# Patient Record
Sex: FEMALE | Race: WHITE | NOT HISPANIC OR LATINO | Employment: OTHER | URBAN - METROPOLITAN AREA
[De-identification: names, ages, dates, MRNs, and addresses within clinical notes are randomized per-mention and may not be internally consistent; named-entity substitution may affect disease eponyms.]

---

## 2020-09-24 ENCOUNTER — TELEPHONE (OUTPATIENT)
Dept: TRANSPLANT | Facility: CLINIC | Age: 60
End: 2020-09-24

## 2020-09-24 NOTE — LETTER
September 24, 2020    Denis Rai  [address] [facility] [place] [address]  Phone: [phone]  Fax: [phone]             Dear Denis Rai:    Patient: Antonella Chase   MR Number: [id number]   YOB: 1960     Thank you for the referral of Antonella Chase to our lung transplant program. Your patients demographic and   clinical information have been submitted for transplant provider review and financial clearance. Once the provider and insurance company has approved the consult for your patient, she will be contacted by our office re: the date for an appointment. We will update you once this initial appointment has been scheduled. You will receive an after-visit summary following the completion of your patients appointment in our clinic.    Thank you again for your trust in our program. If there is anything we can do for you or your staff, please feel free to contact us at [phone].    Sincerely,         Amari Hill MD   Director, Lung Transplantation   Pulmonary & Critical Care Medicine    [facility]  [address] [place] [id number]  [phone]

## 2020-09-24 NOTE — TELEPHONE ENCOUNTER
Received referral from Dr. Rai, routed to provider for review, authorization to proceed with financial clearance/ lung transplant consult scheduling.

## 2020-09-24 NOTE — TELEPHONE ENCOUNTER
----- Message from Zora Garber sent at 9/24/2020  4:54 PM CDT -----  Regarding: Lung transpont appt  Dr FRANCO Sow it refing this pt for lung transplant.     Can you plz ctc the to schedule and appt. ?    The refrl and records are in media.     I'll check chart for updates and epic for appt.     Thank you,  Zora Garber   [facility]    [id number]

## 2020-09-25 ENCOUNTER — TELEPHONE (OUTPATIENT)
Dept: TRANSPLANT | Facility: CLINIC | Age: 60
End: 2020-09-25

## 2020-09-25 DIAGNOSIS — J84.112 IPF (IDIOPATHIC PULMONARY FIBROSIS): ICD-10-CM

## 2020-09-25 DIAGNOSIS — Z01.812 PRE-PROCEDURE LAB EXAM: Primary | ICD-10-CM

## 2020-09-25 DIAGNOSIS — J44.9 CHRONIC OBSTRUCTIVE PULMONARY DISEASE, UNSPECIFIED COPD TYPE: ICD-10-CM

## 2020-09-25 NOTE — LETTER
October 2, 2020    Denis Rai  [address] [facility] [place] [address]  Phone: [phone]  Fax: [phone]             Dear Denis Rai:    Patient: Antonella Chase   MR Number: [id number]   YOB: 1960     Thank you for the referral of Antonella Chase to our lung transplant program. An initial appointment with the transplant team has been scheduled for October 20, 2020. You will receive an after-visit summary following the completion of your patients appointment in our clinic.    Thank you again for your trust in our program. If there is anything we can do for you or your staff, please feel free to contact us at [phone].    Sincerely,         Amari Hill MD   Director, Lung Transplantation   Pulmonary & Critical Care Medicine    [facility]  [address] [place] [address]  [phone]

## 2020-09-25 NOTE — TELEPHONE ENCOUNTER
----- Message from Syeda Arango DO sent at 9/25/2020  2:50 PM CDT -----  Regarding: RE: New Referral  Yes please  ----- Message -----  From: Jelena Hernandez  Sent: 9/25/2020   1:38 PM CDT  To: Syeda Arango DO  Subject: New Referral                                     Referral from Dr. Denis Rai    Dx: IPF/COPD  Patient is 60 year old female.  BMI 23.17    PFT's 5/4/20  FVC 2.50  FEV1 1.32  TLC 5.41  DLCO 9.87    Consult?

## 2020-10-02 ENCOUNTER — TELEPHONE (OUTPATIENT)
Dept: TRANSPLANT | Facility: CLINIC | Age: 60
End: 2020-10-02

## 2020-10-02 NOTE — TELEPHONE ENCOUNTER
Left voicemail for patient about upcoming appointments on 10-20-20@8am,COVID test [place], on 10/17/20@10am.

## 2020-10-02 NOTE — TELEPHONE ENCOUNTER
Attempted to reach patient and her daughter regarding lung transplant referral. No answer, left voicemail requesting return call.

## 2020-10-02 NOTE — TELEPHONE ENCOUNTER
Patient returned call about lung transplant referral, consult scheduling. Patient is interested and requests next available date, in the morning. Offered October 20. Patient requested this date. Patient is aware we will confirm appointment date and time, she will need to obtain PFTs. Patient is aware that she will need to bring CD of CT scan for her consult visit. She reports that she is having testing today: 6MWT, echo, CT locally. She is aware she will need to have COVID testing prior to PFTs at [facility]. She consents to testing at [facility] three days prior to appointment. Patient verbalized understanding, she is aware will need to wear a mask at all times, bring oxygen for travel, and states her daughter will accompany her. All questions answered at this time. Request to .

## 2020-10-06 ENCOUNTER — TELEPHONE (OUTPATIENT)
Dept: TRANSPLANT | Facility: CLINIC | Age: 60
End: 2020-10-06

## 2020-10-06 NOTE — TELEPHONE ENCOUNTER
Patient called to follow up regarding upcoming consult for appointment times and patient my chart access. Resent email. Patient is aware instructions will be mailed as well. All questions answered at this time.

## 2020-10-06 NOTE — TELEPHONE ENCOUNTER
----- Message from Shelby Moraes sent at 10/6/2020 12:45 PM CDT -----  Contact: pt  Patient calling regarding 10/17/2020 appt  Speak with nurse       Call Back : [phone]

## 2020-10-16 ENCOUNTER — TELEPHONE (OUTPATIENT)
Dept: TRANSPLANT | Facility: CLINIC | Age: 60
End: 2020-10-16

## 2020-10-16 NOTE — TELEPHONE ENCOUNTER
Left voicemail for patient about upcoming appointments on 10/20/20.  And COVID test [place] on 10/17/20.

## 2020-10-17 ENCOUNTER — LAB VISIT (OUTPATIENT)
Dept: FAMILY MEDICINE | Facility: CLINIC | Age: 60
End: 2020-10-17
Payer: COMMERCIAL

## 2020-10-17 DIAGNOSIS — Z01.812 PRE-PROCEDURE LAB EXAM: ICD-10-CM

## 2020-10-17 PROCEDURE — U0003 INFECTIOUS AGENT DETECTION BY NUCLEIC ACID (DNA OR RNA); SEVERE ACUTE RESPIRATORY SYNDROME CORONAVIRUS 2 (SARS-COV-2) (CORONAVIRUS DISEASE [COVID-19]), AMPLIFIED PROBE TECHNIQUE, MAKING USE OF HIGH THROUGHPUT TECHNOLOGIES AS DESCRIBED BY CMS-2020-01-R: HCPCS | Mod: TXP

## 2020-10-18 LAB — SARS-COV-2 RNA RESP QL NAA+PROBE: NOT DETECTED

## 2020-10-20 ENCOUNTER — HOSPITAL ENCOUNTER (OUTPATIENT)
Dept: PULMONOLOGY | Facility: CLINIC | Age: 60
Discharge: HOME | End: 2020-10-20
Payer: COMMERCIAL

## 2020-10-20 ENCOUNTER — INITIAL CONSULT (OUTPATIENT)
Dept: TRANSPLANT | Facility: CLINIC | Age: 60
End: 2020-10-20
Payer: COMMERCIAL

## 2020-10-20 VITALS
DIASTOLIC BLOOD PRESSURE: 79 MMHG | TEMPERATURE: 97 F | SYSTOLIC BLOOD PRESSURE: 187 MMHG | BODY MASS INDEX: 26.22 KG/M2 | HEIGHT: 63 IN | RESPIRATION RATE: 20 BRPM | HEART RATE: 70 BPM | OXYGEN SATURATION: 95 % | WEIGHT: 148 LBS

## 2020-10-20 DIAGNOSIS — Z76.82 LUNG TRANSPLANT CANDIDATE: ICD-10-CM

## 2020-10-20 DIAGNOSIS — C44.90 SKIN CANCER: ICD-10-CM

## 2020-10-20 DIAGNOSIS — J84.112 IPF (IDIOPATHIC PULMONARY FIBROSIS): ICD-10-CM

## 2020-10-20 DIAGNOSIS — J44.9 STAGE 3 SEVERE COPD BY GOLD CLASSIFICATION: ICD-10-CM

## 2020-10-20 DIAGNOSIS — J44.9 CHRONIC OBSTRUCTIVE PULMONARY DISEASE, UNSPECIFIED COPD TYPE: ICD-10-CM

## 2020-10-20 DIAGNOSIS — I50.20 HFREF (HEART FAILURE WITH REDUCED EJECTION FRACTION): Primary | ICD-10-CM

## 2020-10-20 LAB
AMPHET+METHAMPHET UR QL: NEGATIVE
BARBITURATES UR QL SCN>200 NG/ML: NEGATIVE
BENZODIAZ UR QL SCN>200 NG/ML: NEGATIVE
BZE UR QL SCN: NEGATIVE
CANNABINOIDS UR QL SCN: NEGATIVE
CREAT UR-MCNC: 29 MG/DL (ref 15–325)
ETHANOL UR-MCNC: <10 MG/DL
METHADONE UR QL SCN>300 NG/ML: NEGATIVE
OPIATES UR QL SCN: NEGATIVE
PCP UR QL SCN>25 NG/ML: NEGATIVE
TOXICOLOGY INFORMATION: NORMAL

## 2020-10-20 PROCEDURE — 99204 PR OFFICE/OUTPT VISIT, NEW, LEVL IV, 45-59 MIN: ICD-10-PCS | Mod: 25,S$GLB,TXP, | Performed by: INTERNAL MEDICINE

## 2020-10-20 PROCEDURE — 94729 DIFFUSING CAPACITY: CPT | Mod: S$GLB,TXP,, | Performed by: INTERNAL MEDICINE

## 2020-10-20 PROCEDURE — 94729 PR C02/MEMBANE DIFFUSE CAPACITY: ICD-10-PCS | Mod: S$GLB,TXP,, | Performed by: INTERNAL MEDICINE

## 2020-10-20 PROCEDURE — 3008F PR BODY MASS INDEX (BMI) DOCUMENTED: ICD-10-PCS | Mod: CPTII,S$GLB,TXP, | Performed by: INTERNAL MEDICINE

## 2020-10-20 PROCEDURE — 99999 PR PBB SHADOW E&M-EST. PATIENT-LVL III: CPT | Mod: PBBFAC,TXP,, | Performed by: INTERNAL MEDICINE

## 2020-10-20 PROCEDURE — 94010 BREATHING CAPACITY TEST: ICD-10-PCS | Mod: S$GLB,TXP,, | Performed by: INTERNAL MEDICINE

## 2020-10-20 PROCEDURE — 3008F BODY MASS INDEX DOCD: CPT | Mod: CPTII,S$GLB,TXP, | Performed by: INTERNAL MEDICINE

## 2020-10-20 PROCEDURE — 94727 PR PULM FUNCTION TEST BY GAS: ICD-10-PCS | Mod: S$GLB,TXP,, | Performed by: INTERNAL MEDICINE

## 2020-10-20 PROCEDURE — 80307 DRUG TEST PRSMV CHEM ANLYZR: CPT | Mod: NTX

## 2020-10-20 PROCEDURE — 80323 ALKALOIDS NOS: CPT | Mod: NTX

## 2020-10-20 PROCEDURE — 99999 PR PBB SHADOW E&M-EST. PATIENT-LVL III: ICD-10-PCS | Mod: PBBFAC,TXP,, | Performed by: INTERNAL MEDICINE

## 2020-10-20 PROCEDURE — 94010 BREATHING CAPACITY TEST: CPT | Mod: S$GLB,TXP,, | Performed by: INTERNAL MEDICINE

## 2020-10-20 PROCEDURE — 94727 GAS DIL/WSHOT DETER LNG VOL: CPT | Mod: S$GLB,TXP,, | Performed by: INTERNAL MEDICINE

## 2020-10-20 PROCEDURE — 99204 OFFICE O/P NEW MOD 45 MIN: CPT | Mod: 25,S$GLB,TXP, | Performed by: INTERNAL MEDICINE

## 2020-10-20 RX ORDER — ALBUTEROL SULFATE 1.25 MG/3ML
3 SOLUTION RESPIRATORY (INHALATION) EVERY 6 HOURS PRN
COMMUNITY
Start: 2020-07-22

## 2020-10-20 RX ORDER — LEVOCETIRIZINE DIHYDROCHLORIDE 5 MG/1
5 TABLET, FILM COATED ORAL NIGHTLY PRN
COMMUNITY
Start: 2020-07-14 | End: 2023-01-03

## 2020-10-20 RX ORDER — PSEUDOEPHEDRINE HCL 30 MG/1
1-2 TABLET, FILM COATED ORAL DAILY PRN
COMMUNITY
Start: 2020-09-01 | End: 2023-01-03

## 2020-10-20 RX ORDER — LINACLOTIDE 72 UG/1
72 CAPSULE, GELATIN COATED ORAL DAILY PRN
COMMUNITY
Start: 2020-09-09

## 2020-10-20 RX ORDER — ALBUTEROL SULFATE 90 UG/1
2 AEROSOL, METERED RESPIRATORY (INHALATION) EVERY 4 HOURS PRN
COMMUNITY
Start: 2020-10-16

## 2020-10-20 RX ORDER — FLUTICASONE FUROATE, UMECLIDINIUM BROMIDE AND VILANTEROL TRIFENATATE 100; 62.5; 25 UG/1; UG/1; UG/1
1 POWDER RESPIRATORY (INHALATION) DAILY
COMMUNITY
Start: 2020-09-30 | End: 2021-11-18

## 2020-10-20 RX ORDER — ESOMEPRAZOLE MAGNESIUM 40 MG/1
1 CAPSULE, DELAYED RELEASE ORAL DAILY
COMMUNITY
Start: 2020-10-06 | End: 2023-01-03

## 2020-10-20 RX ORDER — IBUPROFEN 600 MG/1
600 TABLET ORAL 4 TIMES DAILY PRN
COMMUNITY
Start: 2020-09-25 | End: 2023-01-03

## 2020-10-20 RX ORDER — HYDROXYZINE HYDROCHLORIDE 25 MG/1
25 TABLET, FILM COATED ORAL NIGHTLY PRN
COMMUNITY
Start: 2020-07-28

## 2020-10-20 NOTE — LETTER
October 20, 2020        Denis Rai  [address] [facility] [place] [address]  Phone: [phone]  Fax: [phone]             Sunil Kam - Transplant [facility]  [address]  [facility] [address]  Phone: [phone]   Patient: Antonella Chase   MR Number: [id number]   YOB: 1960   Date of Visit: 10/20/2020       Dear Dr. Denis Rai    Thank you for referring Antonella Chase to me for evaluation. Attached you will find relevant portions of my assessment and plan of care.    If you have questions, please do not hesitate to call me. I look forward to following Antonella Chase along with you.    Sincerely,    Digna Nguyen MD    Enclosure    If you would like to receive this communication electronically, please contact [email or web address] or [phone] to request [employer] Link access.    [employer] is a tool which provides read-only access to select patient information with whom you have a relationship. Its easy to use and provides real time access to review your patients record including encounter summaries, notes, results, and demographic information.    If you feel you have received this communication in error or would no longer like to receive these types of communications, please e-mail [email or web address]

## 2020-10-20 NOTE — PROGRESS NOTES
LUNG TRANSPLANT INITIAL EVALUATION                                                                                                                                           Reason for Visit:  Evaluation for lung transplant    Referring Physician: Digna Nguyen MD    History of Present Illness: Antonella Chase is a 60 y.o. female who is on 0L of oxygen.  She is on no assisted ventilation.  Her New York Heart Association Class is III and a Karnofsky score of 70% - Cares for self: Unable to carry on normal activity or active work. She is not diabetic.     Patient with hx/o combined pulmonary fibrosis and emphysema, previous pneumothoraces due to bullous disease, tobacco use, cutaneous malignancy, GERD/PUD, presenting for initial visit to pre-LT clinic for evaluation of possible lung transplantation accompanied by daughter, Dennis Glasgow.     She was diagnosed with COPD due to emphysema about 10 years ago and, currently, on Trelegy and prn albuterol with good control of her symptoms.  She was diagnosed with ILD in 07/2020 based on radiographs.  She is not on any therapy yet due to concerns with her underlying GI disease.  She reports that her dyspnea got progressively worse in the last 7 months.  A year ago, she reports being fairly more active.  She denies any hospitalization related to respiratory illness in more than 5 years.  She, however, has had multiple at home treatment for acute exacerbations with antibiotics and steroids.  Last acute exacerbation about a week ago.  The only intubations, were grover-op for her lung surgeries.       Other pertinent hx include: hx/o bilateral pneumothoraces, GI disease, cutaneous malignancies, and substance abuse.        Pneumothoraces: She reports a hx/o left pneumothorax at age 31 x 3 then followed by right pneumothorax s/p likely bilateral bullectomy     GI disease: PUD diagnosed per EGD, on Nexium.  She has history of diverticulosis with multiple bouts of diverticulitis s/p  resection.        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  She has been sober for > 6years.  Previously, addicted to BZD and cocaine.   She has had 2 surgeries since she was sober (tummy tuck and hysterectomy) and she did not have a relapse    She reports 6 pregnancies, previous hx/o blood transfusions.   She denies any other cardiac/thoracic surgeries, chest trauma, hx/o clotting or bleeding disorders, hx/o arthritis, connective tissue disease, raynaud's phenomenon.        Age/gender appropriate screening/immunizations:  -Up-to-date with colonoscopy, mammogram  -Gyn- not indicated, complete hysterectomy 06/2020 for uterine fibroid(benign).    -Up-to-date with age/disease appropriate vaccinations, including pneumonia, influenza.      No prior/ongoing evaluation for LT at other centers    Patient is a resident of [place] but is motivated to relocate to [place] for the required time-frame during the lung transplantation and post-lung transplant period.    Past Medical History:   Diagnosis Date    Anal fissure     Ureña's esophagus     COPD (chronic obstructive pulmonary disease)     Diverticulitis     Hiatal hernia     IPF (idiopathic pulmonary fibrosis)        Past Surgical History:   Procedure Laterality Date    COLECTOMY  2012    COLONOSCOPY  10/2019    most recent, has every three years    DILATION AND CURETTAGE OF UTERUS      x3 (miscarriages)    EXCISION OF MELANOMA  2013    on back    HYSTERECTOMY  06/01/2020    SQUAMOUS CELL CARCINOMA EXCISION  2020    lip    THORACOTOMY Left 1991    THORACOTOMY Right 1993    spontaneous pneumo       Allergies: Codeine and Morphine    Current Outpatient Medications   Medication Sig    albuterol (ACCUNEB) 1.25 mg/3 mL Nebu Take 3 mLs by  nebulization every 6 (six) hours as needed.    albuterol (PROVENTIL/VENTOLIN HFA) 90 mcg/actuation inhaler Inhale 2 puffs into the lungs every 4 (four) hours as needed.    esomeprazole (NEXIUM) 40 MG capsule Take 1 capsule by mouth once daily.    hydrOXYzine HCL (ATARAX) 25 MG tablet Take 25 mg by mouth nightly as needed. For hives    ibuprofen (ADVIL,MOTRIN) 600 MG tablet Take 600 mg by mouth 4 (four) times daily as needed.    levocetirizine (XYZAL) 5 MG tablet Take 5 mg by mouth nightly as needed for Allergies. For hives    LINZESS 72 mcg Cap capsule Take 72 mcg by mouth daily as needed.    SUDOGEST 30 mg tablet Take 1-2 tablets by mouth daily as needed.    TRELEGY ELLIPTA 100-62.5-25 mcg DsDv Inhale 1 puff into the lungs once daily.     No current facility-administered medications for this visit.          There is no immunization history on file for this patient.  Family History:    Family History   Problem Relation Age of Onset    COPD Mother          at 72    Heart disease Mother     Other Mother         addiction history    Alcohol abuse Father         65    Kidney disease Father         ESRD    Breast cancer Sister         2016    No Known Problems Brother     Alzheimer's disease Brother         diagnosed in the last year    No Known Problems Brother     Bipolar disorder Sister     Coronary artery disease Sister         stented 2 months ago    Wagg-Ubstu-Wcgvlay disease Sister 6        hip replacement x 2, 'every ten years hip replacement'    No Known Problems Daughter     Other Daughter         spontaneous pneumo x 3 at 13    Hashimoto's thyroiditis Daughter     Other Daughter         alcohol abuse, 9 months sober 2020    Hypertension Daughter     Pneumonia Daughter         2017 influenza; intubated 14 days     Social History     Substance and Sexual Activity   Alcohol Use Not Currently    Frequency: Monthly or less    Binge frequency: Less than monthly    Comment:  "history of, last 2016, would drink about a bottle of wine a day      Social History     Substance and Sexual Activity   Drug Use Not Currently    Types: Benzodiazepines, Cocaine, "Crack" cocaine    Comment: valium 'her mother used to give her it for cramps and travel'; detox 2016; rehab and currently a sponsor, active in AA      Social History     Socioeconomic History    Marital status: Single     Spouse name: Not on file    Number of children: Not on file    Years of education: Not on file    Highest education level: Not on file   Occupational History    Not on file   Social Needs    Financial resource strain: Not very hard    Food insecurity     Worry: Never true     Inability: Never true    Transportation needs     Medical: No     Non-medical: No   Tobacco Use    Smoking status: Former Smoker     Types: Cigarettes     Start date: 1976     Quit date: 10/5/2020     Years since quittin.0    Smokeless tobacco: Never Used    Tobacco comment: still wears a patch, last used  21g patch   Substance and Sexual Activity    Alcohol use: Not Currently     Frequency: Monthly or less     Binge frequency: Less than monthly     Comment: history of, last , would drink about a bottle of wine a day    Drug use: Not Currently     Types: Benzodiazepines, Cocaine, "Crack" cocaine     Comment: valium 'her mother used to give her it for cramps and travel'; detox 2016; rehab and currently a sponsor, active in AA    Sexual activity: Not Currently     Partners: Male   Lifestyle    Physical activity     Days per week: 0 days     Minutes per session: 0 min    Stress: Only a little   Relationships    Social connections     Talks on phone: More than three times a week     Gets together: More than three times a week     Attends [employer]ism service: Not on file     Active member of club or organization: Yes     Attends meetings of clubs or organizations: 1 to 4 times per year     Relationship status:  " "  Other Topics Concern    Not on file   Social History Narrative    Not on file     Review of Systems   Constitutional: Negative for chills, fever, malaise/fatigue and weight loss.   HENT: Negative for congestion, ear pain, hearing loss and sore throat.    Eyes: Negative for blurred vision and double vision.   Respiratory: Positive for shortness of breath. Negative for cough, hemoptysis, sputum production and wheezing.    Cardiovascular: Negative for chest pain, palpitations and leg swelling.   Gastrointestinal: Negative for abdominal pain, constipation, diarrhea, nausea and vomiting.   Genitourinary: Negative for dysuria, frequency, hematuria and urgency.   Musculoskeletal: Negative for back pain, joint pain and myalgias.   Skin: Negative for rash.   Neurological: Negative for dizziness, weakness and headaches.   Psychiatric/Behavioral: Negative for depression and memory loss. The patient is not nervous/anxious and does not have insomnia.      Vitals  BP (!) 187/79   Pulse 70   Temp 97.1 °F (36.2 °C) (Oral)   Resp 20   Ht 5' 3" (1.6 m)   Wt 67.1 kg (148 lb)   SpO2 95% Comment: room air  BMI 26.22 kg/m²   Physical Exam  Constitutional:       Appearance: She is well-developed.   HENT:      Head: Normocephalic and atraumatic.   Eyes:      Conjunctiva/sclera: Conjunctivae normal.      Pupils: Pupils are equal, round, and reactive to light.   Neck:      Musculoskeletal: Normal range of motion and neck supple.   Cardiovascular:      Rate and Rhythm: Normal rate and regular rhythm.      Heart sounds: Normal heart sounds.   Pulmonary:      Effort: Pulmonary effort is normal.      Breath sounds: Normal breath sounds.   Abdominal:      General: Bowel sounds are normal.      Palpations: Abdomen is soft.   Musculoskeletal: Normal range of motion.   Skin:     General: Skin is warm and dry.   Neurological:      Mental Status: She is alert and oriented to person, place, and time.       Labs:  Lab Visit on 10/17/2020 "   Component Date Value    SARS-CoV2 (COVID-19) Ruben* 10/17/2020 Not Detected        Pulmonary Function Tests 10/20/2020 5/4/2020   FVC 2.31 2.5   FEV1 1.17 1.32   TLC (liters) 4.36 5.41   DLCO (ml/mmHg sec) 11.7 9.8   FVC% 74 76   FEV1% 47 51   TLC% 91 106   RV 2.03 2.85   RV% 111 147   DLCO% 52 40     No flowsheet data found.     6MWT OSH  10/02/2020  Starting SPO2 98%, HR 73, on RA  Lowest SPO2 93%, HR 85 at completion, on RA  Distance: 305 meters    Imaging:  CT chest OSH, personally reviewed:  Peripheral reticular opacities, bullous emphysema, honeycombing are noted and may represent a UIP pattern and idiopathic pulmonary fibrosis.      Cardiodiagnostics:  TTE OSH  10/02/20  -LVEF 40-45%, grade I diastolic dysfunction    Assessment:  1. HFrEF (heart failure with reduced ejection fraction)    2. Lung transplant candidate    3. Stage 3 severe COPD by GOLD classification    4. IPF (idiopathic pulmonary fibrosis)    5. Skin cancer      Plan:   - Given combined disease, the spirometric values are not completely reliable in prognosticating the patient.  Clinically the patient appears to be early for lung transplant consideration.    - Will defer starting anti-fibrotic agents to referring physician       - Patient has a depressed ejection fraction on transthoracic echo.  This may pose as barrier towards her candidacy for lung transplant and deems further investigation.  Requesting referring physician to seek cardiology input  for possibility of left heart catherization and if possible intervention can improve her systolic function.    - Dermatology clearance for history of cutaneous malignancies.      Return to clinic in 3 months     Thank you for allowing us to participate in this patient's care    Digna Nguyen MD  Pulmonary/Critical Care Medicine/Transplant Pulmonology  [facility]  10/20/2020

## 2020-10-23 LAB
ANABASINE UR-MCNC: <2 NG/ML
COTININE UR-MCNC: 95 NG/ML
NICOTINE UR-MCNC: 11 NG/ML
NORNICOTINE UR-MCNC: 2 NG/ML

## 2020-12-09 ENCOUNTER — TELEPHONE (OUTPATIENT)
Dept: TRANSPLANT | Facility: CLINIC | Age: 60
End: 2020-12-09

## 2020-12-09 DIAGNOSIS — J84.112 IPF (IDIOPATHIC PULMONARY FIBROSIS): ICD-10-CM

## 2020-12-09 DIAGNOSIS — J44.9 STAGE 3 SEVERE COPD BY GOLD CLASSIFICATION: ICD-10-CM

## 2020-12-09 DIAGNOSIS — Z01.812 PRE-PROCEDURE LAB EXAM: ICD-10-CM

## 2020-12-09 DIAGNOSIS — Z76.82 LUNG TRANSPLANT CANDIDATE: Primary | ICD-10-CM

## 2020-12-09 NOTE — TELEPHONE ENCOUNTER
----- Message from Rolando Markham sent at 12/9/2020 11:33 AM CST -----  Regarding: Pt Info/Scheduling        Pt calling to speak with coord to inform that all heart stuff is taken care of, and she needs to schedule an appt sometime in January                       [phone] (M)    
Returned patient call, she reports she had a heart cath yesterday, had 95% blockage of Right Coronary artery, 1 stent placed, and is on plavix, protonix, crestor now (no longer taking nexium). Patient reports she will attend cardiac rehab starting on December 23 for four weeks. She states she will schedule to see local derm in January for 2 year follow up after SCC lip. She requests an appointment with Dr. Nguyen on 1/26 with covid testing on 1/23. All questions answered at this time. Request to .  
Abnormal Lactate: > 2

## 2020-12-11 ENCOUNTER — TELEPHONE (OUTPATIENT)
Dept: TRANSPLANT | Facility: CLINIC | Age: 60
End: 2020-12-11

## 2021-01-07 ENCOUNTER — TELEPHONE (OUTPATIENT)
Dept: TRANSPLANT | Facility: CLINIC | Age: 61
End: 2021-01-07

## 2021-01-08 ENCOUNTER — PATIENT MESSAGE (OUTPATIENT)
Dept: TRANSPLANT | Facility: CLINIC | Age: 61
End: 2021-01-08

## 2021-01-20 ENCOUNTER — TELEPHONE (OUTPATIENT)
Dept: TRANSPLANT | Facility: CLINIC | Age: 61
End: 2021-01-20

## 2021-01-21 ENCOUNTER — PATIENT MESSAGE (OUTPATIENT)
Dept: TRANSPLANT | Facility: CLINIC | Age: 61
End: 2021-01-21

## 2021-01-25 ENCOUNTER — TELEPHONE (OUTPATIENT)
Dept: TRANSPLANT | Facility: CLINIC | Age: 61
End: 2021-01-25

## 2021-02-01 ENCOUNTER — LAB VISIT (OUTPATIENT)
Dept: FAMILY MEDICINE | Facility: CLINIC | Age: 61
End: 2021-02-01
Payer: MEDICAID

## 2021-02-01 DIAGNOSIS — Z01.812 PRE-PROCEDURE LAB EXAM: ICD-10-CM

## 2021-02-01 PROCEDURE — U0003 INFECTIOUS AGENT DETECTION BY NUCLEIC ACID (DNA OR RNA); SEVERE ACUTE RESPIRATORY SYNDROME CORONAVIRUS 2 (SARS-COV-2) (CORONAVIRUS DISEASE [COVID-19]), AMPLIFIED PROBE TECHNIQUE, MAKING USE OF HIGH THROUGHPUT TECHNOLOGIES AS DESCRIBED BY CMS-2020-01-R: HCPCS | Mod: TXP

## 2021-02-02 LAB — SARS-COV-2 RNA RESP QL NAA+PROBE: NOT DETECTED

## 2021-02-04 ENCOUNTER — HOSPITAL ENCOUNTER (OUTPATIENT)
Dept: PULMONOLOGY | Facility: CLINIC | Age: 61
Discharge: HOME | End: 2021-02-04
Payer: MEDICAID

## 2021-02-04 ENCOUNTER — OFFICE VISIT (OUTPATIENT)
Dept: TRANSPLANT | Facility: CLINIC | Age: 61
End: 2021-02-04
Payer: MEDICAID

## 2021-02-04 VITALS
WEIGHT: 152 LBS | DIASTOLIC BLOOD PRESSURE: 64 MMHG | RESPIRATION RATE: 20 BRPM | SYSTOLIC BLOOD PRESSURE: 129 MMHG | BODY MASS INDEX: 25.95 KG/M2 | HEART RATE: 81 BPM | TEMPERATURE: 97 F | OXYGEN SATURATION: 94 % | HEIGHT: 64 IN

## 2021-02-04 VITALS — WEIGHT: 151.88 LBS | BODY MASS INDEX: 26.91 KG/M2 | HEIGHT: 63 IN

## 2021-02-04 DIAGNOSIS — J84.112 IPF (IDIOPATHIC PULMONARY FIBROSIS): ICD-10-CM

## 2021-02-04 DIAGNOSIS — J44.9 STAGE 3 SEVERE COPD BY GOLD CLASSIFICATION: ICD-10-CM

## 2021-02-04 DIAGNOSIS — Z76.82 LUNG TRANSPLANT CANDIDATE: Primary | ICD-10-CM

## 2021-02-04 DIAGNOSIS — Z76.82 LUNG TRANSPLANT CANDIDATE: ICD-10-CM

## 2021-02-04 DIAGNOSIS — J44.9 CHRONIC OBSTRUCTIVE PULMONARY DISEASE, UNSPECIFIED COPD TYPE: ICD-10-CM

## 2021-02-04 DIAGNOSIS — I25.10 CORONARY ARTERY DISEASE INVOLVING NATIVE HEART, ANGINA PRESENCE UNSPECIFIED, UNSPECIFIED VESSEL OR LESION TYPE: ICD-10-CM

## 2021-02-04 DIAGNOSIS — C44.90 SKIN CANCER: ICD-10-CM

## 2021-02-04 DIAGNOSIS — F17.201 TOBACCO ABUSE, IN REMISSION: ICD-10-CM

## 2021-02-04 DIAGNOSIS — I50.20 HFREF (HEART FAILURE WITH REDUCED EJECTION FRACTION): ICD-10-CM

## 2021-02-04 PROCEDURE — 94618 PULMONARY STRESS TESTING: CPT | Mod: 26,S$PBB,TXP, | Performed by: INTERNAL MEDICINE

## 2021-02-04 PROCEDURE — 99214 OFFICE O/P EST MOD 30 MIN: CPT | Mod: 25,S$PBB,NTX, | Performed by: INTERNAL MEDICINE

## 2021-02-04 PROCEDURE — 99214 OFFICE O/P EST MOD 30 MIN: CPT | Mod: PBBFAC,TXP,25 | Performed by: INTERNAL MEDICINE

## 2021-02-04 PROCEDURE — 99999 PR PBB SHADOW E&M-EST. PATIENT-LVL IV: ICD-10-PCS | Mod: PBBFAC,TXP,, | Performed by: INTERNAL MEDICINE

## 2021-02-04 PROCEDURE — 94727 PR PULM FUNCTION TEST BY GAS: ICD-10-PCS | Mod: 26,S$PBB,NTX, | Performed by: INTERNAL MEDICINE

## 2021-02-04 PROCEDURE — 94729 DIFFUSING CAPACITY: CPT | Mod: 26,S$PBB,NTX, | Performed by: INTERNAL MEDICINE

## 2021-02-04 PROCEDURE — 94010 BREATHING CAPACITY TEST: CPT | Mod: 26,59,S$PBB,NTX | Performed by: INTERNAL MEDICINE

## 2021-02-04 PROCEDURE — 94618 PULMONARY STRESS TESTING: ICD-10-PCS | Mod: 26,S$PBB,TXP, | Performed by: INTERNAL MEDICINE

## 2021-02-04 PROCEDURE — 94729 PR C02/MEMBANE DIFFUSE CAPACITY: ICD-10-PCS | Mod: 26,S$PBB,NTX, | Performed by: INTERNAL MEDICINE

## 2021-02-04 PROCEDURE — 99999 PR PBB SHADOW E&M-EST. PATIENT-LVL IV: CPT | Mod: PBBFAC,TXP,, | Performed by: INTERNAL MEDICINE

## 2021-02-04 PROCEDURE — 94727 GAS DIL/WSHOT DETER LNG VOL: CPT | Mod: PBBFAC,NTX | Performed by: INTERNAL MEDICINE

## 2021-02-04 PROCEDURE — 94618 PULMONARY STRESS TESTING: CPT | Mod: PBBFAC,NTX | Performed by: INTERNAL MEDICINE

## 2021-02-04 PROCEDURE — 94010 BREATHING CAPACITY TEST: CPT | Mod: PBBFAC,NTX | Performed by: INTERNAL MEDICINE

## 2021-02-04 PROCEDURE — 94729 DIFFUSING CAPACITY: CPT | Mod: PBBFAC,NTX | Performed by: INTERNAL MEDICINE

## 2021-02-04 PROCEDURE — 94727 GAS DIL/WSHOT DETER LNG VOL: CPT | Mod: 26,S$PBB,NTX, | Performed by: INTERNAL MEDICINE

## 2021-02-04 PROCEDURE — 94010 BREATHING CAPACITY TEST: ICD-10-PCS | Mod: 26,59,S$PBB,NTX | Performed by: INTERNAL MEDICINE

## 2021-02-04 PROCEDURE — 99214 PR OFFICE/OUTPT VISIT, EST, LEVL IV, 30-39 MIN: ICD-10-PCS | Mod: 25,S$PBB,NTX, | Performed by: INTERNAL MEDICINE

## 2021-02-04 RX ORDER — CLOPIDOGREL BISULFATE 75 MG/1
75 TABLET ORAL DAILY
COMMUNITY
Start: 2020-12-09 | End: 2021-11-18

## 2021-02-04 RX ORDER — PANTOPRAZOLE SODIUM 40 MG/1
40 TABLET, DELAYED RELEASE ORAL 2 TIMES DAILY
COMMUNITY
Start: 2021-01-11

## 2021-02-04 RX ORDER — SUCRALFATE 1 G/1
1 TABLET ORAL 4 TIMES DAILY
COMMUNITY
Start: 2020-11-11

## 2021-06-08 ENCOUNTER — TELEPHONE (OUTPATIENT)
Dept: TRANSPLANT | Facility: CLINIC | Age: 61
End: 2021-06-08

## 2021-06-08 DIAGNOSIS — J84.112 IPF (IDIOPATHIC PULMONARY FIBROSIS): Primary | ICD-10-CM

## 2021-06-08 DIAGNOSIS — J44.9 CHRONIC OBSTRUCTIVE PULMONARY DISEASE, UNSPECIFIED COPD TYPE: ICD-10-CM

## 2021-08-03 DIAGNOSIS — Z11.52 ENCOUNTER FOR PREPROCEDURE SCREENING LABORATORY TESTING FOR COVID-19: Primary | ICD-10-CM

## 2021-08-03 DIAGNOSIS — Z01.812 ENCOUNTER FOR PREPROCEDURE SCREENING LABORATORY TESTING FOR COVID-19: Primary | ICD-10-CM

## 2021-08-05 ENCOUNTER — PATIENT MESSAGE (OUTPATIENT)
Dept: TRANSPLANT | Facility: CLINIC | Age: 61
End: 2021-08-05

## 2021-08-16 ENCOUNTER — TELEPHONE (OUTPATIENT)
Dept: TRANSPLANT | Facility: CLINIC | Age: 61
End: 2021-08-16

## 2021-09-20 ENCOUNTER — TELEPHONE (OUTPATIENT)
Dept: TRANSPLANT | Facility: CLINIC | Age: 61
End: 2021-09-20

## 2021-10-06 ENCOUNTER — TELEPHONE (OUTPATIENT)
Dept: TRANSPLANT | Facility: CLINIC | Age: 61
End: 2021-10-06

## 2021-11-16 DIAGNOSIS — J44.9 CHRONIC OBSTRUCTIVE PULMONARY DISEASE, UNSPECIFIED COPD TYPE: ICD-10-CM

## 2021-11-16 DIAGNOSIS — J84.112 IPF (IDIOPATHIC PULMONARY FIBROSIS): Primary | ICD-10-CM

## 2021-11-17 ENCOUNTER — DOCUMENTATION ONLY (OUTPATIENT)
Dept: TRANSPLANT | Facility: CLINIC | Age: 61
End: 2021-11-17
Payer: MEDICAID

## 2021-11-18 ENCOUNTER — OFFICE VISIT (OUTPATIENT)
Dept: TRANSPLANT | Facility: CLINIC | Age: 61
End: 2021-11-18
Payer: MEDICAID

## 2021-11-18 ENCOUNTER — HOSPITAL ENCOUNTER (OUTPATIENT)
Dept: PULMONOLOGY | Facility: CLINIC | Age: 61
Discharge: HOME | End: 2021-11-18
Payer: MEDICAID

## 2021-11-18 VITALS
OXYGEN SATURATION: 96 % | SYSTOLIC BLOOD PRESSURE: 136 MMHG | HEIGHT: 63 IN | RESPIRATION RATE: 20 BRPM | HEIGHT: 63 IN | TEMPERATURE: 97 F | BODY MASS INDEX: 24.22 KG/M2 | HEART RATE: 71 BPM | WEIGHT: 138 LBS | WEIGHT: 136.69 LBS | BODY MASS INDEX: 24.45 KG/M2 | DIASTOLIC BLOOD PRESSURE: 72 MMHG

## 2021-11-18 DIAGNOSIS — J84.112 IPF (IDIOPATHIC PULMONARY FIBROSIS): ICD-10-CM

## 2021-11-18 DIAGNOSIS — J44.9 CHRONIC OBSTRUCTIVE PULMONARY DISEASE, UNSPECIFIED COPD TYPE: ICD-10-CM

## 2021-11-18 DIAGNOSIS — Z76.82 LUNG TRANSPLANT CANDIDATE: ICD-10-CM

## 2021-11-18 DIAGNOSIS — R06.02 SHORTNESS OF BREATH: ICD-10-CM

## 2021-11-18 DIAGNOSIS — J44.9 CHRONIC OBSTRUCTIVE PULMONARY DISEASE, UNSPECIFIED COPD TYPE: Primary | ICD-10-CM

## 2021-11-18 PROCEDURE — 99215 PR OFFICE/OUTPT VISIT, EST, LEVL V, 40-54 MIN: ICD-10-PCS | Mod: 25,S$PBB,NTX, | Performed by: INTERNAL MEDICINE

## 2021-11-18 PROCEDURE — 99214 OFFICE O/P EST MOD 30 MIN: CPT | Mod: PBBFAC,NTX,25 | Performed by: INTERNAL MEDICINE

## 2021-11-18 PROCEDURE — 99999 PR PBB SHADOW E&M-EST. PATIENT-LVL IV: CPT | Mod: PBBFAC,TXP,, | Performed by: INTERNAL MEDICINE

## 2021-11-18 PROCEDURE — 94727 GAS DIL/WSHOT DETER LNG VOL: CPT | Mod: 26,S$PBB,NTX, | Performed by: INTERNAL MEDICINE

## 2021-11-18 PROCEDURE — 99215 OFFICE O/P EST HI 40 MIN: CPT | Mod: 25,S$PBB,NTX, | Performed by: INTERNAL MEDICINE

## 2021-11-18 PROCEDURE — 94729 PR C02/MEMBANE DIFFUSE CAPACITY: ICD-10-PCS | Mod: 26,S$PBB,NTX, | Performed by: INTERNAL MEDICINE

## 2021-11-18 PROCEDURE — 94010 BREATHING CAPACITY TEST: CPT | Mod: 26,S$PBB,NTX, | Performed by: INTERNAL MEDICINE

## 2021-11-18 PROCEDURE — 94618 PULMONARY STRESS TESTING: ICD-10-PCS | Mod: 26,S$PBB,NTX, | Performed by: INTERNAL MEDICINE

## 2021-11-18 PROCEDURE — 94729 DIFFUSING CAPACITY: CPT | Mod: PBBFAC,NTX | Performed by: INTERNAL MEDICINE

## 2021-11-18 PROCEDURE — 94618 PULMONARY STRESS TESTING: CPT | Mod: PBBFAC,NTX | Performed by: INTERNAL MEDICINE

## 2021-11-18 PROCEDURE — 99999 PR PBB SHADOW E&M-EST. PATIENT-LVL IV: ICD-10-PCS | Mod: PBBFAC,TXP,, | Performed by: INTERNAL MEDICINE

## 2021-11-18 PROCEDURE — 94729 DIFFUSING CAPACITY: CPT | Mod: 26,S$PBB,NTX, | Performed by: INTERNAL MEDICINE

## 2021-11-18 PROCEDURE — 94010 BREATHING CAPACITY TEST: CPT | Mod: PBBFAC,NTX | Performed by: INTERNAL MEDICINE

## 2021-11-18 PROCEDURE — 94727 PR PULM FUNCTION TEST BY GAS: ICD-10-PCS | Mod: 26,S$PBB,NTX, | Performed by: INTERNAL MEDICINE

## 2021-11-18 PROCEDURE — 94618 PULMONARY STRESS TESTING: CPT | Mod: 26,S$PBB,NTX, | Performed by: INTERNAL MEDICINE

## 2021-11-18 PROCEDURE — 94727 GAS DIL/WSHOT DETER LNG VOL: CPT | Mod: PBBFAC,NTX | Performed by: INTERNAL MEDICINE

## 2021-11-18 PROCEDURE — 94010 BREATHING CAPACITY TEST: ICD-10-PCS | Mod: 26,S$PBB,NTX, | Performed by: INTERNAL MEDICINE

## 2021-11-18 RX ORDER — BEMPEDOIC ACID AND EZETIMIBE 180; 10 MG/1; MG/1
1 TABLET, FILM COATED ORAL DAILY
COMMUNITY
Start: 2021-10-19

## 2021-11-18 RX ORDER — BUDESONIDE, GLYCOPYRROLATE, AND FORMOTEROL FUMARATE 160; 9; 4.8 UG/1; UG/1; UG/1
2 AEROSOL, METERED RESPIRATORY (INHALATION) 2 TIMES DAILY
COMMUNITY
Start: 2021-10-27

## 2021-11-18 RX ORDER — ASPIRIN 81 MG/1
81 TABLET ORAL DAILY
COMMUNITY

## 2021-12-03 ENCOUNTER — TELEPHONE (OUTPATIENT)
Dept: TRANSPLANT | Facility: CLINIC | Age: 61
End: 2021-12-03
Payer: MEDICAID

## 2021-12-06 ENCOUNTER — TELEPHONE (OUTPATIENT)
Dept: TRANSPLANT | Facility: CLINIC | Age: 61
End: 2021-12-06
Payer: MEDICAID

## 2021-12-09 ENCOUNTER — TELEPHONE (OUTPATIENT)
Dept: TRANSPLANT | Facility: CLINIC | Age: 61
End: 2021-12-09
Payer: MEDICAID

## 2022-05-31 ENCOUNTER — TELEPHONE (OUTPATIENT)
Dept: TRANSPLANT | Facility: CLINIC | Age: 62
End: 2022-05-31
Payer: MEDICAID

## 2022-05-31 NOTE — TELEPHONE ENCOUNTER
Returned call to patient, who asked about the status of the lung transplant program. Informed her that our program was reactivated as of April 6, 2022. The patient stated that her local pulmonologist, Dr. Rai, mentioned referring her to the [facility] lung transplant program. She shared her preference to continue her care with the [facility] team. Explained that she is due for a follow up visit in November 2022 per Dr. Nguyen's last clinic encounter note. However, if Dr. Rai recommends an earlier visit based on the results of PFTs and a 6 MWT he ordered and scheduled for the end of June 2022, then our team can certainly schedule an appointment here before November. The patient said she will call our office after completing PFTs and a 6 MWT in June should she want and/or should Dr. Rai recommend any earlier clinic appointment here. The patient verbalized her understanding of all information discussed and all questions were answered to her satisfaction.       ----- Message from Dennis Pabon sent at 5/31/2022 11:27 AM CDT -----  Regarding: follow up  Patient is calling to follow up with nurse. Looking to see when she would be able to get her follow up visit scheduled with provider. Requesting a call back.    Contact: [phone]

## 2022-07-02 ENCOUNTER — TELEPHONE (OUTPATIENT)
Dept: PULMONOLOGY | Facility: HOSPITAL | Age: 62
End: 2022-07-02
Payer: MEDICAID

## 2022-07-03 NOTE — TELEPHONE ENCOUNTER
61F with CPFE (on home O2) on Ofev, Kornofsky 70% previously evaluated by Pulm transplant, but not transplant candidate.    Called by patient that she tested + on at home COVID-19 test. Symptoms began today. Noted fatigue, but not dyspneic. Wears her home O2 and has not had increasing cough or dyspnea and has not required more FiO2. She feels well overall. She asked for recommendations.    At this time, she is treating symptoms supportively and resting. I encouraged her to call her PCP and/or her primary Pulmonologist. I encouraged her to proceed to ED if symptoms progressed. Continue to monitor O2 sats with pulse ox. Patient verbalized understanding.    Juan Adams DO  PGY-V, PCCM Fellow    Discussed with Dr. Nguyen.

## 2022-09-26 ENCOUNTER — TELEPHONE (OUTPATIENT)
Dept: TRANSPLANT | Facility: CLINIC | Age: 62
End: 2022-09-26
Payer: MEDICARE

## 2022-09-26 NOTE — TELEPHONE ENCOUNTER
Patient called to report that she had a reminder on her calendar to schedule annual follow up. She reports she is seeing Dr. Rai, she is in pulmonary rehab, and is using oxygen 24/7.  Patient reports her cardiologist is following her for for possible stent placement. She has an appt today. She is aware that she will should plan to follow her cardiology recs ('stent'?) per patient. And then follow up with our team, of which she is due in November. She is aware she will be contacted to schedule follow up. All questions answered at this time.

## 2022-09-29 ENCOUNTER — TELEPHONE (OUTPATIENT)
Dept: TRANSPLANT | Facility: CLINIC | Age: 62
End: 2022-09-29
Payer: MEDICARE

## 2022-09-29 DIAGNOSIS — Z76.82 LUNG TRANSPLANT CANDIDATE: ICD-10-CM

## 2022-09-29 DIAGNOSIS — J43.9 PULMONARY EMPHYSEMA WITH FIBROSIS OF LUNG: ICD-10-CM

## 2022-09-29 DIAGNOSIS — Z11.52 ENCOUNTER FOR PREOPERATIVE SCREENING LABORATORY TESTING FOR SEVERE ACUTE RESPIRATORY SYNDROME CORONAVIRUS 2 (SARS-COV-2): ICD-10-CM

## 2022-09-29 DIAGNOSIS — J84.112 IPF (IDIOPATHIC PULMONARY FIBROSIS): Primary | ICD-10-CM

## 2022-09-29 DIAGNOSIS — Z01.812 ENCOUNTER FOR PREOPERATIVE SCREENING LABORATORY TESTING FOR SEVERE ACUTE RESPIRATORY SYNDROME CORONAVIRUS 2 (SARS-COV-2): ICD-10-CM

## 2022-09-29 DIAGNOSIS — J84.10 PULMONARY EMPHYSEMA WITH FIBROSIS OF LUNG: ICD-10-CM

## 2022-09-29 NOTE — TELEPHONE ENCOUNTER
----- Message from Danny Cantrell sent at 9/29/2022 11:20 AM CDT -----  Regarding: call back  Pt call to schedule annual appt+    Call

## 2022-11-09 ENCOUNTER — TELEPHONE (OUTPATIENT)
Dept: TRANSPLANT | Facility: CLINIC | Age: 62
End: 2022-11-09
Payer: MEDICARE

## 2022-11-09 NOTE — TELEPHONE ENCOUNTER
----- Message from Dennis Pabon sent at 11/9/2022  8:11 AM CST -----  Regarding: Reschedule Appointments  Patient called in requesting to speak with staff to state she will need to reschedule her appts for 11/15 to some time in December.          Contact: [phone]

## 2022-12-29 ENCOUNTER — HOSPITAL ENCOUNTER (OUTPATIENT)
Dept: PULMONOLOGY | Facility: CLINIC | Age: 62
Discharge: HOME | End: 2022-12-29
Payer: MEDICARE

## 2022-12-29 ENCOUNTER — OFFICE VISIT (OUTPATIENT)
Dept: TRANSPLANT | Facility: CLINIC | Age: 62
End: 2022-12-29
Payer: MEDICARE

## 2022-12-29 VITALS
SYSTOLIC BLOOD PRESSURE: 134 MMHG | DIASTOLIC BLOOD PRESSURE: 71 MMHG | RESPIRATION RATE: 18 BRPM | BODY MASS INDEX: 25.78 KG/M2 | HEIGHT: 63 IN | OXYGEN SATURATION: 92 % | TEMPERATURE: 98 F | HEART RATE: 80 BPM | WEIGHT: 145.5 LBS

## 2022-12-29 VITALS — WEIGHT: 146.38 LBS | BODY MASS INDEX: 25.94 KG/M2 | HEIGHT: 63 IN

## 2022-12-29 DIAGNOSIS — J84.10 PULMONARY EMPHYSEMA WITH FIBROSIS OF LUNG: ICD-10-CM

## 2022-12-29 DIAGNOSIS — J96.11 CHRONIC RESPIRATORY FAILURE WITH HYPOXIA: ICD-10-CM

## 2022-12-29 DIAGNOSIS — J43.9 PULMONARY EMPHYSEMA WITH FIBROSIS OF LUNG: ICD-10-CM

## 2022-12-29 DIAGNOSIS — Z76.82 LUNG TRANSPLANT CANDIDATE: Primary | ICD-10-CM

## 2022-12-29 DIAGNOSIS — Z76.82 LUNG TRANSPLANT CANDIDATE: ICD-10-CM

## 2022-12-29 DIAGNOSIS — J84.9 INTERSTITIAL PULMONARY DISEASE, UNSPECIFIED: ICD-10-CM

## 2022-12-29 LAB
DLCO SINGLE BREATH LLN: 16.39
DLCO SINGLE BREATH PRE REF: 31 %
DLCO SINGLE BREATH REF: 22.13
DLCOC SBVA LLN: 3.1
DLCOC SBVA REF: 4.64
DLCOC SINGLE BREATH LLN: 16.39
DLCOC SINGLE BREATH REF: 22.13
DLCOCSBVAULN: 6.18
DLCOCSINGLEBREATHULN: 27.86
DLCOSINGLEBREATHULN: 27.86
DLCOVA LLN: 3.1
DLCOVA PRE REF: 43.9 %
DLCOVA PRE: 2.04 ML/(MIN*MMHG*L) (ref 3.1–6.18)
DLCOVA REF: 4.64
DLCOVAULN: 6.18
ERV LLN: -16449.21
ERV PRE REF: 79.8 %
ERV REF: 0.79
ERVULN: ABNORMAL
FEF 25 75 LLN: 1.12
FEF 25 75 PRE REF: 18.9 %
FEF 25 75 REF: 2.2
FEV05 LLN: 0.94
FEV05 REF: 1.79
FEV1 FVC LLN: 67
FEV1 FVC PRE REF: 66.6 %
FEV1 FVC REF: 79
FEV1 LLN: 1.81
FEV1 PRE REF: 45.4 %
FEV1 REF: 2.4
FRCPLETH LLN: 1.82
FRCPLETH PREREF: 138 %
FRCPLETH REF: 2.64
FRCPLETHULN: 3.47
FVC LLN: 2.3
FVC PRE REF: 67.8 %
FVC REF: 3.04
IVC PRE: 2.19 L (ref 2.3–3.82)
IVC SINGLE BREATH LLN: 2.3
IVC SINGLE BREATH PRE REF: 71.8 %
IVC SINGLE BREATH REF: 3.04
IVCSINGLEBREATHULN: 3.82
LLN IC: -16448.05
PEF LLN: 4.48
PEF PRE REF: 66.7 %
PEF REF: 6.14
PHYSICIAN COMMENT: ABNORMAL
PRE DLCO: 6.86 ML/(MIN*MMHG) (ref 16.39–27.86)
PRE ERV: 0.63 L (ref -16449.21–16450.79)
PRE FEF 25 75: 0.42 L/S (ref 1.12–3.66)
PRE FET 100: 7.11 SEC
PRE FEV05 REF: 43.3 %
PRE FEV1 FVC: 52.92 % (ref 67.34–89.73)
PRE FEV1: 1.09 L (ref 1.81–2.97)
PRE FEV5: 0.78 L (ref 0.94–2.65)
PRE FRC PL: 3.65 L (ref 1.82–3.47)
PRE FVC: 2.06 L (ref 2.3–3.82)
PRE IC: 1.48 L (ref -16448.05–16451.95)
PRE PEF: 4.1 L/S (ref 4.48–7.8)
PRE REF IC: 76 %
PRE RV: 3.02 L (ref 1.28–2.43)
PRE TLC: 5.13 L (ref 3.78–5.76)
PRE VTG: 3.91 L
RAW PRE REF: 184 %
RAW PRE: 5.63 CMH2O*S/L (ref 3.06–3.06)
RAW REF: 3.06
REF IC: 1.95
RV LLN: 1.28
RV PRE REF: 162.8 %
RV REF: 1.86
RVTLC LLN: 30
RVTLC PRE REF: 149.6 %
RVTLC PRE: 58.89 % (ref 29.77–48.95)
RVTLC REF: 39
RVTLCULN: 49
RVULN: 2.43
SGAW PRE REF: 41.6 %
SGAW PRE: 0.04 1/(CMH2O*S) (ref 0.1–0.1)
SGAW REF: 0.1
TLC LLN: 3.78
TLC PRE REF: 107.5 %
TLC REF: 4.77
TLC ULN: 5.76
ULN IC: ABNORMAL
VA PRE: 3.37 L (ref 4.62–4.62)
VA SINGLE BREATH LLN: 4.62
VA SINGLE BREATH PRE REF: 72.8 %
VA SINGLE BREATH REF: 4.62
VASINGLEBREATHULN: 4.62
VC LLN: 2.3
VC PRE REF: 69.3 %
VC PRE: 2.11 L (ref 2.3–3.82)
VC REF: 3.04
VC ULN: 3.82

## 2022-12-29 PROCEDURE — 99999 PR PBB SHADOW E&M-EST. PATIENT-LVL IV: CPT | Mod: PBBFAC,TXP,, | Performed by: INTERNAL MEDICINE

## 2022-12-29 PROCEDURE — 99214 OFFICE O/P EST MOD 30 MIN: CPT | Mod: PBBFAC,TXP,25 | Performed by: INTERNAL MEDICINE

## 2022-12-29 PROCEDURE — 99214 PR OFFICE/OUTPT VISIT, EST, LEVL IV, 30-39 MIN: ICD-10-PCS | Mod: 25,S$PBB,NTX, | Performed by: INTERNAL MEDICINE

## 2022-12-29 PROCEDURE — 94618 PULMONARY STRESS TESTING: CPT | Mod: 26,S$PBB,NTX, | Performed by: INTERNAL MEDICINE

## 2022-12-29 PROCEDURE — 94618 PULMONARY STRESS TESTING: ICD-10-PCS | Mod: 26,S$PBB,NTX, | Performed by: INTERNAL MEDICINE

## 2022-12-29 PROCEDURE — 94060 EVALUATION OF WHEEZING: CPT | Mod: 26,S$PBB,NTX, | Performed by: INTERNAL MEDICINE

## 2022-12-29 PROCEDURE — 94726 PLETHYSMOGRAPHY LUNG VOLUMES: CPT | Mod: PBBFAC,NTX | Performed by: INTERNAL MEDICINE

## 2022-12-29 PROCEDURE — 94060 EVALUATION OF WHEEZING: CPT | Mod: PBBFAC,NTX | Performed by: INTERNAL MEDICINE

## 2022-12-29 PROCEDURE — 94729 DIFFUSING CAPACITY: CPT | Mod: PBBFAC,NTX | Performed by: INTERNAL MEDICINE

## 2022-12-29 PROCEDURE — 94060 PR EVAL OF BRONCHOSPASM: ICD-10-PCS | Mod: 26,S$PBB,NTX, | Performed by: INTERNAL MEDICINE

## 2022-12-29 PROCEDURE — 99214 OFFICE O/P EST MOD 30 MIN: CPT | Mod: 25,S$PBB,NTX, | Performed by: INTERNAL MEDICINE

## 2022-12-29 PROCEDURE — 94726 PULM FUNCT TST PLETHYSMOGRAP: ICD-10-PCS | Mod: 26,S$PBB,NTX, | Performed by: INTERNAL MEDICINE

## 2022-12-29 PROCEDURE — 94726 PLETHYSMOGRAPHY LUNG VOLUMES: CPT | Mod: 26,S$PBB,NTX, | Performed by: INTERNAL MEDICINE

## 2022-12-29 PROCEDURE — 94729 DIFFUSING CAPACITY: CPT | Mod: 26,S$PBB,NTX, | Performed by: INTERNAL MEDICINE

## 2022-12-29 PROCEDURE — 99999 PR PBB SHADOW E&M-EST. PATIENT-LVL IV: ICD-10-PCS | Mod: PBBFAC,TXP,, | Performed by: INTERNAL MEDICINE

## 2022-12-29 PROCEDURE — 94618 PULMONARY STRESS TESTING: CPT | Mod: PBBFAC,NTX | Performed by: INTERNAL MEDICINE

## 2022-12-29 PROCEDURE — 94729 PR C02/MEMBANE DIFFUSE CAPACITY: ICD-10-PCS | Mod: 26,S$PBB,NTX, | Performed by: INTERNAL MEDICINE

## 2022-12-29 NOTE — LETTER
January 3, 2023        Denis Rai  [address] [facility] [place] [address]  Phone: [phone]  Fax: [phone]             Sunil Kam - Transplant [facility]  [address]  [facility] [address]  Phone: [phone]   Patient: Antonella Chase   MR Number: [id number]   YOB: 1960   Date of Visit: 12/29/2022       Dear Dr. Denis Rai    Thank you for referring Antonella Chase to me for evaluation. Attached you will find relevant portions of my assessment and plan of care.    If you have questions, please do not hesitate to call me. I look forward to following Antonella Chase along with you.    Sincerely,    Syeda Arango, DO    Enclosure    If you would like to receive this communication electronically, please contact [email or web address] or [phone] to request [employer] Link access.    [employer] is a tool which provides read-only access to select patient information with whom you have a relationship. Its easy to use and provides real time access to review your patients record including encounter summaries, notes, results, and demographic information.    If you feel you have received this communication in error or would no longer like to receive these types of communications, please e-mail [email or web address]

## 2022-12-30 ENCOUNTER — TELEPHONE (OUTPATIENT)
Dept: TRANSPLANT | Facility: CLINIC | Age: 62
End: 2022-12-30
Payer: MEDICARE

## 2022-12-30 NOTE — PROCEDURES
Antonella Chase is a 62 y.o.  female patient, who presents for a 6 minute walk test ordered by DO Conchita.  The diagnosis is Qualify for Oxygen; Pulmonary Fibrosis.  The patient's BMI is 25.9 kg/m2. Predicted distance (lower limit of normal) is 366.58 meters.    Test Results:    The test was completed without stopping.  The total time walked was 360 seconds.  During walking, the patient reported:  Chest pain, Dyspnea, Leg pain, Lightheadedness.  The patient used supplemental oxygen during testing.     12/29/2022---------Distance: 396.24 meters (1300 feet)     Time O2 Sat % Supplemental Oxygen Heart Rate Blood Pressure Stephanie Scale Comment   Pre-exercise  (Resting) 0 88 % Room Air 95 bpm 153/71 mmHg 3    Pre-exercise  (Resting) 0 97 % 2 L/M 80 bpm 160/67 mmHg 0    During Exercise 1 min 92 % 2 L/M 95 bpm      During Exercise 2 min 86 % 2 L/M 97 bpm   Oxygen Increased   During Exercise 3 min 91 % 3 L/M 95 bpm      During Exercise 4 min 92 % 3 L/M 101 bpm      During Exercise 5 min 90 % 3 L/M 98 bpm      During Exercise 6 min 91 % 3 L/M 81 bpm 135/61 mmHg 5-6    Post-exercise    98 % 3 L/M  84 bpm 140/77 mmHg       Recovery Time: 128 seconds    Oxygen Qualification:     O2 Sat % Supplemental Oxygen Heart Rate Blood Pressure Stephanie Scale   Pre-exercise  (Resting) 88 % Room Air  95 bpm  153/71 mmHg 3      Performing nurse/tech: Mariajose BERNAL      PREVIOUS STUDY:   11/18/2021---------Distance: 457.2 meters (1500 feet)       O2 Sat % Supplemental Oxygen Heart Rate Blood Pressure Stephanie Scale   Pre-exercise  (Resting) 96 % Room Air 70 bpm 111/56 mmHg 2   During Exercise 88 % Room Air 91 bpm 140/65 mmHg 5-6   Post-exercise    97 % Room Air  73 bpm 129/67 mmHg         CLINICAL INTERPRETATION:  Six minute walk distance is 396.24 meters (1300 feet) with heavy dyspnea.  At rest, there was significant desaturation while breathing room air.  During exercise, there was significant desaturation while breathing supplemental  oxygen.  Both blood pressure and heart rate remained stable with walking.  Hypertension was present prior to exercise.  The patient reported non-pulmonary symptoms during exercise.  The patient may benefit from using supplemental oxygen.  Since the previous study in November 2021, exercise capacity may be somewhat worse.  Based upon age and body mass index, exercise capacity is normal.   Oxygen saturation did improve while breathing supplemental oxygen.

## 2022-12-30 NOTE — TELEPHONE ENCOUNTER
Request received via message from patient message. Routed to primary coordinator for follow up. Patient advised at office visit on 12/29 that ordering provider requested she have imaging at [facility] facility. Will need to follow up .  ----- Message from Sharla Breaux MA sent at 12/30/2022  8:56 AM CST -----  Regarding: Fax Orders  Contact: [phone]  Patient states that orders for the CT Scan need to be sent to [facility]      Phone number [phone]  Fax [phone]

## 2023-01-02 ENCOUNTER — PATIENT MESSAGE (OUTPATIENT)
Dept: TRANSPLANT | Facility: CLINIC | Age: 63
End: 2023-01-02
Payer: MEDICARE

## 2023-01-03 ENCOUNTER — TELEPHONE (OUTPATIENT)
Dept: TRANSPLANT | Facility: CLINIC | Age: 63
End: 2023-01-03
Payer: MEDICARE

## 2023-01-03 DIAGNOSIS — J84.10 PULMONARY EMPHYSEMA WITH FIBROSIS OF LUNG: ICD-10-CM

## 2023-01-03 DIAGNOSIS — J84.9 INTERSTITIAL PULMONARY DISEASE, UNSPECIFIED: ICD-10-CM

## 2023-01-03 DIAGNOSIS — J84.112 IPF (IDIOPATHIC PULMONARY FIBROSIS): Primary | ICD-10-CM

## 2023-01-03 DIAGNOSIS — J43.9 PULMONARY EMPHYSEMA WITH FIBROSIS OF LUNG: ICD-10-CM

## 2023-01-03 NOTE — TELEPHONE ENCOUNTER
----- Message from Sharla Breaux MA sent at 1/3/2023  9:34 AM CST -----  Regarding: CT orders  Patient is requesting CT orders be sent to [facility]. Please call and advise.       Phone number [phone]    Contacted patient.  Informed her that I saw her message via the patient portal.  Informed her that I was out of the office last week and we were out of the office yesterday in observance of New Year's holiday.  Informed her that I reviewed Dr. Arango's clinic note and she ordered a repeat CT of the chest and echo.  Informed her that I will speak with Dr. Arango to see if both tests can be performed locally, or if the tests can be repeated with her follow-up appointment in 3 months.  Also inquired about the oxygen.  Patient stated that she had asked for an order for portable oxygen.  She stated that she was prescribed oxygen in the past, but she is unable to use the oxygen tanks.  She is requesting that we send an order for a portable concentrator to [facility].  Informed her that I would discuss with Dr. Arango and will send her a message via [facility] with the plan.  She verbalized her understanding.    Discussed above with Dr. Arango.  Instructions received for a CT of the chest and 2D echo now locally.  Orders also received for a portable oxygen concentrator for 2 liters of oxygen at rest and 3 liters of oxygen with exertion.

## 2023-01-03 NOTE — PROGRESS NOTES
"LUNG TRANSPLANT PRE FOLLOW-UP    Referring Physician: Denis Rai    Reason for Visit:  Pre-lung transplant follow-up.         Date of Initial Evaluation:                                                                                              History of Present Illness: Antonella Chase is a 62 y.o. female who is on 2L of oxygen. She is on no assisted ventilation.  Her New York Heart Association Class is III and a Karnofsky score of 70% - Cares for self: Unable to carry on normal activity or active work. She is not diabetic.     Patient returns for follow up visit.  Since her last visit, she has been relatively stable.  She does not recognize any significant dyspnea above baseline.  She does endorse difficulty with climbing stairs and walking inclines.  Takes inhaler therapy as prescribed and takes OFEV for IPF.  Overall, remains stable.      Review of Systems   Constitutional:  Negative for chills, diaphoresis, fever, malaise/fatigue and weight loss.   HENT:  Negative for congestion, nosebleeds and sinus pain.    Eyes:  Negative for blurred vision.   Respiratory:  Positive for cough (chronic, dry) and shortness of breath (with exertion, at baseline). Negative for hemoptysis, sputum production and wheezing.    Cardiovascular:  Negative for chest pain, palpitations, orthopnea, claudication and leg swelling.   Gastrointestinal:  Negative for abdominal pain, constipation, diarrhea, heartburn, nausea and vomiting.   Genitourinary:  Negative for dysuria, frequency and urgency.   Musculoskeletal:  Negative for myalgias.   Skin:  Negative for itching and rash.   Neurological:  Negative for dizziness, tremors and headaches.   Psychiatric/Behavioral:  Negative for substance abuse. The patient is not nervous/anxious.    Objective:   /71 (BP Location: Left arm, Patient Position: Sitting, BP Method: Medium (Automatic))   Pulse 80   Temp 98.1 °F (36.7 °C) (Oral)   Resp 18   Ht 5' 3" (1.6 m)   Wt 66 kg (145 lb " 8.1 oz)   SpO2 (!) 92% Comment: room air  BMI 25.77 kg/m²   Physical Exam  Constitutional:       Appearance: She is well-developed.   HENT:      Head: Normocephalic and atraumatic.   Eyes:      Conjunctiva/sclera: Conjunctivae normal.   Cardiovascular:      Rate and Rhythm: Normal rate and regular rhythm.      Heart sounds: Normal heart sounds.   Pulmonary:      Effort: Pulmonary effort is normal.      Breath sounds: Rales (trace) present.      Comments: Mildly diminished at the bases  Abdominal:      General: Bowel sounds are normal.      Palpations: Abdomen is soft.   Musculoskeletal:         General: Normal range of motion.      Cervical back: Normal range of motion and neck supple.   Skin:     General: Skin is warm and dry.   Neurological:      Mental Status: She is alert and oriented to person, place, and time.     Labs:  Hospital Outpatient Visit on 12/29/2022   Component Date Value    Physician Comment 12/29/2022                      Value:Spirometry shows severe obstruction. Lung volume determination shows TLC is normal with evidence air trapping is present. Airway mechanics are abnormal showing increased airway resistance and decreased conductance. DLCO is severely decreased.   Â   Notes: No recent hemoglobin value available. DLCO interpretation assumes a normal hemoglobin value.       Pre FVC 12/29/2022 2.06 (L)     PRE FEV5 12/29/2022 0.78 (L)     Pre FEV1 12/29/2022 1.09 (L)     Pre FEV1 FVC 12/29/2022 52.92 (L)     Pre FEF 25 75 12/29/2022 0.42 (L)     Pre PEF 12/29/2022 4.10 (L)     Pre  12/29/2022 7.11     Pre DLCO 12/29/2022 6.86 (L)     DLCOVA PRE 12/29/2022 2.04 (L)     VA PRE 12/29/2022 3.37 (L)     IVC PRE 12/29/2022 2.19 (L)     Pre TLC 12/29/2022 5.13     VC PRE 12/29/2022 2.11 (L)     PRE IC 12/29/2022 1.48     Pre FRC PL 12/29/2022 3.65 (H)     Pre ERV 12/29/2022 0.63     Pre RV 12/29/2022 3.02 (H)     RVTLC PRE 12/29/2022 58.89 (H)     Raw PRE 12/29/2022 5.63 (H)     sGaw PRE  12/29/2022 0.04 (L)     Pre VTG 12/29/2022 3.91     FVC Ref 12/29/2022 3.04     FVC LLN 12/29/2022 2.30     FVC Pre Ref 12/29/2022 67.8     FEV05 REF 12/29/2022 1.79     FEV05 LLN 12/29/2022 0.94     PRE FEV05 REF 12/29/2022 43.3     FEV1 Ref 12/29/2022 2.40     FEV1 LLN 12/29/2022 1.81     FEV1 Pre Ref 12/29/2022 45.4     FEV1 FVC Ref 12/29/2022 79     FEV1 FVC LLN 12/29/2022 67     FEV1 FVC Pre Ref 12/29/2022 66.6     FEF 25 75 Ref 12/29/2022 2.20     FEF 25 75 LLN 12/29/2022 1.12     FEF 25 75 Pre Ref 12/29/2022 18.9     PEF Ref 12/29/2022 6.14     PEF LLN 12/29/2022 4.48     PEF Pre Ref 12/29/2022 66.7     TLC Ref 12/29/2022 4.77     TLC LLN 12/29/2022 3.78     TLC ULN 12/29/2022 5.76     TLC Pre Ref 12/29/2022 107.5     VC Ref 12/29/2022 3.04     VC LLN 12/29/2022 2.30     VC ULN 12/29/2022 3.82     VC Pre Ref 12/29/2022 69.3     REF IC 12/29/2022 1.95     LLN IC 12/29/2022 -32359.05     ULN IC 12/29/2022 15999.95     PRE REF IC 12/29/2022 76.0     FRCpleth Ref 12/29/2022 2.64     FRCpleth LLN 12/29/2022 1.82     FRC PLETH ULN 12/29/2022 3.47     FRCpleth PreRef 12/29/2022 138.0     ERV Ref 12/29/2022 0.79     ERV LLN 12/29/2022 -47975.21     ERV ULN 12/29/2022 89477.79     ERV Pre Ref 12/29/2022 79.8     RV Ref 12/29/2022 1.86     RV LLN 12/29/2022 1.28     RV ULN 12/29/2022 2.43     RV Pre Ref 12/29/2022 162.8     RVTLC Ref 12/29/2022 39     RVTLC LLN 12/29/2022 30     RV TLC ULN 12/29/2022 49     RVTLC Pre Ref 12/29/2022 149.6     Raw Ref 12/29/2022 3.06     Raw Pre Ref 12/29/2022 184.0     sGaw Ref 12/29/2022 0.10     sGaw Pre Ref 12/29/2022 41.6     DLCO Single Breath Ref 12/29/2022 22.13     DLCO Single Breath LLN 12/29/2022 16.39     DLCO SINGLEBREATH ULN 12/29/2022 27.86     DLCO Single Breath Pre R* 12/29/2022 31.0     DLCOc Single Breath Ref 12/29/2022 22.13     DLCOc Single Breath LLN 12/29/2022 16.39     DLCOC SINGLEBREATH ULN 12/29/2022 27.86     DLCOVA Ref 12/29/2022 4.64     DLCOVA LLN 12/29/2022  3.10     DLCOVA ULN 12/29/2022 6.18     DLCOVA Pre Ref 12/29/2022 43.9     DLCOc SBVA Ref 12/29/2022 4.64     DLCOc SBVA LLN 12/29/2022 3.10     DLCOCSBVA ULN 12/29/2022 6.18     VA Single Breath Ref 12/29/2022 4.62     VA Single Breath LLN 12/29/2022 4.62     VA SINGLEBREATH ULN 12/29/2022 4.62     VA Single Breath Pre Ref 12/29/2022 72.8     IVC Single Breath Ref 12/29/2022 3.04     IVC Single Breath LLN 12/29/2022 2.30     IVC SINGLEBREATH ULN 12/29/2022 3.82     IVC Single Breath Pre Ref 12/29/2022 71.8        Pulmonary Function Tests 11/18/2021 10/20/2020 5/4/2020   FVC 2.56 2.31 2.5   FEV1 1.39 1.17 1.32   TLC (liters) 4.52 4.36 5.41   DLCO (ml/mmHg sec) 8.9 11.7 9.8   FVC% 83 74 76   FEV1% 57 47 51   FEF 25-75 0.61 - -   FEF 25-75% 27 - -   TLC% 95 91 106   RV 1.74 2.03 2.85   RV% 95 111 147   DLCO% 40 52 40     6MW 12/29/2022 11/18/2021 2/4/2021   6MWT Status completed without stopping completed without stopping completed without stopping   Patient Reported Chest pain;Dyspnea;Leg pain;Lightheadedness Chest pain;Dyspnea Dyspnea;Leg pain;Lightheadedness   Was O2 used? Yes No No   Delivery Method Cannula;Pull Tank;Continuous Flow - -   6MW Distance walked (feet) 1300 1500 1300   Distance walked (meters) 396.24 457.2 396.24   Did patient stop? No No No   Oxygen Saturation 97 96 94   Supplemental Oxygen 2 L/M Room Air Room Air   Heart Rate 80 70 90   Blood Pressure 160/67 111/56 133/69   Stephanie Dyspnea Rating  nothing at all light very light   Oxygen Saturation 91 88 89   Supplemental Oxygen 2 L/M Room Air Room Air   Heart Rate 81 91 105   Blood Pressure 135/61 140/65 143/70   Stephanie Dyspnea Rating  heavy heavy moderate   Recovery Time (seconds) 128 162 91   Oxygen Saturation 98 97 94   Supplemental Oxygen 2 L/M Room Air Room Air   Heart Rate 84 73 -       6MWT OSH  10/02/2020  Starting SPO2 98%, HR 73, on RA  Lowest SPO2 93%, HR 85 at completion, on RA  Distance: 305 meters    Cardiodiagnostics:  TTE  OS  10/02/20  -LVEF 40-45%, grade I diastolic dysfunction    Assessment:-  1. Lung transplant candidate    2. Interstitial pulmonary disease, unspecified    3. Chronic respiratory failure with hypoxia      Plan:   Pt followed for combined COPD and IPF.  FVC and DLCO have declined from previous.  Given the offsetting obstructive and restrictive processes, will check CT chest to further assess whether her IPF is progressing and if so how much radiographically.  Continue with OFEV.  GERD and nasal symptoms controlled.  Continue with inhaler regimen as prescribed.  No recent exacerbations or hospitalizations.  Continue with supplemental oxygen at 2L as prescribed.  Had a RHC last year without evidence of PH.  Repeat TTE now and on an annual basis.    With regards to her lung transplant candidacy for a diagnosis of combined COPD/IPF, she is likely still early for transplant considerations.  Will repeat CT chest and TTE to further assess whether or not her IPF is progressing and if so how much.  Follow-up in 3 months.      Syeda Arango D.O.  Pulmonary/Critical Care and Lung Transplantation  [facility]

## 2023-01-04 ENCOUNTER — TELEPHONE (OUTPATIENT)
Dept: TRANSPLANT | Facility: HOSPITAL | Age: 63
End: 2023-01-04
Payer: MEDICARE

## 2023-01-04 NOTE — TELEPHONE ENCOUNTER
Orders for portable concentrator faxed to [facility] per patient request fax [phone], phone [phone].  However, per Robles at [facility], within [facility] they have their own policies about distributing portable oxygen concentrators and patient does not meet the policy at this time.   Robles will reach out to the patient to explain this situation to her.  RUCHI faxed the orders and last pulmonology progress note to Robles at [facility] this afternoon.  RUCHI did notify the team of [facility]'s response as well.   Transplant SW remains available.

## 2023-01-04 NOTE — TELEPHONE ENCOUNTER
----- Message from Padmini Fofana sent at 1/4/2023 11:05 AM CST -----  Regarding: Speak to coordinator  Pt calling to speak to coordinator regarding, CT scan and Echo.    [phone] (home)     Contacted patient.  She stated that she spoke with someone at [facility] who told her that they received correspondence from Dr. Arango, but not the orders for the CT of the chest and the echo.  Informed patient that I would contact [employer] to schedule the testing.  She verbalized her understanding.    Contacted [facility] Scheduling Department ([phone]).  Spoke with Tiffanie.  Notified her of Dr. Arango's orders for a CT of the Chest without contrast and 2D echo with color flow doppler and patient's request to have it done locally at [facility].  CT of the chest scheduled for 1/12/23 at 11 am and the echo scheduled at 1 pm the same day at the hospital in [place].  Tiffanie requested to have the orders faxed to [phone].    Contacted patient and notified her of the date, times, and locations of the above testing.  Appointments written down and read back per patient.    Orders faxed.

## 2023-01-10 ENCOUNTER — TELEPHONE (OUTPATIENT)
Dept: TRANSPLANT | Facility: CLINIC | Age: 63
End: 2023-01-10
Payer: MEDICARE

## 2023-01-10 NOTE — TELEPHONE ENCOUNTER
----- Message from Dennis Pabon sent at 1/10/2023  2:54 PM CST -----  Regarding: Test Orders  [facility] called to request orders be sent to their facility for patient to complete Echo test as instructed. Patient will be showing up tomorrow to complete.        Fax: [phone]

## 2023-01-10 NOTE — TELEPHONE ENCOUNTER
----- Message from Dennis Pabon sent at 1/10/2023  2:54 PM CST -----  Regarding: Test Orders  [facility] called to request orders be sent to their facility for patient to complete Echo test as instructed. Patient will be showing up tomorrow to complete.        Fax: [phone]    Orders for echo faxed to the above fax number.      Contacted [facility]'s Scheduling Department and spoke with Liliana.  She confirmed that orders were received on 1/4/23 for both the CT of the chest and echo.  Informed her that the echo department may have been calling for the orders.  Informed her that the orders were faxed to the fax number that was left in the original message.

## 2023-01-11 ENCOUNTER — TELEPHONE (OUTPATIENT)
Dept: TRANSPLANT | Facility: CLINIC | Age: 63
End: 2023-01-11
Payer: MEDICARE

## 2023-01-11 NOTE — TELEPHONE ENCOUNTER
----- Message from Jaclyn Hannah sent at 1/11/2023 11:42 AM CST -----  Regarding: auth needed for CT scan scheduled tomorrow  Patient is scheduled for CT scan of the chest tomorrow  Her insurance is requiring authorization  Also scheduled for an echo   933.06 per [employer] also requires an authorization as well      Please contact Idris Polanco at your earliest opportunity @# [phone]     Contacted Terrence, , regarding obtaining authorization for a non-[facility] facility.  She stated that she would contact [facility].   Received a call back from Terrence.  Informed that [facility] will not obtain authorizations for external providers.  Informed that patient has to have the testing here for her to obtain the authorization since she doesn't have the Tax ID, NPI, etc for [facility].     Contacted patient and informed her of above.  She stated that she will contact Dr. Rai for him to order the tests.  She stated that she will call back to let me know the outcome.  She asked that I wait before I contact [facility] to cancel the previously scheduled appointments.    Received a call from Allie with [facility] requesting a new order for the echo.  She stated that the order that was received is not for a 2D echo with CFD.  The order is for an echo saline bubble.  Informed Allie that the order that was sent is the order for the echo here.  Informed her that patient is calling her local provider to see if he can order the testing.  Informed her that I would call her back to let her know the status of the testing tomorrow.  She verbalized her understanding and requested that I call her at [phone].    Received a call from Zunilda with [facility].  She stated that she received a call from the patient requesting that Dr. Rai order a CT of the chest and a 2D echo with CFD.  Informed Zunilda of Dr. Arango's orders for an echo and CT of chest.  Informed her  that patient requested to have the testing locally instead of an [facility] facility.  Informed her that we received a call today requesting an authorization for the testing.  Informed her that our  contacted the scheduling department at [facility] regarding the authorization.  Informed her that [facility] is unable to authorize the testing since an external provider ordered the testing.  Also informed her that I was told that patient had to come here for the testing in order for our  to authorize the testing since she does not have the information for [facility] (tax ID,, NPI).  Informed her that I explained the above to patient and she informed me that she would call Dr. Rai's office to order.  Zunilda stated that patient hasn't been seen since September and she doesn't have recent clinical information.  She stated that she has the information for [facility] and provided me with the information.  Informed Zunilda that we would take care of the appointments.    Contacted [facility] Transplant Line.  Left a message regarding authorization for CT of chest and 2D echo that is scheduled for tomorrow.    Contacted patient.  Informed her of my conversation with Zunilda at Dr. Rai's office.  Informed her that I left a message for [facility] regarding the authorization, but I haven't received a return call.  Informed her that the testing may not be authorized prior to the appointments tomorrow.  Patient requested to cancel the appointments.  She stated that she will contact [facility] tomorrow regarding the authorization and will call me back with the outcome.  Informed patient that I will cancel the appointments for tomorrow.    Contacted [facility]'s Scheduling Department.  Spoke with Tiffanie.  Informed her that the authorization for the CT of the chest and echo still has not been received.  Instructed Tiffanie to cancel the testing.  Testing canceled per Tiffanie.    Attempted  to contact Allie regarding the orders for the echo.  No answer.  Left a message informing her that the testing has been canceled for tomorrow.  Informed her that I would call her back regarding the orders if the authorization is received and echo is reordered there.

## 2023-01-12 ENCOUNTER — TELEPHONE (OUTPATIENT)
Dept: TRANSPLANT | Facility: CLINIC | Age: 63
End: 2023-01-12
Payer: MEDICARE

## 2023-01-12 NOTE — TELEPHONE ENCOUNTER
----- Message from Jaclyn Hannah sent at 1/12/2023  8:07 AM CST -----  Regarding: Auth needed CT/echo  [employer] called requesting to Speak to Nurse Rebecca  Regarding the authorization for the CT scan and echocardiogram on patient    States the doctor has to call into [employer] to start the auth process for the above testing    Call @# [phone] (Meredith)      Contacted [facility] regarding authorization for CT of chest and TTE.  Spoke with Rebecca.  Informed that 2 other vendors will need to be contacted to obtain the authorization for the CT of the chest and the echo.  Obtained the phone numbers of the 2 different vendors.  CT of chest [phone] and echo [phone].  Information written down and read for correctness.      Contacted [facility] Scheduling Department.  Spoke with Meredith. CT of chest and echo rescheduled for 1/20/23 (echo at 1 pm and CT of chest at 2pm).    Contacted [facility] Radiology at [phone] to obtain authorization for CT of chest.  Spoke with Rebecca.  Authorization obtained for 1/20/23 - 2/19/23.  Authorization number [id number].    [facility] to contact [facility] regarding the echo order.  No answer.  Left a message requesting a return call regarding the echo order.

## 2023-01-18 ENCOUNTER — TELEPHONE (OUTPATIENT)
Dept: TRANSPLANT | Facility: CLINIC | Age: 63
End: 2023-01-18
Payer: MEDICARE

## 2023-01-18 DIAGNOSIS — J84.112 IPF (IDIOPATHIC PULMONARY FIBROSIS): Primary | ICD-10-CM

## 2023-01-18 DIAGNOSIS — J84.10 PULMONARY EMPHYSEMA WITH FIBROSIS OF LUNG: ICD-10-CM

## 2023-01-18 DIAGNOSIS — J43.9 PULMONARY EMPHYSEMA WITH FIBROSIS OF LUNG: ICD-10-CM

## 2023-01-18 DIAGNOSIS — Z76.82 LUNG TRANSPLANT CANDIDATE: ICD-10-CM

## 2023-01-19 NOTE — TELEPHONE ENCOUNTER
Discussed patient during the ALD / pre-lung transplant patient review on 1/10/23.  Instructions received from Dr. Arango for a follow-up appointment in 3 months with complete PFTs and a 6 minute walk test.

## 2023-01-23 ENCOUNTER — PATIENT MESSAGE (OUTPATIENT)
Dept: TRANSPLANT | Facility: CLINIC | Age: 63
End: 2023-01-23
Payer: MEDICARE

## 2023-01-26 ENCOUNTER — TELEPHONE (OUTPATIENT)
Dept: TRANSPLANT | Facility: CLINIC | Age: 63
End: 2023-01-26
Payer: MEDICARE

## 2023-01-26 NOTE — TELEPHONE ENCOUNTER
----- Message from Jaclyn Hannah sent at 1/26/2023  8:33 AM CST -----  Regarding: PA needed today ##  Idris Ploanco  (May) called requesting Prior auth needed for an echo cardiogram scheduled for 1 pm today  [facility]    Please contact at your earliest opportunity @# [phone]  May    Contacted May with [facility].  She stated that authorization for the echo needed to be obtained.    Contacted [employer] to obtain authorization for echo.  Authorization # [id number].  Approved 1/26/23 - 4/26/23.    Contacted May and notified her of the authorization.  Provided her with the authorization number and dates.

## 2023-01-26 NOTE — TELEPHONE ENCOUNTER
----- Message from Danny Cantrell sent at 1/26/2023  9:11 AM CST -----  Regarding: call back  Pt call to speak with Rebecca in regards to an upcoming appt she had schedule at [facility]    Call     Contacted patient.  She stated that the testing was rescheduled since the echo was not authorized.  She stated that the testing was canceled last week because there was an issue with the machine.  Informed patient that the echo has been authorized.  She stated that the CT of the chest and echo have been rescheduled to 2/3/23.

## 2023-02-10 ENCOUNTER — PATIENT MESSAGE (OUTPATIENT)
Dept: TRANSPLANT | Facility: CLINIC | Age: 63
End: 2023-02-10
Payer: MEDICARE

## 2023-02-10 ENCOUNTER — TELEPHONE (OUTPATIENT)
Dept: TRANSPLANT | Facility: CLINIC | Age: 63
End: 2023-02-10
Payer: MEDICARE

## 2023-02-10 NOTE — TELEPHONE ENCOUNTER
----- Message from Syeda Arango DO sent at 2/10/2023  9:03 AM CST     Regarding: RE: echo and CT of chest report    Echo looks good.  No signs of PH    Message sent to patient regarding the echo results.    ----- Message -----  From: Rebecca Hernandez RN  Sent: 2/9/2023   5:34 PM CST  To: Syeda Arango DO  Subject: echo and CT of chest report                      Patient had the CT of chest and echo yesterday.  Report is in Care Everywhere.  I will contact [employer] to see if the CT film can be imported.

## 2023-03-03 ENCOUNTER — DOCUMENTATION ONLY (OUTPATIENT)
Dept: TRANSPLANT | Facility: CLINIC | Age: 63
End: 2023-03-03
Payer: MEDICARE

## 2023-03-29 ENCOUNTER — TELEPHONE (OUTPATIENT)
Dept: TRANSPLANT | Facility: CLINIC | Age: 63
End: 2023-03-29
Payer: MEDICARE

## 2023-03-30 ENCOUNTER — HOSPITAL ENCOUNTER (OUTPATIENT)
Dept: PULMONOLOGY | Facility: CLINIC | Age: 63
Discharge: HOME | End: 2023-03-30
Payer: MEDICARE

## 2023-03-30 ENCOUNTER — OFFICE VISIT (OUTPATIENT)
Dept: TRANSPLANT | Facility: CLINIC | Age: 63
End: 2023-03-30
Payer: MEDICARE

## 2023-03-30 VITALS
OXYGEN SATURATION: 94 % | HEART RATE: 76 BPM | DIASTOLIC BLOOD PRESSURE: 64 MMHG | BODY MASS INDEX: 24.57 KG/M2 | SYSTOLIC BLOOD PRESSURE: 130 MMHG | BODY MASS INDEX: 24.57 KG/M2 | HEIGHT: 63 IN | WEIGHT: 138.69 LBS | HEIGHT: 63 IN | RESPIRATION RATE: 18 BRPM | WEIGHT: 138.69 LBS

## 2023-03-30 DIAGNOSIS — J84.10 PULMONARY EMPHYSEMA WITH FIBROSIS OF LUNG: ICD-10-CM

## 2023-03-30 DIAGNOSIS — J84.112 IPF (IDIOPATHIC PULMONARY FIBROSIS): ICD-10-CM

## 2023-03-30 DIAGNOSIS — J84.112 IPF (IDIOPATHIC PULMONARY FIBROSIS): Primary | ICD-10-CM

## 2023-03-30 DIAGNOSIS — Z76.82 LUNG TRANSPLANT CANDIDATE: ICD-10-CM

## 2023-03-30 DIAGNOSIS — J43.9 PULMONARY EMPHYSEMA WITH FIBROSIS OF LUNG: ICD-10-CM

## 2023-03-30 DIAGNOSIS — J96.11 CHRONIC RESPIRATORY FAILURE WITH HYPOXIA: ICD-10-CM

## 2023-03-30 LAB
DLCO SINGLE BREATH LLN: 16.39
DLCO SINGLE BREATH PRE REF: 28.7 %
DLCO SINGLE BREATH REF: 22.13
DLCOC SBVA LLN: 3.1
DLCOC SBVA REF: 4.64
DLCOC SINGLE BREATH LLN: 16.39
DLCOC SINGLE BREATH REF: 22.13
DLCOCSBVAULN: 6.18
DLCOCSINGLEBREATHULN: 27.86
DLCOSINGLEBREATHULN: 27.86
DLCOVA LLN: 3.1
DLCOVA PRE REF: 39.6 %
DLCOVA PRE: 1.84 ML/(MIN*MMHG*L) (ref 3.1–6.18)
DLCOVA REF: 4.64
DLCOVAULN: 6.18
ERV LLN: -16449.21
ERV PRE REF: 170 %
ERV REF: 0.79
ERVULN: ABNORMAL
FEF 25 75 LLN: 1.12
FEF 25 75 PRE REF: 27.1 %
FEF 25 75 REF: 2.19
FEV05 LLN: 0.94
FEV05 REF: 1.79
FEV1 FVC LLN: 67
FEV1 FVC PRE REF: 70.3 %
FEV1 FVC REF: 79
FEV1 LLN: 1.81
FEV1 PRE REF: 52.6 %
FEV1 REF: 2.4
FRCPLETH LLN: 1.82
FRCPLETH PREREF: 166 %
FRCPLETH REF: 2.64
FRCPLETHULN: 3.47
FVC LLN: 2.3
FVC PRE REF: 74.4 %
FVC REF: 3.04
IVC PRE: 2.04 L (ref 2.3–3.81)
IVC SINGLE BREATH LLN: 2.3
IVC SINGLE BREATH PRE REF: 67.2 %
IVC SINGLE BREATH REF: 3.04
IVCSINGLEBREATHULN: 3.81
LLN IC: -16448.05
PEF LLN: 4.48
PEF PRE REF: 66.9 %
PEF REF: 6.14
PHYSICIAN COMMENT: ABNORMAL
PRE DLCO: 6.36 ML/(MIN*MMHG) (ref 16.39–27.86)
PRE ERV: 1.34 L (ref -16449.21–16450.79)
PRE FEF 25 75: 0.59 L/S (ref 1.12–3.64)
PRE FET 100: 6.56 SEC
PRE FEV05 REF: 49.6 %
PRE FEV1 FVC: 55.79 % (ref 67.28–89.73)
PRE FEV1: 1.26 L (ref 1.81–2.96)
PRE FEV5: 0.89 L (ref 0.94–2.65)
PRE FRC PL: 4.39 L (ref 1.82–3.47)
PRE FVC: 2.26 L (ref 2.3–3.81)
PRE IC: 0.92 L (ref -16448.05–16451.95)
PRE PEF: 4.11 L/S (ref 4.48–7.8)
PRE REF IC: 47.2 %
PRE RV: 3.05 L (ref 1.28–2.43)
PRE TLC: 5.31 L (ref 3.78–5.76)
PRE VTG: 4.55 L
RAW PRE REF: 134.1 %
RAW PRE: 4.1 CMH2O*S/L (ref 3.06–3.06)
RAW REF: 3.06
REF IC: 1.95
RV LLN: 1.28
RV PRE REF: 164.4 %
RV REF: 1.86
RVTLC LLN: 30
RVTLC PRE REF: 146 %
RVTLC PRE: 57.45 % (ref 29.77–48.95)
RVTLC REF: 39
RVTLCULN: 49
RVULN: 2.43
SGAW PRE REF: 47.7 %
SGAW PRE: 0.05 1/(CMH2O*S) (ref 0.1–0.1)
SGAW REF: 0.1
TLC LLN: 3.78
TLC PRE REF: 111.3 %
TLC REF: 4.77
TLC ULN: 5.76
ULN IC: ABNORMAL
VA PRE: 3.46 L (ref 4.62–4.62)
VA SINGLE BREATH LLN: 4.62
VA SINGLE BREATH PRE REF: 74.9 %
VA SINGLE BREATH REF: 4.62
VASINGLEBREATHULN: 4.62
VC LLN: 2.3
VC PRE REF: 74.4 %
VC PRE: 2.26 L (ref 2.3–3.81)
VC REF: 3.04
VC ULN: 3.81

## 2023-03-30 PROCEDURE — 94010 BREATHING CAPACITY TEST: ICD-10-PCS | Mod: NTX,S$GLB,, | Performed by: INTERNAL MEDICINE

## 2023-03-30 PROCEDURE — 3075F SYST BP GE 130 - 139MM HG: CPT | Mod: CPTII,NTX,S$GLB, | Performed by: INTERNAL MEDICINE

## 2023-03-30 PROCEDURE — 94726 PULM FUNCT TST PLETHYSMOGRAP: ICD-10-PCS | Mod: NTX,S$GLB,, | Performed by: INTERNAL MEDICINE

## 2023-03-30 PROCEDURE — 94618 PULMONARY STRESS TESTING: CPT | Mod: NTX,S$GLB,, | Performed by: INTERNAL MEDICINE

## 2023-03-30 PROCEDURE — 94618 PULMONARY STRESS TESTING: ICD-10-PCS | Mod: NTX,S$GLB,, | Performed by: INTERNAL MEDICINE

## 2023-03-30 PROCEDURE — 94726 PLETHYSMOGRAPHY LUNG VOLUMES: CPT | Mod: NTX,S$GLB,, | Performed by: INTERNAL MEDICINE

## 2023-03-30 PROCEDURE — 94729 DIFFUSING CAPACITY: CPT | Mod: NTX,S$GLB,, | Performed by: INTERNAL MEDICINE

## 2023-03-30 PROCEDURE — 94729 PR C02/MEMBANE DIFFUSE CAPACITY: ICD-10-PCS | Mod: NTX,S$GLB,, | Performed by: INTERNAL MEDICINE

## 2023-03-30 PROCEDURE — 3075F PR MOST RECENT SYSTOLIC BLOOD PRESS GE 130-139MM HG: ICD-10-PCS | Mod: CPTII,NTX,S$GLB, | Performed by: INTERNAL MEDICINE

## 2023-03-30 PROCEDURE — 99999 PR PBB SHADOW E&M-EST. PATIENT-LVL III: CPT | Mod: PBBFAC,TXP,, | Performed by: INTERNAL MEDICINE

## 2023-03-30 PROCEDURE — 1159F MED LIST DOCD IN RCRD: CPT | Mod: CPTII,NTX,S$GLB, | Performed by: INTERNAL MEDICINE

## 2023-03-30 PROCEDURE — 3008F PR BODY MASS INDEX (BMI) DOCUMENTED: ICD-10-PCS | Mod: CPTII,NTX,S$GLB, | Performed by: INTERNAL MEDICINE

## 2023-03-30 PROCEDURE — 99214 PR OFFICE/OUTPT VISIT, EST, LEVL IV, 30-39 MIN: ICD-10-PCS | Mod: NTX,S$GLB,, | Performed by: INTERNAL MEDICINE

## 2023-03-30 PROCEDURE — 3078F PR MOST RECENT DIASTOLIC BLOOD PRESSURE < 80 MM HG: ICD-10-PCS | Mod: CPTII,NTX,S$GLB, | Performed by: INTERNAL MEDICINE

## 2023-03-30 PROCEDURE — 1160F PR REVIEW ALL MEDS BY PRESCRIBER/CLIN PHARMACIST DOCUMENTED: ICD-10-PCS | Mod: CPTII,NTX,S$GLB, | Performed by: INTERNAL MEDICINE

## 2023-03-30 PROCEDURE — 3008F BODY MASS INDEX DOCD: CPT | Mod: CPTII,NTX,S$GLB, | Performed by: INTERNAL MEDICINE

## 2023-03-30 PROCEDURE — 1159F PR MEDICATION LIST DOCUMENTED IN MEDICAL RECORD: ICD-10-PCS | Mod: CPTII,NTX,S$GLB, | Performed by: INTERNAL MEDICINE

## 2023-03-30 PROCEDURE — 99999 PR PBB SHADOW E&M-EST. PATIENT-LVL III: ICD-10-PCS | Mod: PBBFAC,TXP,, | Performed by: INTERNAL MEDICINE

## 2023-03-30 PROCEDURE — 94010 BREATHING CAPACITY TEST: CPT | Mod: NTX,S$GLB,, | Performed by: INTERNAL MEDICINE

## 2023-03-30 PROCEDURE — 3078F DIAST BP <80 MM HG: CPT | Mod: CPTII,NTX,S$GLB, | Performed by: INTERNAL MEDICINE

## 2023-03-30 PROCEDURE — 1160F RVW MEDS BY RX/DR IN RCRD: CPT | Mod: CPTII,NTX,S$GLB, | Performed by: INTERNAL MEDICINE

## 2023-03-30 PROCEDURE — 99214 OFFICE O/P EST MOD 30 MIN: CPT | Mod: NTX,S$GLB,, | Performed by: INTERNAL MEDICINE

## 2023-03-30 RX ORDER — FAMOTIDINE 40 MG/1
40 TABLET, FILM COATED ORAL NIGHTLY
COMMUNITY
Start: 2023-03-15

## 2023-03-30 RX ORDER — NINTEDANIB 150 MG/1
150 CAPSULE ORAL 2 TIMES DAILY
COMMUNITY
Start: 2023-03-22

## 2023-03-30 NOTE — PROGRESS NOTES
"LUNG TRANSPLANT PRE FOLLOW-UP    Referring Physician: Denis Rai    Reason for Visit:  Pre-lung transplant follow-up.         Date of Initial Evaluation:                                                                                              History of Present Illness: Antonella Chase is a 62 y.o. female who is on 2L of oxygen. She is on no assisted ventilation.  Her New York Heart Association Class is III and a Karnofsky score of 70% - Cares for self: Unable to carry on normal activity or active work. She is not diabetic.     Patient returns for follow up visit.  Since her last visit, she has been relatively stable.  She does not recognize any significant dyspnea above baseline.  She does endorse difficulty with climbing stairs and walking inclines.  Takes inhaler therapy as prescribed and takes OFEV for IPF.  Overall, remains stable.      Review of Systems   Constitutional:  Negative for chills, diaphoresis, fever, malaise/fatigue and weight loss.   HENT:  Negative for congestion, nosebleeds and sinus pain.    Eyes:  Negative for blurred vision.   Respiratory:  Positive for cough (chronic, dry) and shortness of breath (with exertion, at baseline). Negative for hemoptysis, sputum production and wheezing.    Cardiovascular:  Negative for chest pain, palpitations, orthopnea, claudication and leg swelling.   Gastrointestinal:  Negative for abdominal pain, constipation, diarrhea, heartburn, nausea and vomiting.   Genitourinary:  Negative for dysuria, frequency and urgency.   Musculoskeletal:  Negative for myalgias.   Skin:  Negative for itching and rash.   Neurological:  Negative for dizziness, tremors and headaches.   Psychiatric/Behavioral:  Negative for substance abuse. The patient is not nervous/anxious.    Objective:   /64 (BP Location: Right arm, Patient Position: Sitting, BP Method: Medium (Automatic))   Pulse 76   Resp 18   Ht 5' 3" (1.6 m)   Wt 62.9 kg (138 lb 10.7 oz)   SpO2 (!) 94% " Comment: room air  BMI 24.56 kg/m²   Physical Exam  Constitutional:       Appearance: She is well-developed.   HENT:      Head: Normocephalic and atraumatic.   Eyes:      Conjunctiva/sclera: Conjunctivae normal.   Cardiovascular:      Rate and Rhythm: Normal rate and regular rhythm.      Heart sounds: Normal heart sounds.   Pulmonary:      Effort: Pulmonary effort is normal.      Breath sounds: Rales (trace) present.      Comments: Mildly diminished at the bases  Abdominal:      General: Bowel sounds are normal.      Palpations: Abdomen is soft.   Musculoskeletal:         General: Normal range of motion.      Cervical back: Normal range of motion and neck supple.   Skin:     General: Skin is warm and dry.   Neurological:      Mental Status: She is alert and oriented to person, place, and time.     Labs:  Hospital Outpatient Visit on 03/30/2023   Component Date Value    Pre FVC 03/30/2023 2.26 (L)     PRE FEV5 03/30/2023 0.89 (L)     Pre FEV1 03/30/2023 1.26 (L)     Pre FEV1 FVC 03/30/2023 55.79 (L)     Pre FEF 25 75 03/30/2023 0.59 (L)     Pre PEF 03/30/2023 4.11 (L)     Pre  03/30/2023 6.56     Pre DLCO 03/30/2023 6.36 (L)     DLCOVA PRE 03/30/2023 1.84 (L)     VA PRE 03/30/2023 3.46 (L)     IVC PRE 03/30/2023 2.04 (L)     Pre TLC 03/30/2023 5.31     VC PRE 03/30/2023 2.26 (L)     PRE IC 03/30/2023 0.92     Pre FRC PL 03/30/2023 4.39 (H)     Pre ERV 03/30/2023 1.34     Pre RV 03/30/2023 3.05 (H)     RVTLC PRE 03/30/2023 57.45 (H)     Raw PRE 03/30/2023 4.10 (H)     sGaw PRE 03/30/2023 0.05 (L)     Pre VTG 03/30/2023 4.55     FVC Ref 03/30/2023 3.04     FVC LLN 03/30/2023 2.30     FVC Pre Ref 03/30/2023 74.4     FEV05 REF 03/30/2023 1.79     FEV05 LLN 03/30/2023 0.94     PRE FEV05 REF 03/30/2023 49.6     FEV1 Ref 03/30/2023 2.40     FEV1 LLN 03/30/2023 1.81     FEV1 Pre Ref 03/30/2023 52.6     FEV1 FVC Ref 03/30/2023 79     FEV1 FVC LLN 03/30/2023 67     FEV1 FVC Pre Ref 03/30/2023 70.3     FEF 25 75 Ref  03/30/2023 2.19     FEF 25 75 LLN 03/30/2023 1.12     FEF 25 75 Pre Ref 03/30/2023 27.1     PEF Ref 03/30/2023 6.14     PEF LLN 03/30/2023 4.48     PEF Pre Ref 03/30/2023 66.9     TLC Ref 03/30/2023 4.77     TLC LLN 03/30/2023 3.78     TLC ULN 03/30/2023 5.76     TLC Pre Ref 03/30/2023 111.3     VC Ref 03/30/2023 3.04     VC LLN 03/30/2023 2.30     VC ULN 03/30/2023 3.81     VC Pre Ref 03/30/2023 74.4     REF IC 03/30/2023 1.95     LLN IC 03/30/2023 -62731.05     ULN IC 03/30/2023 20547.95     PRE REF IC 03/30/2023 47.2     FRCpleth Ref 03/30/2023 2.64     FRCpleth LLN 03/30/2023 1.82     FRC PLETH ULN 03/30/2023 3.47     FRCpleth PreRef 03/30/2023 166.0     ERV Ref 03/30/2023 0.79     ERV LLN 03/30/2023 -70805.21     ERV ULN 03/30/2023 74213.79     ERV Pre Ref 03/30/2023 170.0     RV Ref 03/30/2023 1.86     RV LLN 03/30/2023 1.28     RV ULN 03/30/2023 2.43     RV Pre Ref 03/30/2023 164.4     RVTLC Ref 03/30/2023 39     RVTLC LLN 03/30/2023 30     RV TLC ULN 03/30/2023 49     RVTLC Pre Ref 03/30/2023 146.0     Raw Ref 03/30/2023 3.06     Raw Pre Ref 03/30/2023 134.1     sGaw Ref 03/30/2023 0.10     sGaw Pre Ref 03/30/2023 47.7     DLCO Single Breath Ref 03/30/2023 22.13     DLCO Single Breath LLN 03/30/2023 16.39     DLCO SINGLEBREATH ULN 03/30/2023 27.86     DLCO Single Breath Pre R* 03/30/2023 28.7     DLCOc Single Breath Ref 03/30/2023 22.13     DLCOc Single Breath LLN 03/30/2023 16.39     DLCOC SINGLEBREATH ULN 03/30/2023 27.86     DLCOVA Ref 03/30/2023 4.64     DLCOVA LLN 03/30/2023 3.10     DLCOVA ULN 03/30/2023 6.18     DLCOVA Pre Ref 03/30/2023 39.6     DLCOc SBVA Ref 03/30/2023 4.64     DLCOc SBVA LLN 03/30/2023 3.10     DLCOCSBVA ULN 03/30/2023 6.18     VA Single Breath Ref 03/30/2023 4.62     VA Single Breath LLN 03/30/2023 4.62     VA SINGLEBREATH ULN 03/30/2023 4.62     VA Single Breath Pre Ref 03/30/2023 74.9     IVC Single Breath Ref 03/30/2023 3.04     IVC Single Breath LLN 03/30/2023 2.30     IVC  SINGLEBREATH ULN 03/30/2023 3.81     IVC Single Breath Pre Ref 03/30/2023 67.2        Pulmonary Function Tests 3/30/2023 11/18/2021 10/20/2020 5/4/2020   FVC 2.26 2.56 2.31 2.5   FEV1 1.26 1.39 1.17 1.32   TLC (liters) 5.31 4.52 4.36 5.41   DLCO (ml/mmHg sec) 6.36 8.9 11.7 9.8   FVC% 74.4 83 74 76   FEV1% 52.6 57 47 51   FEF 25-75 0.59 0.61 - -   FEF 25-75% 27.1 27 - -   TLC% 111.3 95 91 106   RV 3.05 1.74 2.03 2.85   RV% 164.4 95 111 147   DLCO% 28.7 40 52 40     6MW 3/30/2023 12/29/2022 11/18/2021 2/4/2021   6MWT Status completed without stopping completed without stopping completed without stopping completed without stopping   Patient Reported Dyspnea Chest pain;Dyspnea;Leg pain;Lightheadedness Chest pain;Dyspnea Dyspnea;Leg pain;Lightheadedness   Was O2 used? Yes Yes No No   Delivery Method Cannula;Pull Tank;Continuous Flow Cannula;Pull Tank;Continuous Flow - -   6MW Distance walked (feet) 1200 1300 1500 1300   Distance walked (meters) 365.76 396.24 457.2 396.24   Did patient stop? No No No No   Oxygen Saturation 98 97 96 94   Supplemental Oxygen 2 L/M 2 L/M Room Air Room Air   Heart Rate 94 80 70 90   Blood Pressure 143/61 160/67 111/56 133/69   Stephanie Dyspnea Rating  nothing at all nothing at all light very light   Oxygen Saturation 90 91 88 89   Supplemental Oxygen 4 L/M 2 L/M Room Air Room Air   Heart Rate 100 81 91 105   Blood Pressure 152/73 135/61 140/65 143/70   Stephanie Dyspnea Rating  somewhat heavy heavy heavy moderate   Recovery Time (seconds) 66 128 162 91   Oxygen Saturation 98 98 97 94   Supplemental Oxygen 4 L/M 2 L/M Room Air Room Air   Heart Rate 89 84 73 -       6MWT OS  10/02/2020  Starting SPO2 98%, HR 73, on RA  Lowest SPO2 93%, HR 85 at completion, on RA  Distance: 305 meters    Cardiodiagnostics:  TTE OSH  10/02/20  -LVEF 40-45%, grade I diastolic dysfunction    Assessment:-  1. IPF (idiopathic pulmonary fibrosis)    2. Pulmonary emphysema with fibrosis of lung    3. Chronic respiratory  failure with hypoxia      Plan:   Pt followed for combined COPD and IPF.  FVC and DLCO have declined from previous.  Has offsetting obstructive and restrictive processes.  CT chest without significant progression.  Continue with OFEV.  GERD and nasal symptoms controlled.  Continue with inhaler regimen as prescribed.  No recent exacerbations or hospitalizations.  Continue with supplemental oxygen at 2L as prescribed.  Had a RHC last year without evidence of PH.  Repeat TTE without evidence of PH.    With regards to her lung transplant candidacy for a diagnosis of combined COPD/IPF, she is likely still early for transplant considerations.  CT chest and TTE stable.  Follow-up in 3 months.      Syeda Arango D.O.  Pulmonary/Critical Care and Lung Transplantation  [facility]

## 2023-03-30 NOTE — PROCEDURES
Antonella Chsae is a 62 y.o.  female patient, who presents for a 6 minute walk test ordered by DO Conchita.  The diagnosis is Pulmonary Fibrosis.  The patient's BMI is 24.6 kg/m2.  Predicted distance (lower limit of normal) is 374.7 meters.      Test Results:    The test was completed without stopping.  The total time walked was 360 seconds.  During walking, the patient reported:  Dyspnea.  The patient used supplemental oxygen during testing.     03/30/2023---------Distance: 365.76 meters (1200 feet)     Time O2 Sat % Supplemental Oxygen Heart Rate Blood Pressure Stephanie Scale Comment   Pre-exercise  (Resting) 0 98 % 2 L/M 94 bpm 143/61 mmHg 0    During Exercise 1 min 93 % 2 L/M 95 bpm      During Exercise 2 min 89 % 2 L/M 100 bpm      During Exercise 3 min 86 % 2 L/M 103 bpm   Oxygen Increased   During Exercise 4 min 87 % 3 L/M 104 bpm   Oxygen Increased   During Exercise 5 min 90 % 4 L/M 103 bpm      During Exercise 6 min 90 % 4 L/M 100 bpm 152/73 mmHg 4    Post-exercise  (Recovery)  98 % 4 L/M  89 bpm        Recovery Time: 66 seconds    Performing nurse/tech: GABE Phillip      PREVIOUS STUDY:   12/29/2022---------Distance: 396.24 meters (1300 feet)       Time O2 Sat % Supplemental Oxygen Heart Rate Blood Pressure Stephanie Scale Comment   Pre-exercise  (Resting) 0 88 % Room Air 95 bpm 153/71 mmHg 3     Pre-exercise  (Resting) 0 97 % 2 L/M 80 bpm 160/67 mmHg 0     During Exercise 1 min 92 % 2 L/M 95 bpm         During Exercise 2 min 86 % 2 L/M 97 bpm     Oxygen Increased   During Exercise 3 min 91 % 3 L/M 95 bpm         During Exercise 4 min 92 % 3 L/M 101 bpm         During Exercise 5 min 90 % 3 L/M 98 bpm         During Exercise 6 min 91 % 3 L/M 81 bpm 135/61 mmHg 5-6     Post-exercise      98 % 3 L/M  84 bpm 140/77 mmHg           CLINICAL INTERPRETATION:  Six minute walk distance is 365.76 meters (1200 feet) with somewhat heavy dyspnea.  During exercise, there was significant desaturation while breathing  supplemental oxygen.  Both blood pressure and heart rate remained stable with walking.  The patient did not report non-pulmonary symptoms during exercise.  Since the previous study in December 2022, exercise capacity is unchanged.  Based upon age and body mass index, exercise capacity is less than predicted.

## 2023-04-04 ENCOUNTER — TELEPHONE (OUTPATIENT)
Dept: TRANSPLANT | Facility: CLINIC | Age: 63
End: 2023-04-04
Payer: MEDICARE

## 2023-04-04 DIAGNOSIS — J84.10 PULMONARY EMPHYSEMA WITH FIBROSIS OF LUNG: Primary | ICD-10-CM

## 2023-04-04 DIAGNOSIS — J43.9 PULMONARY EMPHYSEMA WITH FIBROSIS OF LUNG: Primary | ICD-10-CM

## 2023-04-04 DIAGNOSIS — Z76.82 LUNG TRANSPLANT CANDIDATE: ICD-10-CM

## 2023-04-04 NOTE — TELEPHONE ENCOUNTER
----- Message from Estee Sanches sent at 4/4/2023 10:47 AM CDT -----  Regarding: Orders  Contact: Robles from [facility]  Robles from [facility] is calling in regards to pt orders for Portable Oxygen. Rep is requesting a callback. Please adv      Confirmed contact below:   Contact Name:Robles   Phone Number:     Received a call from Robles with [employer].  She stated that patient is requesting a portable oxygen concentrator.  She stated that they need to provide so many oxygen tanks for so many months before a portable oxygen concentrator can be obtained.  She stated that they need a letter stating why a portable oxygen concentrator is necessary.  She requested that we fax the letter to [phone].    Discussed with Dr. Arango.  Instructions received to send the letter for POC.    4/5/23 - Letter sent to [employer] for POC.

## 2023-04-04 NOTE — LETTER
April 5, 2023      To Whom It May Concern:    Patient: Antonella Chase   MR Number: [id number]   YOB: 1960     We have had the opportunity of evaluating and caring for Antonella Chase in our clinic. Please review the below regarding this patient and a request for additional, medically necessary equipment:    {Patient} has been diagnosed with chronic respiratory failure with hypoxia due to {diagnosis}. {He} {She} has experienced a significant decline in health during the past several months. {He} {She} currently requires continuous oxygen at {#} via nasal cannula to maintain adequate oxygenation.    Given {his} {her deteriorating condition, {patient} would benefit from receiving adequate portable oxygen supplies, in addition to {his} {her} current oxygen supplies, as {his} {her} current mobility and deconditioned status make it hard for {him} {her} to rehabilitate or maintain a quality of life with only the current tank oxygen and home concentrator equipment. A portable oxygen concentrator, in addition to {his} {her} current tanks and home concentrator would provide this patient the opportunity to function as independently as possible, in and outside of his home. The current oxygen supplies alone are not as effective for {him} {her} as the addition of a portable oxygen concentrator would be. Given this patient's advanced age and recent health deterioration, {he} {she} wishes to maintain his overall quality of life, as much as possible. Our advanced lung disease team finds it medically necessary that the patient be provided with additional oxygen equipement as soon as possible.    We appreciate your assistance in requesting additional resources for this patient's well being.  Please feel free to contact us if you have any further questions or concerns.       Sincerely,       Syeda Arango D.O.  Medical Director, Lung Transplant  Pulmonary & Critical Care Medicine    [facility]  [address] [address]  [phone]

## 2023-04-04 NOTE — LETTER
April 5, 2023      To Whom It May Concern:      Patient: Antonella Chase   MR Number: [id number]   YOB: 1960       We have had the opportunity of evaluating and caring for Antonella Chase in our clinic. Please review the below regarding this patient and a request for additional, medically necessary equipment:    Antonella Chase has been diagnosed with chronic respiratory failure with hypoxia due to combined emphysema and pulmonary fibrosis. She has experienced a recent decline in health and lung function during the past several months. She currently requires continuous oxygen at 2 liters via nasal cannula at rest and 4 liters with exertion to maintain adequate oxygenation.      Given her deteriorating condition and her inability to carry or bear the weight of the portable oxygen tanks, Antonella Chase would benefit from receiving adequate portable oxygen supplies, including a portable oxygen concentrator. Her current mobility and deconditioned status make it hard for her to rehabilitate or maintain a quality of life with only the current tank oxygen and home concentrator equipment. A portable oxygen concentrator, in addition to her current home concentrator would provide this patient the opportunity to function as independently as possible, in and outside of her home. The current oxygen supplies alone are not as effective for her as the addition of a portable oxygen concentrator would be. Given this patient's advanced age and recent health deterioration, she wishes to maintain her overall quality of life, as much as possible. Our lung transplant team finds it medically necessary that the patient be provided with additional oxygen equipment as soon as possible.    We appreciate your assistance in requesting additional resources for this patient's well being.  Please feel free to contact us at [phone] if you have any further questions or concerns.       Sincerely,       Syeda Arango D.O.  Medical  Director, Lung Transplant  Pulmonary & Critical Care Medicine    [facility]  [address] [place] [id number]  [phone]

## 2023-04-04 NOTE — LETTER
April 5, 2023      To Whom It May Concern:      Patient: Antonella Chase   MR Number: [id number]   YOB: 1960       We have had the opportunity of evaluating and caring for Antonella Chase in our clinic.  Please review the below regarding this patient and a request for additional, medically necessary equipment:    Antonella Chase has been diagnosed with chronic respiratory failure with hypoxia due to combined pulmonary fibrosis and emphysema.  She has experienced a recent decline in health and lung function during the past several months.  She currently requires continuous oxygen at 2 liters via nasal cannula at rest and 4 liters with exertion to maintain adequate oxygenation.      Given her deteriorating condition and her inability to carry or bear the weight of the portable oxygen tanks, Antonella Chase would benefit from receiving adequate portable oxygen supplies, including a portable oxygen concentrator.  Her current limitations make it difficult for her to rehabilitate or maintain a good quality of life with only the current tank oxygen and home concentrator equipment.  A portable oxygen concentrator, in addition to her current home concentrator would provide this patient the opportunity to function as independently as possible, in and outside of her home.  The current oxygen tanks are too heavy and cumbersome for her and are not as effective for her as the addition of a portable oxygen concentrator would be.  Given this patient's age and recent health deterioration, she wishes to maintain her overall quality of life as much as possible.  Our lung transplant team finds it medically necessary that Antonella Chase be provided with additional oxygen equipment as soon as possible.    We appreciate your assistance in requesting additional resources for our patient's well being.  Please feel free to contact us at [phone].    Sincerely,       Syeda Arango D.O.  Medical Director, Lung  Transplant  Pulmonary & Critical Care Medicine    [facility]  [facility] [place] [address]  [phone]

## 2023-04-10 ENCOUNTER — PATIENT MESSAGE (OUTPATIENT)
Dept: TRANSPLANT | Facility: CLINIC | Age: 63
End: 2023-04-10
Payer: MEDICARE

## 2023-04-10 ENCOUNTER — TELEPHONE (OUTPATIENT)
Dept: TRANSPLANT | Facility: CLINIC | Age: 63
End: 2023-04-10
Payer: MEDICARE

## 2023-04-10 DIAGNOSIS — J43.9 PULMONARY EMPHYSEMA WITH FIBROSIS OF LUNG: Primary | ICD-10-CM

## 2023-04-10 DIAGNOSIS — J84.10 PULMONARY EMPHYSEMA WITH FIBROSIS OF LUNG: Primary | ICD-10-CM

## 2023-04-10 DIAGNOSIS — Z76.82 LUNG TRANSPLANT CANDIDATE: ICD-10-CM

## 2023-04-10 NOTE — TELEPHONE ENCOUNTER
----- Message from Padmini Fofana sent at 4/10/2023 11:06 AM CDT -----  Regarding: Speak to staff   Pt is Calling to speak to staff in regards to info for new company for Portable Oxygen .   Need to have the following items faxed over   Current Rx,  Pt Demo, and Qualifying Chart Notes.   Please advise. Pt is asking for a follow up call.    [phone] (home)       [facility]    [phone]  ( FAX)    Contacted patient.  Informed her a letter of medical necessity for the portable oxygen [facility].  She stated that it was received, but the request has to be reviewed.  She stated that [facility] and would like to change oxygen companies.  She asked the we send the above information to [facility] via fax.  Informed patient that the recent 6 minute walk test was not for oxygen qualification.  Informed her that the testing was done with oxygen.  Informed her that she may need to repeat the 6 minute walk test.  She verbalized her understanding.  She stated that she will contact [facility] to make them aware.    Message routed to DAMIAN Thibodeaux LMSW.

## 2023-04-11 ENCOUNTER — TELEPHONE (OUTPATIENT)
Dept: TRANSPLANT | Facility: CLINIC | Age: 63
End: 2023-04-11
Payer: MEDICARE

## 2023-04-11 NOTE — TELEPHONE ENCOUNTER
RUCHI faxed medical records and oxygen orders to [employer] (fx: [phone]). RUCHI spoke to pt via telephone to confirm plans to switch DME companies. Pt verbalized agreement. RUCHI following and remains available.     Addendum 4/12/2023 - RUCHI contacted [facility] (ph: [phone]) and confirmed w/ Tayrn that orders were received. RUCHI remains available.       ----- Message from Rebecca Hernandez RN sent at 4/10/2023  3:21 PM CDT -----  Patient is asking to change her oxygen provider.  Please see attached message for details.  Shantel entered an order for the oxygen.  I explained to the patient that the 6 minute walk test was done on oxygen and may need to be repeated.      Thanks,  sl

## 2023-05-03 ENCOUNTER — TELEPHONE (OUTPATIENT)
Dept: TRANSPLANT | Facility: CLINIC | Age: 63
End: 2023-05-03
Payer: MEDICARE

## 2023-05-03 ENCOUNTER — PATIENT MESSAGE (OUTPATIENT)
Dept: TRANSPLANT | Facility: CLINIC | Age: 63
End: 2023-05-03
Payer: MEDICARE

## 2023-05-03 NOTE — TELEPHONE ENCOUNTER
"Attempted to contact patient per patient's request via [facility].  No answer.  Left a message requesting a return call.    Contacted patient.  She stated that she had a set back.  She stated that she developed an E. Coli infections a few weeks ago.  She stated that she went to the ER because she was feeling horrible.  She had nausea, vomiting, bloody diarrhea, and chills.  She stated that every time she vomited, her oxygen saturation would decrease.  She stated that she had an elevated WBC (20) and a CT which showed infectious colitis.  She stated that she was discharged home despite continually having the same symptoms and without any medications.  She stated that her sister decided to stay with her that night because of her symptoms.  She stated that during the night her oxygen level decreased to 67%.  She stated that her sister called EMS.  She stated that the paramedics "stabilized" her, but she refused to go back to the ER since she had just left.  She stated that the following Monday, she saw in her portal a new test result which showed that she had E. Coli.  She contacted her PCP and went to his office.  She stated that her PCP prescribed Cipro.  She stated that she since the infection she has been having a hard time breathing and "getting air in".  She stated that she has been using her Albuterol and ipratropium nebs.  She stated that she has been wheezing and her oxygen levels have been decreasing.  She is complaining of a non productive cough and chest tightness.  She denies any congestion or fever.  She stated that she walked from her bedroom to the kitchen today and her oxygen saturation decreased to 80% on room air.  Instructed patient to wear her oxygen.  Also informed her that I would notify Dr. Arango of her complaints.  Instructed her to contact her local pulmonologist to see if she can be seen.  Patient also stated that her sister told her that she should see Palliative Care.  She stated that " she contacted [facility] today and they said that she could see Palliative Care.  Informed patient that a Palliative Care Consult would be appropriate.   Informed patient that she could discuss with her local MD or she could discuss it with Dr. Arango at her next visit.  She verbalized her understanding of all discussed.  She stated that she will contact Dr. Rai's office to schedule an appointment for tomorrow or Friday.      5/4/23 - Received the below message from Dr. Arango:    Syeda Arango, DO Rebecca Hernandez RN  She can come back to clinic asap for an assessment.  Will need PFTs, 6MWT, and CT chest.  Thanks     Contacted patient and notified her of Dr. Arango's instructions.  She stated that she has an appointment scheduled with Dr. Rai today.  Instructed her to inform Dr. Rai of Dr. Arango's instructions and to call me tomorrow or send a message via [facility] with Dr. Rai's treatment plan.  She verbalized her understanding.

## 2023-05-04 ENCOUNTER — PATIENT MESSAGE (OUTPATIENT)
Dept: TRANSPLANT | Facility: CLINIC | Age: 63
End: 2023-05-04
Payer: MEDICARE

## 2023-05-05 ENCOUNTER — TELEPHONE (OUTPATIENT)
Dept: TRANSPLANT | Facility: CLINIC | Age: 63
End: 2023-05-05
Payer: MEDICARE

## 2023-05-05 DIAGNOSIS — J43.9 PULMONARY EMPHYSEMA WITH FIBROSIS OF LUNG: ICD-10-CM

## 2023-05-05 DIAGNOSIS — J84.9 INTERSTITIAL PULMONARY DISEASE, UNSPECIFIED: Primary | ICD-10-CM

## 2023-05-05 DIAGNOSIS — J84.10 PULMONARY EMPHYSEMA WITH FIBROSIS OF LUNG: ICD-10-CM

## 2023-05-05 DIAGNOSIS — R06.02 INCREASING SHORTNESS OF BREATH: ICD-10-CM

## 2023-05-05 DIAGNOSIS — Z76.82 LUNG TRANSPLANT CANDIDATE: ICD-10-CM

## 2023-05-05 NOTE — PROGRESS NOTES
Received the following message from Dr. Arango today:    With IPF she almost certainly progressed as a part of the illness.  Unfortunately, IPF exacerbations can cause worsening damage to the lungs.  I agree with the treatment Dr. Rai has her on but I would prefer to see her here ASAP.  She's been on the edge of needing transplant and if that's something she wants to consider then we need to see her quickly.  Will need PFTs, 6MWT, echo, and CT chest (if CT and TTE were done in [place] then needs to bring a CD and records).  Thanks      Message forwarded to patient.  Patient requested to schedule the appointment the early week of 5/15/23, so her daughters can accompany her to the appointment.

## 2023-05-07 ENCOUNTER — NURSE TRIAGE (OUTPATIENT)
Dept: ADMINISTRATIVE | Facility: CLINIC | Age: 63
End: 2023-05-07
Payer: MEDICARE

## 2023-05-07 NOTE — TELEPHONE ENCOUNTER
Daughter Bhargavi Collins calling to clarify the timeframe that pt is to be nicotine free before received a lung transplant. Chart reviewed and unable to locate information requested. Advised to reach out to provider when open per protocol. Verbalized understanding. Encounter routed to provider.        Reason for Disposition   [1] Caller requesting NON-URGENT health information AND [2] PCP's office is the best resource    Protocols used: Information Only Call - No Triage-A-

## 2023-05-08 ENCOUNTER — PATIENT MESSAGE (OUTPATIENT)
Dept: TRANSPLANT | Facility: CLINIC | Age: 63
End: 2023-05-08
Payer: MEDICARE

## 2023-05-08 NOTE — TELEPHONE ENCOUNTER
Attempted to contact Dennis Glasgow, patient's daughter.  No answer.  Left a message informing her that a patient must be abstinent from all nicotine use for 6 months prior to lung transplantation.  Instructed her to contact me for any questions or concerns.

## 2023-05-09 ENCOUNTER — TELEPHONE (OUTPATIENT)
Dept: TRANSPLANT | Facility: CLINIC | Age: 63
End: 2023-05-09
Payer: MEDICARE

## 2023-05-09 NOTE — TELEPHONE ENCOUNTER
----- Message from Estee Sanches sent at 5/9/2023  9:36 AM CDT -----  Regarding: Medical Advice  Contact: Pt daughter  Pt is calling back from a missed call.   Pt. Would like a call back.    Pt daughter is requesting a callback in regards to medical questions.       Confirmed contact below:   Contact Name:Pt daughter Dennis Glasgow   Phone Number: [phone]    Contacted Dennis.  She inquired as to the policy for nicotine use.  Inquired if the patient has been smoking or using any nicotine products.  Dennis stated that patient is not using any nicotine products.  Notified her of our policy for 6 months abstinence from any nicotine use.  She verbalized her complete understanding and denied having any further questions.

## 2023-05-11 ENCOUNTER — DOCUMENTATION ONLY (OUTPATIENT)
Dept: TRANSPLANT | Facility: CLINIC | Age: 63
End: 2023-05-11
Payer: MEDICARE

## 2023-05-15 ENCOUNTER — OFFICE VISIT (OUTPATIENT)
Dept: TRANSPLANT | Facility: CLINIC | Age: 63
End: 2023-05-15
Payer: MEDICARE

## 2023-05-15 ENCOUNTER — HOSPITAL ENCOUNTER (OUTPATIENT)
Dept: PULMONOLOGY | Facility: CLINIC | Age: 63
Discharge: HOME | End: 2023-05-15
Payer: MEDICARE

## 2023-05-15 ENCOUNTER — HOSPITAL ENCOUNTER (OUTPATIENT)
Dept: CARDIOLOGY | Facility: HOSPITAL | Age: 63
Discharge: HOME | End: 2023-05-15
Attending: INTERNAL MEDICINE
Payer: MEDICARE

## 2023-05-15 ENCOUNTER — HOSPITAL ENCOUNTER (OUTPATIENT)
Dept: RADIOLOGY | Facility: HOSPITAL | Age: 63
Discharge: HOME | End: 2023-05-15
Attending: INTERNAL MEDICINE
Payer: MEDICARE

## 2023-05-15 VITALS — BODY MASS INDEX: 24.76 KG/M2 | WEIGHT: 139.75 LBS | HEIGHT: 63 IN

## 2023-05-15 VITALS
SYSTOLIC BLOOD PRESSURE: 158 MMHG | DIASTOLIC BLOOD PRESSURE: 72 MMHG | HEART RATE: 81 BPM | HEIGHT: 63 IN | RESPIRATION RATE: 20 BRPM | WEIGHT: 139.75 LBS | BODY MASS INDEX: 24.76 KG/M2 | OXYGEN SATURATION: 96 %

## 2023-05-15 VITALS
DIASTOLIC BLOOD PRESSURE: 70 MMHG | HEART RATE: 64 BPM | SYSTOLIC BLOOD PRESSURE: 132 MMHG | BODY MASS INDEX: 24.45 KG/M2 | HEIGHT: 63 IN | WEIGHT: 138 LBS

## 2023-05-15 DIAGNOSIS — J43.9 PULMONARY EMPHYSEMA WITH FIBROSIS OF LUNG: ICD-10-CM

## 2023-05-15 DIAGNOSIS — R06.02 INCREASING SHORTNESS OF BREATH: ICD-10-CM

## 2023-05-15 DIAGNOSIS — J96.11 CHRONIC RESPIRATORY FAILURE WITH HYPOXIA: ICD-10-CM

## 2023-05-15 DIAGNOSIS — J84.10 PULMONARY EMPHYSEMA WITH FIBROSIS OF LUNG: ICD-10-CM

## 2023-05-15 DIAGNOSIS — Z76.82 LUNG TRANSPLANT CANDIDATE: ICD-10-CM

## 2023-05-15 DIAGNOSIS — J84.9 INTERSTITIAL PULMONARY DISEASE, UNSPECIFIED: Primary | ICD-10-CM

## 2023-05-15 DIAGNOSIS — J84.9 INTERSTITIAL PULMONARY DISEASE, UNSPECIFIED: ICD-10-CM

## 2023-05-15 LAB
AMPHET+METHAMPHET UR QL: NEGATIVE
ASCENDING AORTA: 2.85 CM
AV INDEX (PROSTH): 0.75
AV MEAN GRADIENT: 3 MMHG
AV PEAK GRADIENT: 5 MMHG
AV VALVE AREA: 2.43 CM2
AV VELOCITY RATIO: 0.79
BARBITURATES UR QL SCN>200 NG/ML: NEGATIVE
BENZODIAZ UR QL SCN>200 NG/ML: NEGATIVE
BSA FOR ECHO PROCEDURE: 1.67 M2
BZE UR QL SCN: NEGATIVE
CANNABINOIDS UR QL SCN: NEGATIVE
CREAT UR-MCNC: 36 MG/DL (ref 15–325)
CV ECHO LV RWT: 0.26 CM
DOP CALC AO PEAK VEL: 1.07 M/S
DOP CALC AO VTI: 21.52 CM
DOP CALC LVOT AREA: 3.3 CM2
DOP CALC LVOT DIAMETER: 2.04 CM
DOP CALC LVOT PEAK VEL: 0.84 M/S
DOP CALC LVOT STROKE VOLUME: 52.4 CM3
DOP CALCLVOT PEAK VEL VTI: 16.04 CM
E WAVE DECELERATION TIME: 284.33 MSEC
E/A RATIO: 0.76
E/E' RATIO: 8.35 M/S
ECHO LV POSTERIOR WALL: 0.62 CM (ref 0.6–1.1)
EJECTION FRACTION: 65 %
ETHANOL UR-MCNC: <10 MG/DL
FRACTIONAL SHORTENING: 39 % (ref 28–44)
INTERVENTRICULAR SEPTUM: 0.65 CM (ref 0.6–1.1)
LA MAJOR: 4.16 CM
LA MINOR: 3.75 CM
LA WIDTH: 3.47 CM
LEFT ATRIUM SIZE: 3.19 CM
LEFT ATRIUM VOLUME INDEX MOD: 20.9 ML/M2
LEFT ATRIUM VOLUME INDEX: 22.5 ML/M2
LEFT ATRIUM VOLUME MOD: 34.51 CM3
LEFT ATRIUM VOLUME: 37.11 CM3
LEFT INTERNAL DIMENSION IN SYSTOLE: 2.97 CM (ref 2.1–4)
LEFT VENTRICLE DIASTOLIC VOLUME INDEX: 67.06 ML/M2
LEFT VENTRICLE DIASTOLIC VOLUME: 110.65 ML
LEFT VENTRICLE MASS INDEX: 59 G/M2
LEFT VENTRICLE SYSTOLIC VOLUME INDEX: 20.8 ML/M2
LEFT VENTRICLE SYSTOLIC VOLUME: 34.28 ML
LEFT VENTRICULAR INTERNAL DIMENSION IN DIASTOLE: 4.86 CM (ref 3.5–6)
LEFT VENTRICULAR MASS: 96.74 G
LV LATERAL E/E' RATIO: 7.89 M/S
LV SEPTAL E/E' RATIO: 8.88 M/S
METHADONE UR QL SCN>300 NG/ML: NEGATIVE
MV A" WAVE DURATION": 9.71 MSEC
MV PEAK A VEL: 0.94 M/S
MV PEAK E VEL: 0.71 M/S
MV STENOSIS PRESSURE HALF TIME: 82.46 MS
MV VALVE AREA P 1/2 METHOD: 2.67 CM2
OPIATES UR QL SCN: NEGATIVE
PCP UR QL SCN>25 NG/ML: NEGATIVE
PULM VEIN S/D RATIO: 1.32
PV PEAK D VEL: 0.34 M/S
PV PEAK S VEL: 0.45 M/S
RA MAJOR: 3.55 CM
RA PRESSURE: 3 MMHG
RA WIDTH: 2.83 CM
RIGHT VENTRICULAR END-DIASTOLIC DIMENSION: 3.34 CM
SINUS: 3.4 CM
STJ: 2.51 CM
TDI LATERAL: 0.09 M/S
TDI SEPTAL: 0.08 M/S
TDI: 0.09 M/S
TOXICOLOGY INFORMATION: NORMAL
TRICUSPID ANNULAR PLANE SYSTOLIC EXCURSION: 1.72 CM

## 2023-05-15 PROCEDURE — 93306 ECHO (CUPID ONLY): ICD-10-PCS | Mod: 26,NTX,, | Performed by: INTERNAL MEDICINE

## 2023-05-15 PROCEDURE — 3078F PR MOST RECENT DIASTOLIC BLOOD PRESSURE < 80 MM HG: ICD-10-PCS | Mod: CPTII,NTX,S$GLB, | Performed by: INTERNAL MEDICINE

## 2023-05-15 PROCEDURE — 99214 OFFICE O/P EST MOD 30 MIN: CPT | Mod: 25,NTX,S$GLB, | Performed by: INTERNAL MEDICINE

## 2023-05-15 PROCEDURE — 99999 PR PBB SHADOW E&M-EST. PATIENT-LVL V: ICD-10-PCS | Mod: PBBFAC,TXP,, | Performed by: INTERNAL MEDICINE

## 2023-05-15 PROCEDURE — 3077F PR MOST RECENT SYSTOLIC BLOOD PRESSURE >= 140 MM HG: ICD-10-PCS | Mod: CPTII,NTX,S$GLB, | Performed by: INTERNAL MEDICINE

## 2023-05-15 PROCEDURE — 71250 CT THORAX DX C-: CPT | Mod: 26,NTX,, | Performed by: RADIOLOGY

## 2023-05-15 PROCEDURE — 93306 TTE W/DOPPLER COMPLETE: CPT | Mod: 26,NTX,, | Performed by: INTERNAL MEDICINE

## 2023-05-15 PROCEDURE — 3008F PR BODY MASS INDEX (BMI) DOCUMENTED: ICD-10-PCS | Mod: CPTII,NTX,S$GLB, | Performed by: INTERNAL MEDICINE

## 2023-05-15 PROCEDURE — 94010 BREATHING CAPACITY TEST: CPT | Mod: NTX,S$GLB,, | Performed by: INTERNAL MEDICINE

## 2023-05-15 PROCEDURE — 71250 CT THORAX DX C-: CPT | Mod: TC,TXP

## 2023-05-15 PROCEDURE — 94618 PULMONARY STRESS TESTING: ICD-10-PCS | Mod: NTX,S$GLB,, | Performed by: INTERNAL MEDICINE

## 2023-05-15 PROCEDURE — 93306 TTE W/DOPPLER COMPLETE: CPT | Mod: TXP

## 2023-05-15 PROCEDURE — 3077F SYST BP >= 140 MM HG: CPT | Mod: CPTII,NTX,S$GLB, | Performed by: INTERNAL MEDICINE

## 2023-05-15 PROCEDURE — 1159F PR MEDICATION LIST DOCUMENTED IN MEDICAL RECORD: ICD-10-PCS | Mod: CPTII,NTX,S$GLB, | Performed by: INTERNAL MEDICINE

## 2023-05-15 PROCEDURE — 1159F MED LIST DOCD IN RCRD: CPT | Mod: CPTII,NTX,S$GLB, | Performed by: INTERNAL MEDICINE

## 2023-05-15 PROCEDURE — 3008F BODY MASS INDEX DOCD: CPT | Mod: CPTII,NTX,S$GLB, | Performed by: INTERNAL MEDICINE

## 2023-05-15 PROCEDURE — 99999 PR PBB SHADOW E&M-EST. PATIENT-LVL V: CPT | Mod: PBBFAC,TXP,, | Performed by: INTERNAL MEDICINE

## 2023-05-15 PROCEDURE — 94726 PULM FUNCT TST PLETHYSMOGRAP: ICD-10-PCS | Mod: NTX,S$GLB,, | Performed by: INTERNAL MEDICINE

## 2023-05-15 PROCEDURE — 80307 DRUG TEST PRSMV CHEM ANLYZR: CPT | Mod: TXP | Performed by: INTERNAL MEDICINE

## 2023-05-15 PROCEDURE — 99214 PR OFFICE/OUTPT VISIT, EST, LEVL IV, 30-39 MIN: ICD-10-PCS | Mod: 25,NTX,S$GLB, | Performed by: INTERNAL MEDICINE

## 2023-05-15 PROCEDURE — 94729 PR C02/MEMBANE DIFFUSE CAPACITY: ICD-10-PCS | Mod: NTX,S$GLB,, | Performed by: INTERNAL MEDICINE

## 2023-05-15 PROCEDURE — 80323 ALKALOIDS NOS: CPT | Mod: TXP | Performed by: INTERNAL MEDICINE

## 2023-05-15 PROCEDURE — 94010 BREATHING CAPACITY TEST: ICD-10-PCS | Mod: NTX,S$GLB,, | Performed by: INTERNAL MEDICINE

## 2023-05-15 PROCEDURE — 71250 CT CHEST WITHOUT CONTRAST: ICD-10-PCS | Mod: 26,NTX,, | Performed by: RADIOLOGY

## 2023-05-15 PROCEDURE — 94726 PLETHYSMOGRAPHY LUNG VOLUMES: CPT | Mod: NTX,S$GLB,, | Performed by: INTERNAL MEDICINE

## 2023-05-15 PROCEDURE — 94618 PULMONARY STRESS TESTING: CPT | Mod: NTX,S$GLB,, | Performed by: INTERNAL MEDICINE

## 2023-05-15 PROCEDURE — 94729 DIFFUSING CAPACITY: CPT | Mod: NTX,S$GLB,, | Performed by: INTERNAL MEDICINE

## 2023-05-15 PROCEDURE — 3078F DIAST BP <80 MM HG: CPT | Mod: CPTII,NTX,S$GLB, | Performed by: INTERNAL MEDICINE

## 2023-05-15 RX ORDER — TRAMADOL HYDROCHLORIDE 50 MG/1
50 TABLET ORAL EVERY 12 HOURS PRN
COMMUNITY
Start: 2023-04-14

## 2023-05-15 RX ORDER — PREDNISONE 20 MG/1
2 TABLET ORAL DAILY
COMMUNITY
Start: 2023-05-04

## 2023-05-15 RX ORDER — IPRATROPIUM BROMIDE 0.5 MG/2.5ML
SOLUTION RESPIRATORY (INHALATION) EVERY 4 HOURS PRN
COMMUNITY
Start: 2023-05-04

## 2023-05-15 RX ORDER — IBUPROFEN 600 MG/1
600 TABLET ORAL 4 TIMES DAILY
COMMUNITY
Start: 2023-04-03

## 2023-05-15 RX ORDER — TRETINOIN 0.5 MG/G
CREAM TOPICAL
COMMUNITY
Start: 2023-03-27

## 2023-05-15 RX ORDER — DOXYCYCLINE 100 MG/1
1 CAPSULE ORAL 2 TIMES DAILY
COMMUNITY
Start: 2023-05-10 | End: 2023-06-14

## 2023-05-15 RX ORDER — NALOXONE HYDROCHLORIDE 4 MG/.1ML
SPRAY NASAL
COMMUNITY
Start: 2023-02-14

## 2023-05-15 RX ORDER — ALBUTEROL SULFATE 0.83 MG/ML
2.5 SOLUTION RESPIRATORY (INHALATION) EVERY 4 HOURS PRN
COMMUNITY
Start: 2023-05-04

## 2023-05-15 RX ORDER — NYSTATIN AND TRIAMCINOLONE ACETONIDE 100000; 1 [USP'U]/G; MG/G
OINTMENT TOPICAL
COMMUNITY
Start: 2023-03-02

## 2023-05-15 RX ORDER — NYSTATIN 100000 [USP'U]/G
POWDER TOPICAL
COMMUNITY
Start: 2023-03-02

## 2023-05-15 NOTE — LETTER
May 15, 2023        Denis Rai  [address] [facility] [place] [address]  Phone: [phone]  Fax: [phone]             [facility] - Trans[facility]  [address]  [facility] [address]  Phone: [phone]   Patient: Antonella Chase   MR Number: [id number]   YOB: 1960   Date of Visit: 5/15/2023       Dear Dr. Denis Rai    Thank you for referring Antonella Chase to me for evaluation. Attached you will find relevant portions of my assessment and plan of care.    If you have questions, please do not hesitate to call me. I look forward to following Antonella Chase along with you.    Sincerely,    Syeda Arango, DO    Enclosure    If you would like to receive this communication electronically, please contact [email or web address] or [phone] to request [employer] access.    [employer] is a tool which provides read-only access to select patient information with whom you have a relationship. Its easy to use and provides real time access to review your patients record including encounter summaries, notes, results, and demographic information.    If you feel you have received this communication in error or would no longer like to receive these types of communications, please e-mail [email or web address]

## 2023-05-15 NOTE — PROGRESS NOTES
LUNG TRANSPLANT PRE FOLLOW-UP    Referring Physician: Denis Rai    Reason for Visit:  Pre-lung transplant follow-up.         Date of Initial Evaluation:                                                                                              History of Present Illness: Antonella Chase is a 62 y.o. female who is on  2-6 L of oxygen. She is on no assisted ventilation.  Her New York Heart Association Class is III and a Karnofsky score of 70% - Cares for self: Unable to carry on normal activity or active work. She is not diabetic.     Patient returns for follow up visit.  Since her last visit, she has declined.  She had an exacerbation for which her cardiologist prescribed her lasix.  She states that her dyspnea is worse as she becomes more short of breath with everyday activities.  She has difficulty with climbing stairs, walking inclines, doing laundry and other household chores.  Takes inhaler therapy as prescribed and takes OFEV for IPF.  She is compliant with all medications.       Review of Systems   Constitutional:  Negative for chills, diaphoresis, fever, malaise/fatigue and weight loss.   HENT:  Negative for congestion, nosebleeds and sinus pain.    Eyes:  Negative for blurred vision.   Respiratory:  Positive for cough (chronic, dry) and shortness of breath (with exertion, worse than previous). Negative for hemoptysis, sputum production and wheezing.    Cardiovascular:  Negative for chest pain, palpitations, orthopnea, claudication and leg swelling.   Gastrointestinal:  Negative for abdominal pain, constipation, diarrhea, heartburn, nausea and vomiting.   Genitourinary:  Negative for dysuria, frequency and urgency.   Musculoskeletal:  Negative for myalgias.   Skin:  Negative for itching and rash.   Neurological:  Negative for dizziness, tremors and headaches.   Psychiatric/Behavioral:  Negative for substance abuse. The patient is not nervous/anxious.    Objective:   BP (!) 158/72 (BP Location: Right  "arm, Patient Position: Sitting, BP Method: Medium (Automatic))   Pulse 81   Resp 20   Ht 5' 3" (1.6 m)   Wt 63.4 kg (139 lb 12.4 oz)   SpO2 96%   PF (!) 2 L/min   BMI 24.76 kg/m²   Physical Exam  Constitutional:       Appearance: She is well-developed.      Interventions: Nasal cannula in place.   HENT:      Head: Normocephalic and atraumatic.   Eyes:      Conjunctiva/sclera: Conjunctivae normal.   Cardiovascular:      Rate and Rhythm: Normal rate and regular rhythm.      Heart sounds: Normal heart sounds.   Pulmonary:      Effort: Pulmonary effort is normal.      Breath sounds: Decreased air movement present. Examination of the right-middle field reveals decreased breath sounds. Examination of the left-middle field reveals decreased breath sounds. Examination of the right-lower field reveals decreased breath sounds. Examination of the left-lower field reveals decreased breath sounds. Decreased breath sounds and rales (trace) present.   Abdominal:      General: Bowel sounds are normal.      Palpations: Abdomen is soft.   Musculoskeletal:         General: Normal range of motion.      Cervical back: Normal range of motion and neck supple.   Skin:     General: Skin is warm and dry.   Neurological:      Mental Status: She is alert and oriented to person, place, and time.     Labs:  Hospital Outpatient Visit on 05/15/2023   Component Date Value    Pre FVC 05/15/2023 2.07 (L)     PRE FEV5 05/15/2023 0.86 (L)     Pre FEV1 05/15/2023 1.19 (L)     Pre FEV1 FVC 05/15/2023 57.30 (L)     Pre FEF 25 75 05/15/2023 0.57 (L)     Pre PEF 05/15/2023 3.61 (L)     Pre  05/15/2023 6.53     Pre DLCO 05/15/2023 5.54 (L)     DLCO ADJ PRE 05/15/2023 5.36 (L)     DLCOVA PRE 05/15/2023 1.77 (L)     DLVAAdj PRE 05/15/2023 1.71 (L)     VA PRE 05/15/2023 3.14 (L)     IVC PRE 05/15/2023 2.09 (L)     Pre TLC 05/15/2023 5.16     VC PRE 05/15/2023 2.12 (L)     PRE IC 05/15/2023 1.78     Pre FRC PL 05/15/2023 3.38     Pre ERV 05/15/2023 " 0.34     Pre RV 05/15/2023 3.04 (H)     RVTLC PRE 05/15/2023 58.90 (H)     Raw PRE 05/15/2023 4.78 (H)     sGaw PRE 05/15/2023 0.05 (L)     Pre VTG 05/15/2023 3.59     FVC Ref 05/15/2023 3.03     FVC LLN 05/15/2023 2.29     FVC Pre Ref 05/15/2023 68.4     FEV05 REF 05/15/2023 1.79     FEV05 LLN 05/15/2023 0.94     PRE FEV05 REF 05/15/2023 48.2     FEV1 Ref 05/15/2023 2.39     FEV1 LLN 05/15/2023 1.80     FEV1 Pre Ref 05/15/2023 49.6     FEV1 FVC Ref 05/15/2023 79     FEV1 FVC LLN 05/15/2023 67     FEV1 FVC Pre Ref 05/15/2023 72.2     FEF 25 75 Ref 05/15/2023 2.19     FEF 25 75 LLN 05/15/2023 1.11     FEF 25 75 Pre Ref 05/15/2023 25.9     PEF Ref 05/15/2023 6.14     PEF LLN 05/15/2023 4.48     PEF Pre Ref 05/15/2023 58.8     TLC Ref 05/15/2023 4.77     TLC LLN 05/15/2023 3.78     TLC ULN 05/15/2023 5.76     TLC Pre Ref 05/15/2023 108.2     VC Ref 05/15/2023 3.03     VC LLN 05/15/2023 2.29     VC ULN 05/15/2023 3.81     VC Pre Ref 05/15/2023 69.9     REF IC 05/15/2023 1.95     LLN IC 05/15/2023 -45237.05     ULN IC 05/15/2023 92127.95     PRE REF IC 05/15/2023 91.2     FRCpleth Ref 05/15/2023 2.64     FRCpleth LLN 05/15/2023 1.82     FRC PLETH ULN 05/15/2023 3.47     FRCpleth PreRef 05/15/2023 127.9     ERV Ref 05/15/2023 0.79     ERV LLN 05/15/2023 -88260.21     ERV ULN 05/15/2023 16258.79     ERV Pre Ref 05/15/2023 43.6     RV Ref 05/15/2023 1.86     RV LLN 05/15/2023 1.28     RV ULN 05/15/2023 2.43     RV Pre Ref 05/15/2023 163.8     RVTLC Ref 05/15/2023 39     RVTLC LLN 05/15/2023 30     RV TLC ULN 05/15/2023 49     RVTLC Pre Ref 05/15/2023 149.7     Raw Ref 05/15/2023 3.06     Raw Pre Ref 05/15/2023 156.3     sGaw Ref 05/15/2023 0.10     sGaw Pre Ref 05/15/2023 52.0     DLCO Single Breath Ref 05/15/2023 22.13     DLCO Single Breath LLN 05/15/2023 16.39     DLCO SINGLEBREATH ULN 05/15/2023 27.86     DLCO Single Breath Pre R* 05/15/2023 25.0     DLCOc Single Breath Ref 05/15/2023 22.13     DLCOc Single Breath LLN  "05/15/2023 16.39     DLCOC SINGLEBREATH ULN 05/15/2023 27.86     DLCOc Single Breath Pre * 05/15/2023 24.2     DLCOVA Ref 05/15/2023 4.64     DLCOVA LLN 05/15/2023 3.10     DLCOVA ULN 05/15/2023 6.18     DLCOVA Pre Ref 05/15/2023 38.1     DLCOc SBVA Ref 05/15/2023 4.64     DLCOc SBVA LLN 05/15/2023 3.10     DLCOCSBVA ULN 05/15/2023 6.18     DLCOc SBVA Pre Ref 05/15/2023 36.9     VA Single Breath Ref 05/15/2023 4.62     VA Single Breath LLN 05/15/2023 4.62     VA SINGLEBREATH ULN 05/15/2023 4.62     VA Single Breath Pre Ref 05/15/2023 67.9     IVC Single Breath Ref 05/15/2023 3.03     IVC Single Breath LLN 05/15/2023 2.29     IVC SINGLEBREATH ULN 05/15/2023 3.81     IVC Single Breath Pre Ref 05/15/2023 69.0    Hospital Outpatient Visit on 05/15/2023   Component Date Value    BSA 05/15/2023 1.67     TDI SEPTAL 05/15/2023 0.08     LV LATERAL E/E' RATIO 05/15/2023 7.89     LV SEPTAL E/E' RATIO 05/15/2023 8.88     LA WIDTH 05/15/2023 3.47     Right Atrial Pressure (f* 05/15/2023 3     EF 05/15/2023 65     TDI LATERAL 05/15/2023 0.09     LVIDd 05/15/2023 4.86     IVS 05/15/2023 0.65     Posterior Wall 05/15/2023 0.62     LVIDs 05/15/2023 2.97     FS 05/15/2023 39     LA volume 05/15/2023 37.11     Sinus 05/15/2023 3.40     STJ 05/15/2023 2.51     Ascending aorta 05/15/2023 2.85     LV mass 05/15/2023 96.74     LA size 05/15/2023 3.19     RVDD 05/15/2023 3.34     TAPSE 05/15/2023 1.72     Left Ventricle Relative * 05/15/2023 0.26     AV mean gradient 05/15/2023 3     AV valve area 05/15/2023 2.43     AV Velocity Ratio 05/15/2023 0.79     AV index (prosthetic) 05/15/2023 0.75     MV valve area p 1/2 meth* 05/15/2023 2.67     E/A ratio 05/15/2023 0.76     Mean e' 05/15/2023 0.09     E wave deceleration time 05/15/2023 284.33     MV "A" wave duration 05/15/2023 9.71     Pulm vein S/D ratio 05/15/2023 1.32     LVOT diameter 05/15/2023 2.04     LVOT area 05/15/2023 3.3     LVOT peak tobias 05/15/2023 0.84     LVOT peak VTI " 05/15/2023 16.04     Ao peak per 05/15/2023 1.07     Ao VTI 05/15/2023 21.52     LVOT stroke volume 05/15/2023 52.40     AV peak gradient 05/15/2023 5     E/E' ratio 05/15/2023 8.35     MV Peak E Per 05/15/2023 0.71     MV stenosis pressure 1/2* 05/15/2023 82.46     MV Peak A Per 05/15/2023 0.94     PV Peak S Per 05/15/2023 0.45     PV Peak D Per 05/15/2023 0.34     LV Systolic Volume 05/15/2023 34.28     LV Systolic Volume Index 05/15/2023 20.8     LV Diastolic Volume 05/15/2023 110.65     LV Diastolic Volume Index 05/15/2023 67.06     LA Volume Index 05/15/2023 22.5     LV Mass Index 05/15/2023 59     RA Major Axis 05/15/2023 3.55     Left Atrium Minor Axis 05/15/2023 3.75     Left Atrium Major Axis 05/15/2023 4.16     LA Volume Index (Mod) 05/15/2023 20.9     LA volume (mod) 05/15/2023 34.51     RA Width 05/15/2023 2.83        Pulmonary Function Tests 5/15/2023 3/30/2023 11/18/2021 10/20/2020 5/4/2020   FVC 2.07 2.26 2.56 2.31 2.5   FEV1 1.19 1.26 1.39 1.17 1.32   TLC (liters) 5.16 5.31 4.52 4.36 5.41   DLCO (ml/mmHg sec) 5.54 6.36 8.9 11.7 9.8   FVC% 68.4 74.4 83 74 76   FEV1% 49.6 52.6 57 47 51   FEF 25-75 0.57 0.59 0.61 - -   FEF 25-75% 25.9 27.1 27 - -   TLC% 108.2 111.3 95 91 106   RV 3.04 3.05 1.74 2.03 2.85   RV% 163.8 164.4 95 111 147   DLCO% 25 28.7 40 52 40     6MW 5/15/2023 3/30/2023 12/29/2022 11/18/2021 2/4/2021   6MWT Status completed without stopping completed without stopping completed without stopping completed without stopping completed without stopping   Patient Reported Chest pain;Dizziness;Dyspnea;Lightheadedness;Leg pain Dyspnea Chest pain;Dyspnea;Leg pain;Lightheadedness Chest pain;Dyspnea Dyspnea;Leg pain;Lightheadedness   Was O2 used? Yes Yes Yes No No   Delivery Method Cannula;Pull Tank;Continuous Flow Cannula;Pull Tank;Continuous Flow Cannula;Pull Tank;Continuous Flow - -   6MW Distance walked (feet) 1300 1200 1300 1500 1300   Distance walked (meters) 396.24 365.76 396.24 457.2 396.24    Did patient stop? No No No No No   Oxygen Saturation 96 98 97 96 94   Supplemental Oxygen 2 L/M 2 L/M 2 L/M Room Air Room Air   Heart Rate 81 94 80 70 90   Blood Pressure 136/65 143/61 160/67 111/56 133/69   Stephanie Dyspnea Rating  very light nothing at all nothing at all light very light   Oxygen Saturation 92 90 91 88 89   Supplemental Oxygen 6 L/M 4 L/M 2 L/M Room Air Room Air   Heart Rate 95 100 81 91 105   Blood Pressure 153/67 152/73 135/61 140/65 143/70   Stephanie Dyspnea Rating  heavy somewhat heavy heavy heavy moderate   Recovery Time (seconds) 55 66 128 162 91   Oxygen Saturation 95 98 98 97 94   Supplemental Oxygen 6 L/M 4 L/M 2 L/M Room Air Room Air   Heart Rate - 89 84 73 -       Cardiodiagnostics:  Results for orders placed during the hospital encounter of 05/15/23    Echo Saline Bubble? No    Interpretation Summary  · The left ventricle is normal in size with normal systolic function.  · The estimated ejection fraction is 65%.  · Normal left ventricular diastolic function.  · Normal right ventricular size with normal right ventricular systolic function.  · Normal central venous pressure (3 mmHg).      Assessment:-  1. Interstitial pulmonary disease, unspecified    2. Pulmonary emphysema with fibrosis of lung    3. Chronic respiratory failure with hypoxia    4. Lung transplant candidate      Plan:   Pt followed for combined COPD and IPF.  FVC and DLCO have declined from previous.  Has offsetting obstructive and restrictive processes.  CT chest without significant progression.  Had recent exacerbation.  Now symptomatically worse.  Continue with OFEV.  GERD and nasal symptoms controlled.    Continue with inhaler regimen as prescribed.      Continue with supplemental oxygen at 2-6L as prescribed.  Had a RHC last year without evidence of PH.  Repeat TTE without evidence of PH.      With regards to her lung transplant candidacy for a diagnosis of combined COPD/IPF, she is likely ready for transplant workup AEB  her increase in symptoms, decrease in PFTs, and higher oxygen demands with exertion.  Will initiate lung transplant workup.  RTC based on workup.    Ricky Goldman NP  Lung Transplant/Advanced Lung Disease

## 2023-05-16 ENCOUNTER — TELEPHONE (OUTPATIENT)
Dept: TRANSPLANT | Facility: CLINIC | Age: 63
End: 2023-05-16
Payer: MEDICARE

## 2023-05-16 LAB
DLCO ADJ PRE: 5.36 ML/(MIN*MMHG) (ref 16.39–27.86)
DLCO SINGLE BREATH LLN: 16.39
DLCO SINGLE BREATH PRE REF: 25 %
DLCO SINGLE BREATH REF: 22.13
DLCOC SBVA LLN: 3.1
DLCOC SBVA PRE REF: 36.9 %
DLCOC SBVA REF: 4.64
DLCOC SINGLE BREATH LLN: 16.39
DLCOC SINGLE BREATH PRE REF: 24.2 %
DLCOC SINGLE BREATH REF: 22.13
DLCOCSBVAULN: 6.18
DLCOCSINGLEBREATHULN: 27.86
DLCOSINGLEBREATHULN: 27.86
DLCOVA LLN: 3.1
DLCOVA PRE REF: 38.1 %
DLCOVA PRE: 1.77 ML/(MIN*MMHG*L) (ref 3.1–6.18)
DLCOVA REF: 4.64
DLCOVAULN: 6.18
DLVAADJ PRE: 1.71 ML/(MIN*MMHG*L) (ref 3.1–6.18)
ERV LLN: -16449.21
ERV PRE REF: 43.6 %
ERV REF: 0.79
ERVULN: ABNORMAL
FEF 25 75 LLN: 1.11
FEF 25 75 PRE REF: 25.9 %
FEF 25 75 REF: 2.19
FEV05 LLN: 0.94
FEV05 REF: 1.79
FEV1 FVC LLN: 67
FEV1 FVC PRE REF: 72.2 %
FEV1 FVC REF: 79
FEV1 LLN: 1.8
FEV1 PRE REF: 49.6 %
FEV1 REF: 2.39
FRCPLETH LLN: 1.82
FRCPLETH PREREF: 127.9 %
FRCPLETH REF: 2.64
FRCPLETHULN: 3.47
FVC LLN: 2.29
FVC PRE REF: 68.4 %
FVC REF: 3.03
IVC PRE: 2.09 L (ref 2.29–3.81)
IVC SINGLE BREATH LLN: 2.29
IVC SINGLE BREATH PRE REF: 69 %
IVC SINGLE BREATH REF: 3.03
IVCSINGLEBREATHULN: 3.81
LLN IC: -16448.05
PEF LLN: 4.48
PEF PRE REF: 58.8 %
PEF REF: 6.14
PHYSICIAN COMMENT: ABNORMAL
PRE DLCO: 5.54 ML/(MIN*MMHG) (ref 16.39–27.86)
PRE ERV: 0.34 L (ref -16449.21–16450.79)
PRE FEF 25 75: 0.57 L/S (ref 1.11–3.64)
PRE FET 100: 6.53 SEC
PRE FEV05 REF: 48.2 %
PRE FEV1 FVC: 57.3 % (ref 67.25–89.73)
PRE FEV1: 1.19 L (ref 1.8–2.96)
PRE FEV5: 0.86 L (ref 0.94–2.65)
PRE FRC PL: 3.38 L (ref 1.82–3.47)
PRE FVC: 2.07 L (ref 2.29–3.81)
PRE IC: 1.78 L (ref -16448.05–16451.95)
PRE PEF: 3.61 L/S (ref 4.48–7.8)
PRE REF IC: 91.2 %
PRE RV: 3.04 L (ref 1.28–2.43)
PRE TLC: 5.16 L (ref 3.78–5.76)
PRE VTG: 3.59 L
RAW PRE REF: 156.3 %
RAW PRE: 4.78 CMH2O*S/L (ref 3.06–3.06)
RAW REF: 3.06
REF IC: 1.95
RV LLN: 1.28
RV PRE REF: 163.8 %
RV REF: 1.86
RVTLC LLN: 30
RVTLC PRE REF: 149.7 %
RVTLC PRE: 58.9 % (ref 29.77–48.95)
RVTLC REF: 39
RVTLCULN: 49
RVULN: 2.43
SGAW PRE REF: 52 %
SGAW PRE: 0.05 1/(CMH2O*S) (ref 0.1–0.1)
SGAW REF: 0.1
TLC LLN: 3.78
TLC PRE REF: 108.2 %
TLC REF: 4.77
TLC ULN: 5.76
ULN IC: ABNORMAL
VA PRE: 3.14 L (ref 4.62–4.62)
VA SINGLE BREATH LLN: 4.62
VA SINGLE BREATH PRE REF: 67.9 %
VA SINGLE BREATH REF: 4.62
VASINGLEBREATHULN: 4.62
VC LLN: 2.29
VC PRE REF: 69.9 %
VC PRE: 2.12 L (ref 2.29–3.81)
VC REF: 3.03
VC ULN: 3.81

## 2023-05-16 NOTE — TELEPHONE ENCOUNTER
Message sent to [facility] for financial clearance for outpatient lung transplant evaluation.  Clinicals forwarded to [facility].

## 2023-05-16 NOTE — PROCEDURES
Antonella Chase is a 62 y.o.  female patient, who presents for a 6 minute walk test ordered by DO Conchita.  The diagnosis is Interstitial Lung Disease.  The patient's BMI is 24.8 kg/m2.  Predicted distance (lower limit of normal) is 373.45 meters.      Test Results:    The test was completed without stopping.  The total time walked was 360 seconds.  During walking, the patient reported:  Chest pain, Dizziness, Dyspnea, Lightheadedness, Leg pain.  The patient used supplemental oxygen during testing.     05/15/2023---------Distance: 396.24 meters (1300 feet)     Lap Walk Time O2 Sat % Supplemental Oxygen Heart Rate Blood Pressure Stephanie Scale Comment   Pre-exercise  (Resting) 0 0 96 % 2 L/M 81 bpm 136/65 mmHg 1    During Exercise 1 50 sec 93 % 2 L/M 89 bpm      During Exercise 2 106 sec 87 % 2 L/M 97 bpm   Oxygen Increased   During Exercise 3 161 sec 86 % 3 L/M 90 bpm   Oxygen Increased   During Exercise 4 225 sec 87 % 4 L/M 94 bpm   Oxygen Increased   During Exercise 5 280 sec 91 % 6 L/M 95 bpm      During Exercise 6 331 sec 91 % 6 L/M 94 bpm      End of Exercise  360 sec 92 % 6 L/M 95 bpm 153/67 mmHg 5-6    Post-exercise  (Recovery)   95 % 6 L/M  86 bpm        Recovery Time: 55 seconds    Performing nurse/tech: GABE Phillip      PREVIOUS STUDY:   03/30/2023---------Distance: 365.76 meters (1200 feet)       Time O2 Sat % Supplemental Oxygen Heart Rate Blood Pressure Stephanie Scale Comment   Pre-exercise  (Resting) 0 98 % 2 L/M 94 bpm 143/61 mmHg 0     During Exercise 1 min 93 % 2 L/M 95 bpm         During Exercise 2 min 89 % 2 L/M 100 bpm         During Exercise 3 min 86 % 2 L/M 103 bpm     Oxygen Increased   During Exercise 4 min 87 % 3 L/M 104 bpm     Oxygen Increased   During Exercise 5 min 90 % 4 L/M 103 bpm         During Exercise 6 min 90 % 4 L/M 100 bpm 152/73 mmHg 4     Post-exercise  (Recovery)   98 % 4 L/M  89 bpm             CLINICAL INTERPRETATION:  Six minute walk distance is 396.24 meters (1300 feet)  with heavy dyspnea.  During exercise, there was significant desaturation while breathing supplemental oxygen.  Both blood pressure and heart rate remained stable with walking.  The patient reported non-pulmonary symptoms during exercise.  Since the previous study in March 2023, exercise capacity is unchanged.  Based upon age and body mass index, exercise capacity is normal.

## 2023-05-18 ENCOUNTER — PATIENT MESSAGE (OUTPATIENT)
Dept: TRANSPLANT | Facility: CLINIC | Age: 63
End: 2023-05-18
Payer: MEDICARE

## 2023-05-18 LAB
ANABASINE UR-MCNC: <2 NG/ML
COTININE UR-MCNC: <5 NG/ML
NICOTINE UR-MCNC: <5 NG/ML
NORNICOTINE UR-MCNC: <2 NG/ML

## 2023-05-19 ENCOUNTER — TELEPHONE (OUTPATIENT)
Dept: TRANSPLANT | Facility: CLINIC | Age: 63
End: 2023-05-19
Payer: MEDICARE

## 2023-05-25 ENCOUNTER — PATIENT MESSAGE (OUTPATIENT)
Dept: TRANSPLANT | Facility: CLINIC | Age: 63
End: 2023-05-25
Payer: MEDICARE

## 2023-05-26 ENCOUNTER — TELEPHONE (OUTPATIENT)
Dept: TRANSPLANT | Facility: CLINIC | Age: 63
End: 2023-05-26
Payer: MEDICARE

## 2023-05-26 NOTE — TELEPHONE ENCOUNTER
Patient called to clarify scheduling of outpatient evaluation. Patient reports she misunderstood previous conversation, that she needed to report a primary caregiver and provide requested date. Dr. Luisito Gurrola in [place]. Mammogram done Wednesday, gyn exam done Wednesday.   Requesting week of June 5 to begin evaluation testing, she verbalized understanding that she will may not complete entire battery of tests/visits depending on providers' availabilities. All questions answered at this time. She is aware she will be contacted when appointments are scheduled and her testing preparation package is mailed. Notified primary coordinator.

## 2023-05-27 ENCOUNTER — PATIENT MESSAGE (OUTPATIENT)
Dept: TRANSPLANT | Facility: CLINIC | Age: 63
End: 2023-05-27
Payer: MEDICARE

## 2023-05-29 ENCOUNTER — TELEPHONE (OUTPATIENT)
Dept: TRANSPLANT | Facility: CLINIC | Age: 63
End: 2023-05-29
Payer: MEDICARE

## 2023-05-29 DIAGNOSIS — J84.10 PULMONARY EMPHYSEMA WITH FIBROSIS OF LUNG: Primary | ICD-10-CM

## 2023-05-29 DIAGNOSIS — Z76.82 LUNG TRANSPLANT CANDIDATE: ICD-10-CM

## 2023-05-29 DIAGNOSIS — J84.112 IPF (IDIOPATHIC PULMONARY FIBROSIS): Primary | ICD-10-CM

## 2023-05-29 DIAGNOSIS — J84.10 PULMONARY EMPHYSEMA WITH FIBROSIS OF LUNG: ICD-10-CM

## 2023-05-29 DIAGNOSIS — J43.9 PULMONARY EMPHYSEMA WITH FIBROSIS OF LUNG: ICD-10-CM

## 2023-05-29 DIAGNOSIS — K21.9 GASTROESOPHAGEAL REFLUX DISEASE, UNSPECIFIED WHETHER ESOPHAGITIS PRESENT: ICD-10-CM

## 2023-05-29 DIAGNOSIS — J43.9 PULMONARY EMPHYSEMA WITH FIBROSIS OF LUNG: Primary | ICD-10-CM

## 2023-05-30 ENCOUNTER — DOCUMENTATION ONLY (OUTPATIENT)
Dept: TRANSPLANT | Facility: CLINIC | Age: 63
End: 2023-05-30
Payer: MEDICARE

## 2023-05-31 DIAGNOSIS — I99.8 OTHER DISORDER OF CIRCULATORY SYSTEM: ICD-10-CM

## 2023-05-31 DIAGNOSIS — Z76.82 ORGAN TRANSPLANT CANDIDATE: Primary | ICD-10-CM

## 2023-05-31 DIAGNOSIS — J84.112 IPF (IDIOPATHIC PULMONARY FIBROSIS): ICD-10-CM

## 2023-05-31 RX ORDER — SODIUM CHLORIDE 0.9 % (FLUSH) 0.9 %
10 SYRINGE (ML) INJECTION
Status: SHIPPED | OUTPATIENT
Start: 2023-05-31

## 2023-05-31 RX ORDER — DIPHENHYDRAMINE HCL 50 MG
50 CAPSULE ORAL ONCE
Status: CANCELLED | OUTPATIENT
Start: 2023-05-31 | End: 2023-05-31

## 2023-05-31 RX ORDER — SODIUM CHLORIDE 9 MG/ML
INJECTION, SOLUTION INTRAVENOUS CONTINUOUS
Status: CANCELLED | OUTPATIENT
Start: 2023-05-31 | End: 2023-05-31

## 2023-06-01 ENCOUNTER — DOCUMENTATION ONLY (OUTPATIENT)
Dept: TRANSPLANT | Facility: CLINIC | Age: 63
End: 2023-06-01
Payer: MEDICARE

## 2023-06-12 ENCOUNTER — HOSPITAL ENCOUNTER (OUTPATIENT)
Dept: RADIOLOGY | Facility: HOSPITAL | Age: 63
Discharge: HOME | End: 2023-06-12
Attending: INTERNAL MEDICINE
Payer: MEDICARE

## 2023-06-12 ENCOUNTER — HOSPITAL ENCOUNTER (OUTPATIENT)
Dept: CARDIOLOGY | Facility: CLINIC | Age: 63
Discharge: HOME | End: 2023-06-12
Payer: MEDICARE

## 2023-06-12 ENCOUNTER — CLINICAL SUPPORT (OUTPATIENT)
Dept: TRANSPLANT | Facility: CLINIC | Age: 63
End: 2023-06-12
Payer: MEDICARE

## 2023-06-12 ENCOUNTER — OFFICE VISIT (OUTPATIENT)
Dept: INFECTIOUS DISEASES | Facility: CLINIC | Age: 63
End: 2023-06-12
Payer: MEDICARE

## 2023-06-12 ENCOUNTER — HOSPITAL ENCOUNTER (OUTPATIENT)
Dept: PULMONOLOGY | Facility: CLINIC | Age: 63
Discharge: HOME | End: 2023-06-12
Payer: MEDICARE

## 2023-06-12 ENCOUNTER — HOSPITAL ENCOUNTER (OUTPATIENT)
Dept: RADIOLOGY | Facility: CLINIC | Age: 63
Discharge: HOME | End: 2023-06-12
Attending: INTERNAL MEDICINE
Payer: MEDICARE

## 2023-06-12 ENCOUNTER — TELEPHONE (OUTPATIENT)
Dept: INFECTIOUS DISEASES | Facility: CLINIC | Age: 63
End: 2023-06-12
Payer: MEDICARE

## 2023-06-12 ENCOUNTER — CLINICAL SUPPORT (OUTPATIENT)
Dept: INFECTIOUS DISEASES | Facility: CLINIC | Age: 63
End: 2023-06-12
Payer: MEDICARE

## 2023-06-12 VITALS
HEIGHT: 63 IN | WEIGHT: 139.75 LBS | BODY MASS INDEX: 24.76 KG/M2 | HEART RATE: 69 BPM | TEMPERATURE: 98 F | DIASTOLIC BLOOD PRESSURE: 77 MMHG | SYSTOLIC BLOOD PRESSURE: 120 MMHG

## 2023-06-12 DIAGNOSIS — Z76.82 LUNG TRANSPLANT CANDIDATE: Primary | ICD-10-CM

## 2023-06-12 DIAGNOSIS — J43.9 PULMONARY EMPHYSEMA WITH FIBROSIS OF LUNG: ICD-10-CM

## 2023-06-12 DIAGNOSIS — Z76.82 LUNG TRANSPLANT CANDIDATE: ICD-10-CM

## 2023-06-12 DIAGNOSIS — J84.10 PULMONARY EMPHYSEMA WITH FIBROSIS OF LUNG: ICD-10-CM

## 2023-06-12 LAB
ALLENS TEST: ABNORMAL
DELSYS: ABNORMAL
FIO2: 30
FLOW: 3
HCO3 UR-SCNC: 26.1 MMOL/L (ref 24–28)
MODE: ABNORMAL
PCO2 BLDA: 42.4 MMHG (ref 35–45)
PH SMN: 7.4 [PH] (ref 7.35–7.45)
PO2 BLDA: 121 MMHG (ref 80–100)
POC BE: 1 MMOL/L
POC SATURATED O2: 99 % (ref 95–100)
POC TCO2: 27 MMOL/L (ref 23–27)
SAMPLE: ABNORMAL
SITE: ABNORMAL

## 2023-06-12 PROCEDURE — 78597 LUNG PERFUSION DIFFERENTIAL: CPT | Mod: 26,TXP,, | Performed by: STUDENT IN AN ORGANIZED HEALTH CARE EDUCATION/TRAINING PROGRAM

## 2023-06-12 PROCEDURE — 3008F PR BODY MASS INDEX (BMI) DOCUMENTED: ICD-10-PCS | Mod: CPTII,TXP,, | Performed by: INTERNAL MEDICINE

## 2023-06-12 PROCEDURE — 78597 NM LUNG QUANT DIFFERENTIAL PERFUSION W IMAGING: ICD-10-PCS | Mod: 26,TXP,, | Performed by: STUDENT IN AN ORGANIZED HEALTH CARE EDUCATION/TRAINING PROGRAM

## 2023-06-12 PROCEDURE — 3074F SYST BP LT 130 MM HG: CPT | Mod: CPTII,TXP,, | Performed by: INTERNAL MEDICINE

## 2023-06-12 PROCEDURE — 3074F PR MOST RECENT SYSTOLIC BLOOD PRESSURE < 130 MM HG: ICD-10-PCS | Mod: CPTII,TXP,, | Performed by: INTERNAL MEDICINE

## 2023-06-12 PROCEDURE — 93005 EKG 12-LEAD: ICD-10-PCS | Mod: S$GLB,TXP,, | Performed by: INTERNAL MEDICINE

## 2023-06-12 PROCEDURE — 77080 DXA BONE DENSITY AXIAL: CPT | Mod: 26,TXP,, | Performed by: INTERNAL MEDICINE

## 2023-06-12 PROCEDURE — 3078F PR MOST RECENT DIASTOLIC BLOOD PRESSURE < 80 MM HG: ICD-10-PCS | Mod: CPTII,TXP,, | Performed by: INTERNAL MEDICINE

## 2023-06-12 PROCEDURE — 90694 VACC AIIV4 NO PRSRV 0.5ML IM: CPT | Mod: PBBFAC,TXP

## 2023-06-12 PROCEDURE — 99999 PR PBB SHADOW E&M-EST. PATIENT-LVL I: ICD-10-PCS | Mod: PBBFAC,TXP,,

## 2023-06-12 PROCEDURE — 1159F PR MEDICATION LIST DOCUMENTED IN MEDICAL RECORD: ICD-10-PCS | Mod: CPTII,TXP,, | Performed by: INTERNAL MEDICINE

## 2023-06-12 PROCEDURE — 90677 PCV20 VACCINE IM: CPT | Mod: PBBFAC,TXP

## 2023-06-12 PROCEDURE — 36600 WITHDRAWAL OF ARTERIAL BLOOD: CPT | Mod: S$PBB,TXP,, | Performed by: INTERNAL MEDICINE

## 2023-06-12 PROCEDURE — 3044F HG A1C LEVEL LT 7.0%: CPT | Mod: CPTII,TXP,, | Performed by: INTERNAL MEDICINE

## 2023-06-12 PROCEDURE — 36600 WITHDRAWAL OF ARTERIAL BLOOD: CPT | Mod: PBBFAC,TXP | Performed by: INTERNAL MEDICINE

## 2023-06-12 PROCEDURE — 99999 PR PBB SHADOW E&M-EST. PATIENT-LVL I: CPT | Mod: PBBFAC,TXP,,

## 2023-06-12 PROCEDURE — 82803 BLOOD GASES ANY COMBINATION: CPT | Mod: PBBFAC,TXP | Performed by: INTERNAL MEDICINE

## 2023-06-12 PROCEDURE — 36600 PR WITHDRAWAL OF ARTERIAL BLOOD: ICD-10-PCS | Mod: S$PBB,TXP,, | Performed by: INTERNAL MEDICINE

## 2023-06-12 PROCEDURE — 77080 DXA BONE DENSITY AXIAL SKELETON 1 OR MORE SITES: ICD-10-PCS | Mod: 26,TXP,, | Performed by: INTERNAL MEDICINE

## 2023-06-12 PROCEDURE — A9540 TC99M MAA: HCPCS | Mod: TXP

## 2023-06-12 PROCEDURE — 93010 ELECTROCARDIOGRAM REPORT: CPT | Mod: S$GLB,TXP,, | Performed by: INTERNAL MEDICINE

## 2023-06-12 PROCEDURE — 99999 PR PBB SHADOW E&M-EST. PATIENT-LVL V: ICD-10-PCS | Mod: PBBFAC,TXP,, | Performed by: INTERNAL MEDICINE

## 2023-06-12 PROCEDURE — 93005 ELECTROCARDIOGRAM TRACING: CPT | Mod: S$GLB,TXP,, | Performed by: INTERNAL MEDICINE

## 2023-06-12 PROCEDURE — 90472 IMMUNIZATION ADMIN EACH ADD: CPT | Mod: PBBFAC,TXP

## 2023-06-12 PROCEDURE — 99999 PR PBB SHADOW E&M-EST. PATIENT-LVL V: CPT | Mod: PBBFAC,TXP,, | Performed by: INTERNAL MEDICINE

## 2023-06-12 PROCEDURE — 99204 PR OFFICE/OUTPT VISIT, NEW, LEVL IV, 45-59 MIN: ICD-10-PCS | Mod: S$PBB,TXP,, | Performed by: INTERNAL MEDICINE

## 2023-06-12 PROCEDURE — 1160F RVW MEDS BY RX/DR IN RCRD: CPT | Mod: CPTII,TXP,, | Performed by: INTERNAL MEDICINE

## 2023-06-12 PROCEDURE — 1159F MED LIST DOCD IN RCRD: CPT | Mod: CPTII,TXP,, | Performed by: INTERNAL MEDICINE

## 2023-06-12 PROCEDURE — 1160F PR REVIEW ALL MEDS BY PRESCRIBER/CLIN PHARMACIST DOCUMENTED: ICD-10-PCS | Mod: CPTII,TXP,, | Performed by: INTERNAL MEDICINE

## 2023-06-12 PROCEDURE — 93005 ELECTROCARDIOGRAM TRACING: CPT | Mod: PBBFAC,TXP | Performed by: INTERNAL MEDICINE

## 2023-06-12 PROCEDURE — 3078F DIAST BP <80 MM HG: CPT | Mod: CPTII,TXP,, | Performed by: INTERNAL MEDICINE

## 2023-06-12 PROCEDURE — 93010 EKG 12-LEAD: ICD-10-PCS | Mod: S$GLB,TXP,, | Performed by: INTERNAL MEDICINE

## 2023-06-12 PROCEDURE — 3044F PR MOST RECENT HEMOGLOBIN A1C LEVEL <7.0%: ICD-10-PCS | Mod: CPTII,TXP,, | Performed by: INTERNAL MEDICINE

## 2023-06-12 PROCEDURE — 77080 DXA BONE DENSITY AXIAL: CPT | Mod: TC,TXP

## 2023-06-12 PROCEDURE — 3008F BODY MASS INDEX DOCD: CPT | Mod: CPTII,TXP,, | Performed by: INTERNAL MEDICINE

## 2023-06-12 PROCEDURE — 99204 OFFICE O/P NEW MOD 45 MIN: CPT | Mod: S$PBB,TXP,, | Performed by: INTERNAL MEDICINE

## 2023-06-12 NOTE — PROGRESS NOTES
PRE EDUCATION NOTE    Met with Antonella Chase and her sister for pre-transplant education and to consent for lung transplant evaluation.  Pre-transplant educational binder given to patient.  Instructed patient to read the information in the binder.  Explained the dental and PCP forms to the patient. Instructed her to have the forms sent to us upon completion.     Thorough pre-transplant education conducted.  Information presented included:  Evaluation process - consults, diagnostic testing and lab work. Explained that HIV testing is required for transplant evaluation and will be repeated at scheduled intervals before and after transplant. Added that education will be provided, results will be discussed, and the appropriate consults will be scheduled if needed.    Members of the transplant team and how to contact us  Selection committee members and role of the committee  Contraindications to lung transplantation  Policy for absolute smoking / nicotine cessation for at least 6 months prior to listing  Required pre-transplant COVID - 19 vaccination per CDC guidelines plus other recommended vaccinations  Relocation pre- and post-transplant  Listing process for transplant: explained the listing designations, active versus inactive, composite allocation score (JAGJIT) / range, and geographical distribution of organs  National graft survival statistics, our program's current survival statistics   Wait time on the list  The need to reach patient within 15 minutes with donor offer  [facility] donors with a risk of disease transmission (e.g., HIV, Hepatitis B, and/or Hepatitis C) plus usage of Hepatitis C antibody positive and JAHAIRA positive/negative donors  Donor criteria and testing on donor, procurement of donor lungs and ischemic time, blood transfusions, process for matching donor with recipient  Transplant surgery, single vs. double, estimated length of surgery, surgical incision, chest tubes and purpose, and  ventilator  Post-transplant management during the transplant event hospital stay  Need for a caregiver to be present at all times, beginning with discharge from ICU and transfer to TSU, through at least the first 3 months post-transplant and possibly longer  Post-transplant medications and the need for lifetime immunosuppression, potential medication side effects, and the importance of adherence   Discharge, follow-up clinic visits, testing with each visit, and surveillance bronchoscopies  Potential post-transplant complications including, but not limited to, rejection and infection   Health maintenance post-transplant   Provided written and verbal information about multiple listings and transfer of wait time  Informed Antonella Chase and her sister that the transplant team will manage immediate post-operative pain. If she requires long-term pain management, she will need to have that pain managed by her PCP, a pain management specialist, or the previous provider who wrote for chronic pain medications.    Antonella Chase and her sister were given the opportunity to ask questions which were answered to their satisfaction.      Informed Consent to Undergo Evaluation for Lung Transplantation reviewed and discussed with Antonella Chase and her sister. E-consent signed by patient. Copy of consent given to patient.

## 2023-06-12 NOTE — PROGRESS NOTES
Patient received the Prevnar 20 and HD flu vaccines in the right deltoid. Pt tolerated well. Pt asked to wait in the clinic 15 minutes after injection in the event of an allergic reaction. Pt verbalized understanding. Pt left in NAD.

## 2023-06-13 ENCOUNTER — SOCIAL WORK (OUTPATIENT)
Dept: TRANSPLANT | Facility: CLINIC | Age: 63
End: 2023-06-13
Payer: MEDICARE

## 2023-06-13 ENCOUNTER — HOSPITAL ENCOUNTER (OUTPATIENT)
Dept: RADIOLOGY | Facility: HOSPITAL | Age: 63
Discharge: HOME | End: 2023-06-13
Attending: INTERNAL MEDICINE
Payer: MEDICARE

## 2023-06-13 ENCOUNTER — PATIENT MESSAGE (OUTPATIENT)
Dept: PHARMACY | Facility: CLINIC | Age: 63
End: 2023-06-13
Payer: MEDICARE

## 2023-06-13 ENCOUNTER — OFFICE VISIT (OUTPATIENT)
Dept: CARDIOTHORACIC SURGERY | Facility: CLINIC | Age: 63
End: 2023-06-13
Payer: MEDICARE

## 2023-06-13 ENCOUNTER — NUTRITION (OUTPATIENT)
Dept: TRANSPLANT | Facility: CLINIC | Age: 63
End: 2023-06-13
Payer: MEDICARE

## 2023-06-13 VITALS
HEART RATE: 63 BPM | OXYGEN SATURATION: 97 % | SYSTOLIC BLOOD PRESSURE: 137 MMHG | RESPIRATION RATE: 20 BRPM | DIASTOLIC BLOOD PRESSURE: 65 MMHG | HEIGHT: 63 IN | WEIGHT: 139 LBS | BODY MASS INDEX: 24.63 KG/M2

## 2023-06-13 VITALS — WEIGHT: 139.13 LBS | BODY MASS INDEX: 24.65 KG/M2 | HEIGHT: 63 IN

## 2023-06-13 DIAGNOSIS — Z01.818 PRE-TRANSPLANT EVALUATION FOR LUNG TRANSPLANT: Primary | ICD-10-CM

## 2023-06-13 DIAGNOSIS — Z76.82 LUNG TRANSPLANT CANDIDATE: ICD-10-CM

## 2023-06-13 DIAGNOSIS — J84.10 PULMONARY EMPHYSEMA WITH FIBROSIS OF LUNG: ICD-10-CM

## 2023-06-13 DIAGNOSIS — J43.9 PULMONARY EMPHYSEMA WITH FIBROSIS OF LUNG: ICD-10-CM

## 2023-06-13 PROCEDURE — 3078F PR MOST RECENT DIASTOLIC BLOOD PRESSURE < 80 MM HG: ICD-10-PCS | Mod: CPTII,TXP,, | Performed by: THORACIC SURGERY (CARDIOTHORACIC VASCULAR SURGERY)

## 2023-06-13 PROCEDURE — 76700 US ABDOMEN COMPLETE: ICD-10-PCS | Mod: 26,TXP,, | Performed by: RADIOLOGY

## 2023-06-13 PROCEDURE — 99999 PR PBB SHADOW E&M-EST. PATIENT-LVL II: ICD-10-PCS | Mod: PBBFAC,TXP,, | Performed by: DIETITIAN, REGISTERED

## 2023-06-13 PROCEDURE — 99999 PR PBB SHADOW E&M-EST. PATIENT-LVL III: ICD-10-PCS | Mod: PBBFAC,TXP,, | Performed by: THORACIC SURGERY (CARDIOTHORACIC VASCULAR SURGERY)

## 2023-06-13 PROCEDURE — 3044F PR MOST RECENT HEMOGLOBIN A1C LEVEL <7.0%: ICD-10-PCS | Mod: CPTII,TXP,, | Performed by: THORACIC SURGERY (CARDIOTHORACIC VASCULAR SURGERY)

## 2023-06-13 PROCEDURE — 99204 OFFICE O/P NEW MOD 45 MIN: CPT | Mod: S$PBB,TXP,, | Performed by: THORACIC SURGERY (CARDIOTHORACIC VASCULAR SURGERY)

## 2023-06-13 PROCEDURE — 3075F PR MOST RECENT SYSTOLIC BLOOD PRESS GE 130-139MM HG: ICD-10-PCS | Mod: CPTII,TXP,, | Performed by: THORACIC SURGERY (CARDIOTHORACIC VASCULAR SURGERY)

## 2023-06-13 PROCEDURE — 3044F HG A1C LEVEL LT 7.0%: CPT | Mod: CPTII,TXP,, | Performed by: THORACIC SURGERY (CARDIOTHORACIC VASCULAR SURGERY)

## 2023-06-13 PROCEDURE — 93880 US CAROTID BILATERAL: ICD-10-PCS | Mod: 26,TXP,, | Performed by: RADIOLOGY

## 2023-06-13 PROCEDURE — 71046 X-RAY EXAM CHEST 2 VIEWS: CPT | Mod: TC,TXP

## 2023-06-13 PROCEDURE — 93880 EXTRACRANIAL BILAT STUDY: CPT | Mod: 26,TXP,, | Performed by: RADIOLOGY

## 2023-06-13 PROCEDURE — 99999 PR PBB SHADOW E&M-EST. PATIENT-LVL III: CPT | Mod: PBBFAC,TXP,, | Performed by: THORACIC SURGERY (CARDIOTHORACIC VASCULAR SURGERY)

## 2023-06-13 PROCEDURE — 97802 MEDICAL NUTRITION INDIV IN: CPT | Mod: PBBFAC,TXP | Performed by: DIETITIAN, REGISTERED

## 2023-06-13 PROCEDURE — 76700 US EXAM ABDOM COMPLETE: CPT | Mod: TC,TXP

## 2023-06-13 PROCEDURE — 3008F PR BODY MASS INDEX (BMI) DOCUMENTED: ICD-10-PCS | Mod: CPTII,TXP,, | Performed by: THORACIC SURGERY (CARDIOTHORACIC VASCULAR SURGERY)

## 2023-06-13 PROCEDURE — 71046 XR CHEST PA AND LATERAL: ICD-10-PCS | Mod: 26,TXP,, | Performed by: RADIOLOGY

## 2023-06-13 PROCEDURE — 76700 US EXAM ABDOM COMPLETE: CPT | Mod: 26,TXP,, | Performed by: RADIOLOGY

## 2023-06-13 PROCEDURE — 71046 X-RAY EXAM CHEST 2 VIEWS: CPT | Mod: 26,TXP,, | Performed by: RADIOLOGY

## 2023-06-13 PROCEDURE — 3008F BODY MASS INDEX DOCD: CPT | Mod: CPTII,TXP,, | Performed by: THORACIC SURGERY (CARDIOTHORACIC VASCULAR SURGERY)

## 2023-06-13 PROCEDURE — 3078F DIAST BP <80 MM HG: CPT | Mod: CPTII,TXP,, | Performed by: THORACIC SURGERY (CARDIOTHORACIC VASCULAR SURGERY)

## 2023-06-13 PROCEDURE — 99204 PR OFFICE/OUTPT VISIT, NEW, LEVL IV, 45-59 MIN: ICD-10-PCS | Mod: S$PBB,TXP,, | Performed by: THORACIC SURGERY (CARDIOTHORACIC VASCULAR SURGERY)

## 2023-06-13 PROCEDURE — 3075F SYST BP GE 130 - 139MM HG: CPT | Mod: CPTII,TXP,, | Performed by: THORACIC SURGERY (CARDIOTHORACIC VASCULAR SURGERY)

## 2023-06-13 PROCEDURE — 93880 EXTRACRANIAL BILAT STUDY: CPT | Mod: TC,TXP

## 2023-06-13 PROCEDURE — 99999 PR PBB SHADOW E&M-EST. PATIENT-LVL II: CPT | Mod: PBBFAC,TXP,, | Performed by: DIETITIAN, REGISTERED

## 2023-06-13 NOTE — PROGRESS NOTES
"Transplant Recipient Adult Psychosocial Assessment    Antonella Chase  [address] [facility] [place] [address]  Telephone Information:   Mobile [phone]   Home  [phone] (home)  Work  There is no work phone number on file.  E-mail  [email or web address]    Sex: female  YOB: 1960  Age: 62 y.o.    Encounter Date: 6/13/2023  [place] Citizen: yes  Primary Language: English   Needed: no    Emergency Contact:  Name: Bree Shin  Relationship:  sister in law  Address: [place], [place]  Phone Numbers: [phone] (cell)    Family/Social Support:   Number of dependents/: pt reports no young children/dependents at this time  Marital history: Pt is , but reports amicable/supportive relationship with her ex , Evan  Other family dynamics: Pt has four adult children: Dennis Pee ([place], ph: [phone]), Susi Almodovar ([place], ph: [phone]), Janice Haro ([place], ph: [phone]), Bradford Salvatore (in the , [place] but being sent to [place] soon, will be discharged once mom has surgery and will retire, ph: [phone]). Pt has a boyfriend, Faraz Walden, who pt reports she has been dating for 6 years.    Household Composition:  Name: Evan Chase  Relationship:  pt's ex   Does person drive? yes    Name: Antonella Chase  Age: 62  Relationship: patient  Does person drive? yes    Pt reports a strong, supportive relationship with her ex , Evan. 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 Pt reports she and ex  live in a 5 bedroom/4 bathroom home, with two women living with them right now.     Do you and your caregivers have access to reliable transportation? yes  PRIMARY CAREGIVER: Bree Shin (pt's sister in law, ph: [phone]) will be primary caregiver.     provided in-depth information to patient and " caregiver regarding pre- and post-transplant caregiver role.   strongly encourages patient and caregiver to have concrete plan regarding post-transplant care giving, including back-up caregiver(s) to ensure care giving needs are met as needed.    Patient and Caregiver states understanding all aspects of caregiver role/commitment and is able/willing/committed to being caregiver to the fullest extent necessary.    Patient and Caregiver verbalizes understanding of the education provided today and caregiver responsibilities.         remains available. Patient and Caregiver agree to contact  in a timely manner if concerns arise.      Able to take time off work without financial concerns:  n/a    Additional Significant Others who will Assist with Transplant:  Name: Alysha Arevalo (ph: [phone], does have condo on [address] in [place], [place])  City: [place] State: [place]  Relationship: sister  Does person drive? yes    Name: Mario Shin (ph: [phone])  City: [place] State: [place]  Relationship:  pt's brother  Does person drive? yes    Living Will: no  Healthcare Power of : yes Dennis Glasgow (pt's daughter, ph: [phone])  Advance Directives on file: <<no information> per medical record.  Verbally reviewed LW/HCPA information.   provided patient with copy of LW/HCPA documents and provided education on completion of forms.    Living Donors: N/A    Highest Education Level: Associate/Bachelor Degree  Reading Ability: college  Reports difficulty with: seeing  Learns Best By:  no preference stated     Status: no  VA Benefits: no     Working for Income: No  If no, reason not working: Demands of Treatment  Patient is disabled.  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    Spouse/Significant Other Employment: n/a    Disabled: yes: date disability began: October 2020, due to: IPF (idiopathic  "pulmonary fibrosis).    Monthly Income:  SS Disability: $1075/month  Pt reports she has "money from my divorce" in savings but did not disclose amount.  Able to afford all costs now and if transplanted, including medications: yes  Patient and Caregiver verbalizes understanding of personal responsibilities related to transplant costs and the importance of having a financial plan to ensure that patients transplant costs are fully covered.      provided fundraising information/education.  Patient and Caregiver verbalizes understanding.   remains available.    Insurance:   Payer/Plan Subscr  Sex Relation Sub. Ins. ID Effective Group Num   1. [employer] TIFFANIE* MIKE CASILLAS 1960 Female Self [id number]                                   [address] [address]   2. [place]* MIKE CASILLAS 1960 Female Self [id number]                                    [address] [address]       Primary Insurance (for UNOS reporting): Public Insurance - Medicare & Choice  Secondary Insurance (for UNOS reporting): Public Insurance - Medicaid    Pt reports she has no co pays for medications and qualifies for her insurance's dual special needs program (cost protected). In addition, pt reports she has an [employer] policy that reimburses her for hospital rooms, etc.    Patient and Caregiver verbalizes clear understanding that patient may experience difficulty obtaining and/or be denied insurance coverage post-surgery. This includes and is not limited to disability insurance, life insurance, health insurance, burial insurance, long term care insurance, and other insurances.    Patient and Caregiver also reports understanding that future health concerns related to or unrelated to transplantation may not be covered by patient's insurance.  Resources and information provided and reviewed.      Patient and Caregiver provides verbal permission to release any necessary information to outside resources for " "patient care and discharge planning.  Resources and information provided are reviewed.      Dialysis Adherence: Pt reports no hx of dialysis.     Infusion Service: patient utilizing? no  Home Health: patient utilizing? no  DME: yes oxygen supplies (portable oxygen concentrator, home concentrator) via [employer]  Pulmonary/Cardiac Rehab: Pt reports she has done pulmonary rehab in the past at [facility]   ADLS:  Pt reports independent with ADLS (such as walking, cooking, eating, bathing, housekeeping, and shopping). Pt reports she still drives. Pt reports she no longer takes baths, as this makes her feel too winded.    Adherence:   Pt reports high level of adherence to current health care regiment.  Adherence education and counseling provided. Pt verbalized understanding of importance of taking medications as instructed, following all team and MD recommendations, and coming to all follow up appointments.    Per History Section:  Past Medical History:   Diagnosis Date    Anal fissure     Ureña's esophagus     CAD (coronary artery disease)     COPD (chronic obstructive pulmonary disease)     Diverticulitis     Hiatal hernia     IPF (idiopathic pulmonary fibrosis)      Social History     Tobacco Use    Smoking status: Former     Packs/day: 1.50     Years: 30.00     Pack years: 45.00     Types: Cigarettes     Start date: 1976     Quit date: 10/5/2020     Years since quittin.6     Passive exposure: Past (occassionally at AA meetings people smoke, but no one in family)    Smokeless tobacco: Never    Tobacco comments:     23 - one cigarette a week and a half ago; otherwise quit since 2021   Substance Use Topics    Alcohol use: Not Currently     Comment: history of, last , would drink about a bottle of wine a day     Social History     Substance and Sexual Activity   Drug Use Not Currently    Types: Benzodiazepines, Cocaine, "Crack" cocaine    Comment: valium 'her mother used to give her it for " "cramps and travel'; detox 2016; rehab and currently a sponsor, active in AA     Social History     Substance and Sexual Activity   Sexual Activity Not Currently    Partners: Male       Per Today's Psychosocial:  Tobacco:  see above - former smoker, quit October 2020, smoked 1/5 ppd for 30 years  .  Alcohol:  see above - former alcohol use, last drink was 2016 .  Illicit Drugs/Non-prescribed Medications:  see above - former drug use, last drug use was 2016, pt did attend rehab and is active in AA .    Pt reports she has been "clean" from drugs and alcohol for 8 years. Pt reports a strong relationship with her sponsor and actively participates in AA meetings. Pt denies any urges to drink/smoke. 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 Pt reports healthy coping skills and strong family support.    Patient and Caregiver states clear understanding of the potential impact of substance use as it relates to transplant candidacy and is aware of possible random substance screening.  Substance abstinence/cessation counseling, education and resources provided and reviewed.     Arrests/DWI/Treatment/Rehab:  Pt reports she has undergone treatment for alcohol/drug use, the first time when she was 18 yo. Pt reports she was arrested in 1992. Pt states she was with her  at the time. He was arrested for possession and intent to distribute, and she was charged for the crimes as well.     Psychiatric History:  Mental Health: Pt denies any current anxiety/depression at this time. Pt reports "feeling anxious" during COVID 19 pandemic, but reports her anxiety has resolved.  Psychiatrist/Counselor: Pt reports she has been seeing her therapist, Alisha Morales, for 4 months now.  Medications:  Pt reports she was prescribed Ativan by her pulmonologist Dr. Rai, but she no longer takes it.   Suicide/Homicide Issues: Pt reports no hx of SI/HI and no current SI/HI.  Safety at home: " Pt reports no issues. No physical/emotional abuse reported.    Knowledge: Patient and Caregiver states having clear understanding and realistic expectations regarding the potential risks and potential benefits of organ transplantation and organ donation, agrees to discuss with health care team members and support system members, and to utilize available resources and express questions and/or concerns in order to further facilitate the pt informed decision-making.  Resources and information provided and reviewed.     Patient and Caregiver is aware of [facility]'s affiliation and/or partnership with agencies in home health care, LTAC, SNF, [facility], and other hospitals and clinics.    Understanding: Patient and Caregiver reports having a clear understanding of the many lifetime commitments involved with being a transplant recipient, including costs, compliance, medications, lab work, procedures, appointments, concrete and financial planning, preparedness, timely and appropriate communication of concerns, abstinence (ETOH, tobacco, illicit non-prescribed drugs), adherence to all health care team recommendations, support system and caregiver involvement, appropriate and timely resource utilization and follow-through, mental health counseling as needed/recommended, and patient and caregiver responsibilities.  Social Service Handbook, resources and detailed educational information provided and reviewed.  Educational information provided.    Patient and Caregiver also reports current and expected compliance with health care regime and states having a clear understanding of the importance of compliance.      Patient and Caregiver reports a clear understanding that risks and benefits may be involved with organ transplantation and with organ donation.      Patient and Caregiver also reports clear understanding that psychosocial risk factors may affect patient, and include but are not limited to feelings of depression, generalized  anxiety, anxiety regarding dependence on others, post traumatic stress disorder, feelings of guilt and other emotional and/or mental concerns, and/or exacerbation of existing mental health concerns.  Detailed resources provided and discussed.     Patient and Caregiver agrees to access appropriate resources in a timely manner as needed and/or as recommended, and to communicate concerns appropriately.  Patient and Caregiver also reports a clear understanding of treatment options available.      reviewed education, provided additional information, and answered questions.    Feelings or Concerns: Pt reports no overwhelming concerns or questions at this time. SW contact information provided.     Coping: Identify Patient & Caregiver Strategies to [place]:   1. In the past - jose alejandro, gardening, cooking, cross stitching, julia, coloring books, spending time outdoors/by the ocean, attending AA meetings/communication with her sponsor   2. Currently & Pre-transplant - jose alejandro, cooking, cross stitching, coloring books, spending time outdoors/by the ocean, attending AA meetings/communication with her sponsor   3. At the time of surgery - jose alejandro, cooking, cross stitching, coloring books, spending time outdoors/by the ocean, attending AA meetings/communication with her sponsor   4. During post-Transplant & Recovery Period - jose alejandro, cooking, cross stitching, coloring books, spending time outdoors/by the ocean, attending AA meetings/communication with her sponsor    Goals: Pt expresses desire to stay active and not depend on oxygen.  Patient referred to Vocational Rehabilitation.    Interview Behavior: Patient and Caregiver presents as alert and oriented x 4, pleasant, good eye contact, well groomed, recall good, concentration/judgement good, average intelligence, calm, communicative, cooperative, and asking and answering questions appropriately.     Transplant Social Work - Candidacy  Assessment/Plan:     Psychosocial  Suitability: Based on psychosocial risk factors, patient presents as medium risk candidate for lung transplant at this time due to history of substance use. Pt presents with the following protective factors: clear caregiver plan and good family support. Pt reports financial ability to afford transplant, reports compliance with current medical regimen, reports adequate functional status - currently independent with ADLs, no reported cognitive/developmental/behavioral impairments, mental health concerns are well managed, pt is activity participating in AA and has strong relationship with sponsor, and reports general understanding of txp process with adequate coping skills.    Recommendations/Additional Comments: SW recommends pt conduct fundraising to assist in the transplant process. SW recommends that pt remain aware of potential mental health concerns and contact the team if any concerns arise. SW recommends that pt remain abstinent from tobacco, ETOH and drug use. SW remains available to answer any questions or concerns that arise as the pt moves through the transplant process.     Final determination of transplant candidacy will be made once work up is complete and reviewed by the selection committee.      Daisy Thibodeaux LMSW

## 2023-06-13 NOTE — PROGRESS NOTES
Subjective:      Patient ID: Antonella Chase is a 62 y.o. female.     Chief Complaint: No chief complaint on file.    HPI:  Antonella Chase is a 62 y.o. female who presents as an initial consult for lung transplant candidacy secondary to IPF.  She has a medical history significant for CAD, COPD, IPF who is on 2-6 L of oxygen. She is on no assisted ventilation.  Her New York Heart Association Class is III and a Karnofsky score of 70% - Cares for self: Unable to carry on normal activity or active work. She is not diabetic. She reports that she has been on oxygen at night for one year and has been on it day and night for approximately 3 months.  She underwent an evaluation which revealed left lung 46.6, right lung 53.4. Her ejection fraction is 65%. Her blood group is A+.  She reports thoracotomy x 2 in her two (one right one left) after pneumothorax that reoccurred after chest tube removal.     Family and social history reviewed    Review of patient's allergies indicates:   Allergen Reactions    Codeine Nausea And Vomiting    Morphine Nausea And Vomiting     Past Medical History:   Diagnosis Date    Anal fissure     Ureña's esophagus     CAD (coronary artery disease)     COPD (chronic obstructive pulmonary disease)     Diverticulitis     Hiatal hernia     IPF (idiopathic pulmonary fibrosis)      Past Surgical History:   Procedure Laterality Date    COLECTOMY  2012    COLONOSCOPY  10/2019    most recent, has every three years    CORONARY ANGIOPLASTY WITH STENT PLACEMENT  12/08/2020    DILATION AND CURETTAGE OF UTERUS      x3 (miscarriages)    ESOPHAGEAL DILATION      EXCISION OF MELANOMA  2013    on back    HYSTERECTOMY  06/01/2020    SQUAMOUS CELL CARCINOMA EXCISION  2020    lip    THORACOTOMY Left 1991    THORACOTOMY Right 1993    spontaneous pneumo     Family History       Problem Relation (Age of Onset)    Alcohol abuse Father    Alzheimer's disease Brother    Bipolar disorder Sister    Breast cancer Sister    COPD  "Mother    Coronary artery disease Sister    Hashimoto's thyroiditis Daughter    Heart disease Mother    Hypertension Daughter    Kidney disease Father    Iqhm-Kjpfc-Zwzqvpo disease Sister (6)    No Known Problems Brother, Brother, Daughter    Other Mother, Daughter, Daughter    Pneumonia Daughter          Social History     Socioeconomic History    Marital status: Single   Tobacco Use    Smoking status: Former     Packs/day: 1.50     Years: 30.00     Pack years: 45.00     Types: Cigarettes     Start date: 1976     Quit date: 10/5/2020     Years since quittin.6     Passive exposure: Past (occassionally at  meetings people smoke, but no one in family)    Smokeless tobacco: Never    Tobacco comments:     23 - one cigarette a week and a half ago; otherwise quit since 2021   Substance and Sexual Activity    Alcohol use: Not Currently     Comment: history of, last , would drink about a bottle of wine a day    Drug use: Not Currently     Types: Benzodiazepines, Cocaine, "Crack" cocaine     Comment: valium 'her mother used to give her it for cramps and travel'; detox ; rehab and currently a sponsor, active in     Sexual activity: Not Currently     Partners: Male     Social Determinants of Health     Financial Resource Strain: Low Risk     Difficulty of Paying Living Expenses: Not very hard   Food Insecurity: No Food Insecurity    Worried About Running Out of Food in the Last Year: Never true    Ran Out of Food in the Last Year: Never true   Transportation Needs: No Transportation Needs    Lack of Transportation (Medical): No    Lack of Transportation (Non-Medical): No   Physical Activity: Insufficiently Active    Days of Exercise per Week: 3 days    Minutes of Exercise per Session: 20 min   Stress: No Stress Concern Present    Feeling of Stress : Only a little   Social Connections: Unknown    Frequency of Communication with Friends and Family: More than three times a week    Frequency of " Social Gatherings with Friends and Family: Three times a week    Active Member of Clubs or Organizations: Yes    Attends Club or Organization Meetings: More than 4 times per year    Marital Status:    Housing Stability: Low Risk     Unable to Pay for Housing in the Last Year: No    Number of Places Lived in the Last Year: 1    Unstable Housing in the Last Year: No       Current Outpatient Medications:     albuterol (ACCUNEB) 1.25 mg/3 mL Nebu, Take 3 mLs by nebulization every 6 (six) hours as needed., Disp: , Rfl:     albuterol (PROVENTIL) 2.5 mg /3 mL (0.083 %) nebulizer solution, Take 2.5 mg by nebulization every 4 (four) hours as needed., Disp: , Rfl:     albuterol (PROVENTIL/VENTOLIN HFA) 90 mcg/actuation inhaler, Inhale 2 puffs into the lungs every 4 (four) hours as needed., Disp: , Rfl:     aspirin (ECOTRIN) 81 MG EC tablet, Take 81 mg by mouth Daily., Disp: , Rfl:     BREZTRI AEROSPHERE 160-9-4.8 mcg/actuation HFAA, Inhale 2 puffs into the lungs 2 (two) times a day., Disp: , Rfl:     doxycycline (VIBRAMYCIN) 100 MG Cap, Take 1 capsule by mouth 2 (two) times a day., Disp: , Rfl:     famotidine (PEPCID) 40 MG tablet, Take 40 mg by mouth every evening., Disp: , Rfl:     hydrOXYzine HCL (ATARAX) 25 MG tablet, Take 25 mg by mouth nightly as needed. For hives, Disp: , Rfl:     ibuprofen (ADVIL,MOTRIN) 600 MG tablet, Take 600 mg by mouth 4 (four) times daily. Prn  / sparingly due to diverticulitis, Disp: , Rfl:     ipratropium (ATROVENT) 0.02 % nebulizer solution, Take by nebulization every 4 (four) hours as needed., Disp: , Rfl:     LINZESS 72 mcg Cap capsule, Take 72 mcg by mouth daily as needed., Disp: , Rfl:     naloxone (NARCAN) 4 mg/actuation Spry, USE 1 SPRAY in nose ONCE. MAY REPEAT every 2 TO 3 minutes UNTIL patient responds, Disp: , Rfl:     NEXLIZET 180-10 mg Tab, Take 1 tablet by mouth once daily., Disp: , Rfl:     nystatin (MYCOSTATIN) powder, TO THE AFFECTED AREA(S) TWICE DAILY, Disp: , Rfl:      nystatin-triamcinolone (MYCOLOG) ointment, TO THE AFFECTED AREA(S) TWICE DAILY, Disp: , Rfl:     OFEV 150 mg Cap, Take 150 mg by mouth 2 (two) times a day., Disp: , Rfl:     pantoprazole (PROTONIX) 40 MG tablet, Take 40 mg by mouth once daily., Disp: , Rfl:     predniSONE (DELTASONE) 20 MG tablet, Take 2 tablets by mouth once daily., Disp: , Rfl:     sucralfate (CARAFATE) 1 gram tablet, Take 1 g by mouth 4 (four) times daily., Disp: , Rfl:     traMADoL (ULTRAM) 50 mg tablet, Take 50 mg by mouth every 12 (twelve) hours as needed., Disp: , Rfl:     tretinoin (RETIN-A) 0.05 % cream, apply a SMALL AMOUNT TO acne prone face nightly, Disp: , Rfl:     Current Facility-Administered Medications:     sodium chloride 0.9% flush 10 mL, 10 mL, Intravenous, PRN, Polo Sanon MD  Current medications Reviewed    Review of Systems   Constitutional:  Positive for activity change and fatigue. Negative for appetite change and fever.   HENT:  Negative for nosebleeds.    Respiratory:  Positive for shortness of breath. Negative for cough.    Cardiovascular:  Negative for chest pain, palpitations and leg swelling.   Gastrointestinal:  Negative for abdominal distention, abdominal pain and nausea.   Genitourinary:  Negative for frequency.   Musculoskeletal:  Negative for arthralgias and myalgias.   Skin:  Negative for rash.   Neurological:  Negative for dizziness and numbness.   Hematological:  Does not bruise/bleed easily.   Objective:   Physical Exam  HENT:      Head: Normocephalic and atraumatic.   Eyes:      Extraocular Movements: Extraocular movements intact.   Cardiovascular:      Rate and Rhythm: Normal rate and regular rhythm.   Pulmonary:      Effort: Pulmonary effort is normal.      Comments: On oxygen  Abdominal:      General: Abdomen is flat.      Palpations: Abdomen is soft.   Musculoskeletal:         General: Normal range of motion.      Cervical back: Normal range of motion.   Skin:     General: Skin is warm and dry.       Capillary Refill: Capillary refill takes less than 2 seconds.   Neurological:      General: No focal deficit present.       Diagnostic Results: Reviewed   ECHO:  The left ventricle is normal in size with normal systolic function.  The estimated ejection fraction is 65%.  Normal left ventricular diastolic function.  Normal right ventricular size with normal right ventricular systolic function.  Normal central venous pressure (3 mmHg).    CT Chest:  Impression:  No significant interval change since prior CT of the chest 02/09/2023.  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    Nuc Med Split:  Left lung  Left upper lung zone: 5.8 %  Left mid lung zone: 25.5 %  Left lower lung zone: 15.3 %  Left lung total: 46.6 %     Right lung  Right upper lung zone: 5.1 %  Right mid lung zone: 28.9 %  Right lower lung zone: 19.2 %  Right lung total: 53.4 %    Blood group: A+  Assessment:   IPF  Lung Transplant Candidate  Plan:     CTS Attending Note:    I have personally taken the history and examined this patient and agree with the ESCOBAR's note as stated above.  Very pleasant 62-year-old woman with IPF and emphysema.  She has had bilateral thoracotomies early in life for refractory pneumothoraces  On the left and a large bulla on the right. it does not sound as though a pleurodesis was ever done on either side.  Her functional status remains good although her oxygen requirement has increased.  She appears to be a good candidate for transplantation, although her recent bacteremia from diverticulitis is concerning in light of anticipated immunosuppression.  We will plan to discuss at selection.

## 2023-06-13 NOTE — PROGRESS NOTES
TRANSPLANT NUTRITIONAL ASSESSMENT    Referring Provider: Syeda Arango DO    Reason for Visit: Pre-lung transplant work-up    Age: 62 y.o.  Sex: female    Patient Active Problem List   Diagnosis    IPF (idiopathic pulmonary fibrosis)    Chronic obstructive pulmonary disease    Lung transplant candidate    Pulmonary emphysema with fibrosis of lung     Past Medical History:   Diagnosis Date    Anal fissure     Ureña's esophagus     CAD (coronary artery disease)     COPD (chronic obstructive pulmonary disease)     Diverticulitis     Hiatal hernia     IPF (idiopathic pulmonary fibrosis)      Lab Results   Component Value Date     (H) 06/12/2023    K 3.9 06/12/2023    MG 1.7 06/12/2023    CHOL 186 06/12/2023    HDL 59 06/12/2023    TRIG 120 06/12/2023    ALBUMIN 3.8 06/12/2023    HGBA1C 6.5 (H) 06/12/2023    CALCIUM 9.7 06/12/2023     Other Pertinent Labs: none    Current Outpatient Medications   Medication Sig    albuterol (ACCUNEB) 1.25 mg/3 mL Nebu Take 3 mLs by nebulization every 6 (six) hours as needed.    albuterol (PROVENTIL) 2.5 mg /3 mL (0.083 %) nebulizer solution Take 2.5 mg by nebulization every 4 (four) hours as needed.    albuterol (PROVENTIL/VENTOLIN HFA) 90 mcg/actuation inhaler Inhale 2 puffs into the lungs every 4 (four) hours as needed.    aspirin (ECOTRIN) 81 MG EC tablet Take 81 mg by mouth Daily.    BREZTRI AEROSPHERE 160-9-4.8 mcg/actuation HFAA Inhale 2 puffs into the lungs 2 (two) times a day.    doxycycline (VIBRAMYCIN) 100 MG Cap Take 1 capsule by mouth 2 (two) times a day.    famotidine (PEPCID) 40 MG tablet Take 40 mg by mouth every evening.    hydrOXYzine HCL (ATARAX) 25 MG tablet Take 25 mg by mouth nightly as needed. For hives    ibuprofen (ADVIL,MOTRIN) 600 MG tablet Take 600 mg by mouth 4 (four) times daily. Prn  / sparingly due to diverticulitis    ipratropium (ATROVENT) 0.02 % nebulizer solution Take by nebulization every 4 (four) hours as needed.    LINZESS 72 mcg Cap  "capsule Take 72 mcg by mouth daily as needed.    naloxone (NARCAN) 4 mg/actuation Spry USE 1 SPRAY in nose ONCE. MAY REPEAT every 2 TO 3 minutes UNTIL patient responds    NEXLIZET 180-10 mg Tab Take 1 tablet by mouth once daily.    nystatin (MYCOSTATIN) powder TO THE AFFECTED AREA(S) TWICE DAILY    nystatin-triamcinolone (MYCOLOG) ointment TO THE AFFECTED AREA(S) TWICE DAILY    OFEV 150 mg Cap Take 150 mg by mouth 2 (two) times a day.    pantoprazole (PROTONIX) 40 MG tablet Take 40 mg by mouth once daily.    predniSONE (DELTASONE) 20 MG tablet Take 2 tablets by mouth once daily.    sucralfate (CARAFATE) 1 gram tablet Take 1 g by mouth 4 (four) times daily.    traMADoL (ULTRAM) 50 mg tablet Take 50 mg by mouth every 12 (twelve) hours as needed.    tretinoin (RETIN-A) 0.05 % cream apply a SMALL AMOUNT TO acne prone face nightly     Current Facility-Administered Medications   Medication    sodium chloride 0.9% flush 10 mL     Allergies: Codeine and Morphine    Ht Readings from Last 1 Encounters:   06/13/23 5' 2.76" (1.594 m)     Wt Readings from Last 1 Encounters:   06/13/23 63 kg (139 lb)      BMI: Body mass index is 24.83 kg/m².    Usual Weight: 145 lb  Weight Change/Time: generally stable lately, had E. Coli a month ago or so and lost weight to 127 lb, feeling better  Current Diet: heart healthy  Appetite/Current Intake: fair   Exercise/Physical Activity: uses oxygen for mobility and rest, keeps body moving/keeps busy, not sedentary  Nutritional/Herbal Supplements: homemade protein shake w/ whey protein powder + collagen + fruit  Potential Food/Medication Interactions: reviewed  Chewing/Swallowing Problems: none  Symptoms: nausea  Assessment of Lab Values: Glu 118, Ha1c 6.5  Support System: family present, pt states she can shop and cook, children are supportive and cook heart healthy meals    Estimated Kcal Need: 1171-2892 kcal (25-30 kcal/kg)  Estimated Protein Need: 63-75 gm (1-1.2 gm/kg)    Nutritional History: "   Pt reports to have a good/fair appetite, nausea in the morning. She attributes nausea to Ofev. Antonella has an inconsistent appetite, she feels full quickly. She avoids higher fat meats, highly processed foods. She is not adding table salt to her food, she uses some salt in cooking. She uses nearly all fresh vegetables, garden, and fresh fruit. Diet recall was as follows:  B: skip, coffee w/ creamer & splenda  Snack: 10a/11a; eggs, Eligio bread w/ avocado, home grown tomatoes   L / D: grilled white meat chicken/ fish / medium-rare steak, roasted potatoes, zucchini, spinach, rutabaga, cabbage, lima beans, homemade hummus, baked carrots, homemade tomato soup  Snack: wheat thins, cheese-puffs occasionally, yogurt, cheese slices, homemade pimento cheese w/ homemade roasted red peppers, homemade dark cocoa powder pudding, M&M's sometimes,   Homemade smoothie: protein powder 1 scoop, collagen, banana, apple juice + other various fruit  Beverages: water with SF flavoring, diet sprite, coke zero      Nutritional Diagnoses  Problem: food- and nutrition-related knowledge deficit  Etiology: r/ t no prior edu on pre lung tx nutrition recommendations  Symptoms: aeb diet recall, questions from pt    Educational Need? yes  Barriers: none identified  Discussed with: patient and caregiver  Interventions: Patient taught nutrition information regarding Pre-lung transplant work-up  Pre lung transplant nutrition education packet discussed and provided. Information includes recommendations on sodium intake, protein intake, weight maintenance/BMI guidance.  Encouraged physical activity daily, regular exercise as tolerated, stay mobile.  Advised to maintain weight.     Goals/Recommendations: diet adherence, small frequent meals and snacks, and consumption of jami nutritional supplements  Actions Taken: instruct/provide written information  Strategies Used: problem solving, goal setting, motivational interviewing  Patient and/or family  comprehend instructions: yes , adherence expected  Outcome: Verbalizes understanding  Monitoring: Contact information provided, will f/u in clinic and communicate with the care team as needed.     Counseling Time: 30 minutes

## 2023-06-14 ENCOUNTER — EDUCATION (OUTPATIENT)
Dept: CARDIOLOGY | Facility: CLINIC | Age: 63
End: 2023-06-14
Payer: MEDICARE

## 2023-06-14 ENCOUNTER — OFFICE VISIT (OUTPATIENT)
Dept: CARDIOLOGY | Facility: CLINIC | Age: 63
End: 2023-06-14
Payer: MEDICARE

## 2023-06-14 ENCOUNTER — HOSPITAL ENCOUNTER (OUTPATIENT)
Dept: RADIOLOGY | Facility: HOSPITAL | Age: 63
Discharge: HOME | End: 2023-06-14
Attending: INTERNAL MEDICINE
Payer: MEDICARE

## 2023-06-14 VITALS
HEIGHT: 63 IN | HEART RATE: 88 BPM | DIASTOLIC BLOOD PRESSURE: 73 MMHG | BODY MASS INDEX: 24.88 KG/M2 | OXYGEN SATURATION: 96 % | SYSTOLIC BLOOD PRESSURE: 117 MMHG | WEIGHT: 140.44 LBS

## 2023-06-14 DIAGNOSIS — J84.10 PULMONARY EMPHYSEMA WITH FIBROSIS OF LUNG: ICD-10-CM

## 2023-06-14 DIAGNOSIS — Z76.82 LUNG TRANSPLANT CANDIDATE: ICD-10-CM

## 2023-06-14 DIAGNOSIS — J43.9 PULMONARY EMPHYSEMA WITH FIBROSIS OF LUNG: ICD-10-CM

## 2023-06-14 DIAGNOSIS — J44.9 CHRONIC OBSTRUCTIVE PULMONARY DISEASE, UNSPECIFIED COPD TYPE: Primary | ICD-10-CM

## 2023-06-14 DIAGNOSIS — J84.112 IPF (IDIOPATHIC PULMONARY FIBROSIS): ICD-10-CM

## 2023-06-14 DIAGNOSIS — K21.9 GASTROESOPHAGEAL REFLUX DISEASE, UNSPECIFIED WHETHER ESOPHAGITIS PRESENT: ICD-10-CM

## 2023-06-14 DIAGNOSIS — I25.10 CORONARY ARTERY DISEASE INVOLVING NATIVE CORONARY ARTERY OF NATIVE HEART WITHOUT ANGINA PECTORIS: ICD-10-CM

## 2023-06-14 PROCEDURE — 1159F MED LIST DOCD IN RCRD: CPT | Mod: CPTII,TXP,, | Performed by: INTERNAL MEDICINE

## 2023-06-14 PROCEDURE — 3078F PR MOST RECENT DIASTOLIC BLOOD PRESSURE < 80 MM HG: ICD-10-PCS | Mod: CPTII,TXP,, | Performed by: INTERNAL MEDICINE

## 2023-06-14 PROCEDURE — 99999 PR PBB SHADOW E&M-EST. PATIENT-LVL IV: ICD-10-PCS | Mod: PBBFAC,TXP,, | Performed by: INTERNAL MEDICINE

## 2023-06-14 PROCEDURE — 3078F DIAST BP <80 MM HG: CPT | Mod: CPTII,TXP,, | Performed by: INTERNAL MEDICINE

## 2023-06-14 PROCEDURE — 25500020 PHARM REV CODE 255: Mod: TXP | Performed by: INTERNAL MEDICINE

## 2023-06-14 PROCEDURE — 3074F PR MOST RECENT SYSTOLIC BLOOD PRESSURE < 130 MM HG: ICD-10-PCS | Mod: CPTII,TXP,, | Performed by: INTERNAL MEDICINE

## 2023-06-14 PROCEDURE — 99205 PR OFFICE/OUTPT VISIT, NEW, LEVL V, 60-74 MIN: ICD-10-PCS | Mod: S$PBB,TXP,, | Performed by: INTERNAL MEDICINE

## 2023-06-14 PROCEDURE — 3008F PR BODY MASS INDEX (BMI) DOCUMENTED: ICD-10-PCS | Mod: CPTII,TXP,, | Performed by: INTERNAL MEDICINE

## 2023-06-14 PROCEDURE — 74220 FL ESOPHAGRAM COMPLETE: ICD-10-PCS | Mod: 26,TXP,, | Performed by: INTERNAL MEDICINE

## 2023-06-14 PROCEDURE — 3044F HG A1C LEVEL LT 7.0%: CPT | Mod: CPTII,TXP,, | Performed by: INTERNAL MEDICINE

## 2023-06-14 PROCEDURE — 3074F SYST BP LT 130 MM HG: CPT | Mod: CPTII,TXP,, | Performed by: INTERNAL MEDICINE

## 2023-06-14 PROCEDURE — 99999 PR PBB SHADOW E&M-EST. PATIENT-LVL IV: CPT | Mod: PBBFAC,TXP,, | Performed by: INTERNAL MEDICINE

## 2023-06-14 PROCEDURE — 74220 X-RAY XM ESOPHAGUS 1CNTRST: CPT | Mod: TC,TXP

## 2023-06-14 PROCEDURE — 3044F PR MOST RECENT HEMOGLOBIN A1C LEVEL <7.0%: ICD-10-PCS | Mod: CPTII,TXP,, | Performed by: INTERNAL MEDICINE

## 2023-06-14 PROCEDURE — A9698 NON-RAD CONTRAST MATERIALNOC: HCPCS | Mod: TXP | Performed by: INTERNAL MEDICINE

## 2023-06-14 PROCEDURE — 1159F PR MEDICATION LIST DOCUMENTED IN MEDICAL RECORD: ICD-10-PCS | Mod: CPTII,TXP,, | Performed by: INTERNAL MEDICINE

## 2023-06-14 PROCEDURE — 99205 OFFICE O/P NEW HI 60 MIN: CPT | Mod: S$PBB,TXP,, | Performed by: INTERNAL MEDICINE

## 2023-06-14 PROCEDURE — 3008F BODY MASS INDEX DOCD: CPT | Mod: CPTII,TXP,, | Performed by: INTERNAL MEDICINE

## 2023-06-14 PROCEDURE — 74220 X-RAY XM ESOPHAGUS 1CNTRST: CPT | Mod: 26,TXP,, | Performed by: INTERNAL MEDICINE

## 2023-06-14 RX ADMIN — BARIUM SULFATE 250 ML: 0.6 SUSPENSION ORAL at 09:06

## 2023-06-14 NOTE — PROGRESS NOTES
"Referring provider: Dr. Syeda Arango     Patient ID:  Antonella Chase is a 62 y.o. y.o. female who presents for evaluation No chief complaint on file.      Antonella Chase is a 63 yo F with COPD, ILD and CAD who presents for evaluation of LHC/RHC prior to possible lung transplant. She has a history of CAD and previously had RCA PCI in the past. She denies angina but does have SOB which she attributes to lung disease. Denies le edema, abd distention, PND or orthopnea. Was recently seen by Dr. Garcia and RHC/LHC requested as part of workup for double lung transplant.     Review of Systems   Respiratory:  Positive for shortness of breath.    All other systems reviewed and are negative.     Objective:     /73 (BP Location: Left arm, Patient Position: Sitting, BP Method: Large (Automatic))   Pulse 88   Ht 5' 2.76" (1.594 m)   Wt 63.7 kg (140 lb 6.9 oz)   SpO2 96% Comment: pt on 5L of O2 NC  BMI 25.07 kg/m²     Physical Exam  Vitals and nursing note reviewed.   Constitutional:       Appearance: Normal appearance.   HENT:      Head: Normocephalic and atraumatic.      Right Ear: External ear normal.      Left Ear: External ear normal.      Nose: Nose normal.      Mouth/Throat:      Mouth: Mucous membranes are moist.   Eyes:      Extraocular Movements: Extraocular movements intact.      Pupils: Pupils are equal, round, and reactive to light.   Cardiovascular:      Rate and Rhythm: Normal rate and regular rhythm.      Pulses: Normal pulses.   Pulmonary:      Effort: Pulmonary effort is normal.   Abdominal:      General: Abdomen is flat.   Musculoskeletal:         General: Normal range of motion.      Cervical back: Normal range of motion.      Right lower leg: No edema.      Left lower leg: No edema.   Skin:     General: Skin is warm and dry.      Capillary Refill: Capillary refill takes less than 2 seconds.   Neurological:      General: No focal deficit present.      Mental Status: She is alert and oriented " to person, place, and time. Mental status is at baseline.     Labs:     Lab Results   Component Value Date     06/12/2023    K 3.9 06/12/2023     06/12/2023    CO2 27 06/12/2023    BUN 8 06/12/2023    CREATININE 0.8 06/12/2023    GLUCOSE Negative 04/22/2023    GLUCOSE 106 (H) 09/27/2022    ANIONGAP 8 06/12/2023     Lab Results   Component Value Date    HGBA1C 6.5 (H) 06/12/2023     Lab Results   Component Value Date    BNP 37 06/12/2023    BNP 16.7 02/19/2021       Lab Results   Component Value Date    WBC 10.38 06/12/2023    HGB 14.0 06/12/2023    HCT 43.4 06/12/2023     06/12/2023    GRAN 7.4 06/12/2023    GRAN 71.5 06/12/2023     Lab Results   Component Value Date    CHOL 186 06/12/2023    HDL 59 06/12/2023    LDLCALC 103.0 06/12/2023    TRIG 120 06/12/2023       Meds:     Current Outpatient Medications:     albuterol (ACCUNEB) 1.25 mg/3 mL Nebu, Take 3 mLs by nebulization every 6 (six) hours as needed., Disp: , Rfl:     albuterol (PROVENTIL) 2.5 mg /3 mL (0.083 %) nebulizer solution, Take 2.5 mg by nebulization every 4 (four) hours as needed., Disp: , Rfl:     albuterol (PROVENTIL/VENTOLIN HFA) 90 mcg/actuation inhaler, Inhale 2 puffs into the lungs every 4 (four) hours as needed., Disp: , Rfl:     aspirin (ECOTRIN) 81 MG EC tablet, Take 81 mg by mouth Daily., Disp: , Rfl:     BREZTRI AEROSPHERE 160-9-4.8 mcg/actuation HFAA, Inhale 2 puffs into the lungs 2 (two) times a day., Disp: , Rfl:     famotidine (PEPCID) 40 MG tablet, Take 40 mg by mouth every evening., Disp: , Rfl:     hydrOXYzine HCL (ATARAX) 25 MG tablet, Take 25 mg by mouth nightly as needed. For hives, Disp: , Rfl:     ibuprofen (ADVIL,MOTRIN) 600 MG tablet, Take 600 mg by mouth 4 (four) times daily. Prn  / sparingly due to diverticulitis, Disp: , Rfl:     ipratropium (ATROVENT) 0.02 % nebulizer solution, Take by nebulization every 4 (four) hours as needed., Disp: , Rfl:     LINZESS 72 mcg Cap capsule, Take 72 mcg by  mouth daily as needed., Disp: , Rfl:     naloxone (NARCAN) 4 mg/actuation Spry, USE 1 SPRAY in nose ONCE. MAY REPEAT every 2 TO 3 minutes UNTIL patient responds, Disp: , Rfl:     NEXLIZET 180-10 mg Tab, Take 1 tablet by mouth once daily., Disp: , Rfl:     nystatin (MYCOSTATIN) powder, TO THE AFFECTED AREA(S) TWICE DAILY, Disp: , Rfl:     nystatin-triamcinolone (MYCOLOG) ointment, TO THE AFFECTED AREA(S) TWICE DAILY, Disp: , Rfl:     OFEV 150 mg Cap, Take 150 mg by mouth 2 (two) times a day., Disp: , Rfl:     pantoprazole (PROTONIX) 40 MG tablet, Take 40 mg by mouth once daily., Disp: , Rfl:     predniSONE (DELTASONE) 20 MG tablet, Take 2 tablets by mouth once daily., Disp: , Rfl:     sucralfate (CARAFATE) 1 gram tablet, Take 1 g by mouth 4 (four) times daily., Disp: , Rfl:     traMADoL (ULTRAM) 50 mg tablet, Take 50 mg by mouth every 12 (twelve) hours as needed., Disp: , Rfl:     tretinoin (RETIN-A) 0.05 % cream, apply a SMALL AMOUNT TO acne prone face nightly, Disp: , Rfl:     varicella-zoster [id number], PF, (SHINGRIX) 50 mcg/0.5 mL injection, Inject into the muscle., Disp: 1 each, Rfl: 1    doxycycline (VIBRAMYCIN) 100 MG Cap, Take 1 capsule by mouth 2 (two) times a day., Disp: , Rfl:     Current Facility-Administered Medications:     sodium chloride 0.9% flush 10 mL, 10 mL, Intravenous, PRN, Polo Sanon MD      Assessment & Plan:     CAD (coronary artery disease)  Prior PCI to RCA and last angiogram with negative iFR to RCA. Will plan on diagnostic LHC/RHC as part of pre-lung transplant evaulation.     --diagnostic LHC/RHC  - Anti-platelet Therapy: ASA   - Access: Right radial, right brachial  - Catheters: Cornelius, 5F wedge catheter   - Creatinine/CrCl: 0.8  - Allergies: No shellfish / Iodine allergy  - Pre-Hydration: NS  - Pre-Op Med: Bendaryl 50mg pO   - All patient's questions were answered.  -The risks, benefits and alternatives of the procedure were explained to the patient.   -The risks of  coronary angiography include but are not limited to: bleeding, infection, heart rhythm abnormalities, allergic reactions, kidney injury and potential need for dialysis, stroke and death.   - Should stenting be indicated, the patient has agreed to dual anti-platelet therapy for 1-consecutive year with a drug-eluting stent and a minimum of 1-month with the use of a bare metal stent  - Additionally, pt is aware that non-compliance is likely to result in stent clotting with heart attack, heart failure, and/or death  -The risks of moderate sedation include hypotension, respiratory depression, arrhythmias, bronchospasm, and death.   - Informed consent was obtained and the  patient is agreeable to proceed with the procedure.      Lung transplant candidate  -pending RHC and committee meeting     Chronic obstructive pulmonary disease  -pending possible transplant     IPF (idiopathic pulmonary fibrosis)  -pending possible lung transplant

## 2023-06-14 NOTE — ASSESSMENT & PLAN NOTE
Prior PCI to RCA and last angiogram with negative iFR to RCA. Will plan on diagnostic LHC/RHC as part of pre-lung transplant evaulation.     --diagnostic LHC/RHC  - Anti-platelet Therapy: ASA   - Access: Right radial, right brachial  - Catheters: Cornelius, 5F wedge catheter   - Creatinine/CrCl: 0.8  - Allergies: No shellfish / Iodine allergy  - Pre-Hydration: NS  - Pre-Op Med: Bendaryl 50mg pO   - All patient's questions were answered.  -The risks, benefits and alternatives of the procedure were explained to the patient.   -The risks of coronary angiography include but are not limited to: bleeding, infection, heart rhythm abnormalities, allergic reactions, kidney injury and potential need for dialysis, stroke and death.   - Should stenting be indicated, the patient has agreed to dual anti-platelet therapy for 1-consecutive year with a drug-eluting stent and a minimum of 1-month with the use of a bare metal stent  - Additionally, pt is aware that non-compliance is likely to result in stent clotting with heart attack, heart failure, and/or death  -The risks of moderate sedation include hypotension, respiratory depression, arrhythmias, bronchospasm, and death.   - Informed consent was obtained and the  patient is agreeable to proceed with the procedure.

## 2023-06-14 NOTE — PROGRESS NOTES
OUTPATIENT CATHETERIZATION INSTRUCTIONS    You have been scheduled for a procedure in the catheterization lab on Friday, June 23, 2023.     Please report to the Cardiology Waiting Area on the Third floor of the hospital and check in at 7 AM.   You will then be taken to the SSCU (Short Stay Cardiac Unit) and prepared for your procedure. Please be aware that this is not the time of your procedure but the time you are to arrive. The procedures are scheduled on an hourly basis; however, emergency cases take precedence over all other cases.       No solid foods 8 hours prior to your procedure.  You may have clear liquids until the time of your admission which should be 2 hours prior to your procedure.  You are encouraged to drink at least 8 ounces of clear liquids prior to your admission to SSCU.  Patients with gastric emptying issues should be fasting for 6- 8 hours prior to the procedure.  Clear liquids include water, black coffee, clear juices, and performance drinks - no pulp or milk.    Heart failure or dialysis patients will be limited to 8 ounces (1 cup) of clear liquids up until 2 hours of the procedure.    3.   You may take your regular morning medications with water. If there are any medications that you should not take you will be instructed to hold them that morning. If you         are diabetic and on Metformin (Glucophage) do not take it the day before, the day of, and for 2 days after your procedure.  4.   If you are on Pradaxa, Eliquis or Coumadin , you can hold them 3 days prior to your procedure.      The procedure will take 1-2 hours to perform. After the procedure, you will return to SSCU on the third floor of the hospital. You will need to lie still (or keep your arm still) for the next 2 to 4 hours to minimize bleeding from the puncture site.  This time is determined by your physician.  Your family may remain in the room with you during this time.       You may be able to be discharged home that same  "afternoon if there is someone to drive you home and there are no complications.  Your doctor will determine, based on your progress, the date and time of your discharge. The results of your procedure will be discussed with you before you are discharged. Any further testing or procedures will be scheduled for you either before you leave or after your discharge..     Special Instructions:  Stay on all medications      If you should have any questions, concerns, or need to change the date of your procedure, please call HENRIQUE Stone @ [phone]",                    THE ABOVE INSTRUCTIONS WERE GIVEN TO THE PATIENT VERBALLY AND THEY VERBALIZED UNDERSTANDING.  THEY DO NOT REQUIRE ANY SPECIAL NEEDS AND DO NOT HAVE ANY LEARNING BARRIERS.          Directions for Reporting to Cardiology Waiting Area in the Hospital  If you park in the Parking Garage:  Take elevators to the1st floor of the parking garage.  Continue past the gift shop, coffee shop, and piano.  Take a right and go to the gold elevators. (Elevator B)  Take the elevator to the 3rd floor.  Follow the arrow on the sign on the wall that says Cath Lab Registration/EP Lab Registration.  Follow the long hallway all the way around until you come to a big open area.  This is the registration area.  Check in at Reception Desk.    OR    If family is dropping you off:  Have them drop you off at the front of the Hospital under the green overhang.  Enter through the doors and take a right.  Take the E elevators to the 3rd floor Cardiology Waiting Area.  Check in at the Reception Desk in the waiting room.             "

## 2023-06-16 ENCOUNTER — TELEPHONE (OUTPATIENT)
Dept: TRANSPLANT | Facility: CLINIC | Age: 63
End: 2023-06-16
Payer: MEDICARE

## 2023-06-16 NOTE — TELEPHONE ENCOUNTER
RUCHI contacted pt via telephone to discuss fundraising. Pt presents via telephone AAOx4, calm, communicative, and in good spirits. RUCHI explained she would be mailing fundraising letter stating that pt is awaiting listing for lung transplant at [facility]. RUCHI explained letter outlines pt's recovery timeline, expected costs associated with transplant, and caregiver requirements. RUCHI reminded pt that all pts are highly encouraged to fundraise to help offset expenses associated with transplant. RUCHI also reminded pt even with adequate insurance coverage, pts can still incur miscellaneous expenses, such as cost of gas/food/medication coverage.     Pt reports she will start using the transportation and lodging benefits that her insurance plan provides. In addition, pt has an hospital indemnity plan with [employer] and a specified illness policy through [employer]. Pt states she purchased the specified illness policy years ago and if she is placed on the [facility] transplant waiting list, she will receive $20,000.     Pt verbalized understanding with all information discussed. Pt asked questions, which were answered to his satisfaction. RUCHI provided pt with contact information for RUCHI (ph: [phone]) and encouraged pt to contact RUCHI with any future needs/concerns/questions. Pt verbalized understanding. RUCHI remains available to provide psychosocial support, education, and resources as appropriate.

## 2023-06-21 ENCOUNTER — TELEPHONE (OUTPATIENT)
Dept: PALLIATIVE MEDICINE | Facility: CLINIC | Age: 63
End: 2023-06-21
Payer: MEDICARE

## 2023-06-22 ENCOUNTER — OFFICE VISIT (OUTPATIENT)
Dept: PALLIATIVE MEDICINE | Facility: CLINIC | Age: 63
End: 2023-06-22
Payer: MEDICARE

## 2023-06-22 VITALS
HEART RATE: 77 BPM | DIASTOLIC BLOOD PRESSURE: 64 MMHG | WEIGHT: 140.88 LBS | BODY MASS INDEX: 25.92 KG/M2 | OXYGEN SATURATION: 99 % | HEIGHT: 62 IN | SYSTOLIC BLOOD PRESSURE: 148 MMHG

## 2023-06-22 DIAGNOSIS — J43.9 PULMONARY EMPHYSEMA WITH FIBROSIS OF LUNG: ICD-10-CM

## 2023-06-22 DIAGNOSIS — J84.10 PULMONARY EMPHYSEMA WITH FIBROSIS OF LUNG: ICD-10-CM

## 2023-06-22 DIAGNOSIS — Z51.5 PALLIATIVE CARE BY SPECIALIST: ICD-10-CM

## 2023-06-22 DIAGNOSIS — J84.112 IPF (IDIOPATHIC PULMONARY FIBROSIS): Primary | ICD-10-CM

## 2023-06-22 DIAGNOSIS — Z76.82 LUNG TRANSPLANT CANDIDATE: ICD-10-CM

## 2023-06-22 DIAGNOSIS — R06.09 DYSPNEA ON EXERTION: ICD-10-CM

## 2023-06-22 PROCEDURE — 1159F MED LIST DOCD IN RCRD: CPT | Mod: CPTII,NTX,S$GLB, | Performed by: INTERNAL MEDICINE

## 2023-06-22 PROCEDURE — 1159F PR MEDICATION LIST DOCUMENTED IN MEDICAL RECORD: ICD-10-PCS | Mod: CPTII,NTX,S$GLB, | Performed by: INTERNAL MEDICINE

## 2023-06-22 PROCEDURE — 3008F PR BODY MASS INDEX (BMI) DOCUMENTED: ICD-10-PCS | Mod: CPTII,NTX,S$GLB, | Performed by: INTERNAL MEDICINE

## 2023-06-22 PROCEDURE — 99999 PR PBB SHADOW E&M-EST. PATIENT-LVL V: CPT | Mod: PBBFAC,TXP,, | Performed by: INTERNAL MEDICINE

## 2023-06-22 PROCEDURE — 99999 PR PBB SHADOW E&M-EST. PATIENT-LVL V: ICD-10-PCS | Mod: PBBFAC,TXP,, | Performed by: INTERNAL MEDICINE

## 2023-06-22 PROCEDURE — 1160F RVW MEDS BY RX/DR IN RCRD: CPT | Mod: CPTII,NTX,S$GLB, | Performed by: INTERNAL MEDICINE

## 2023-06-22 PROCEDURE — 99497 ADVNCD CARE PLAN 30 MIN: CPT | Mod: NTX,S$GLB,, | Performed by: INTERNAL MEDICINE

## 2023-06-22 PROCEDURE — 3077F SYST BP >= 140 MM HG: CPT | Mod: CPTII,NTX,S$GLB, | Performed by: INTERNAL MEDICINE

## 2023-06-22 PROCEDURE — 99215 OFFICE O/P EST HI 40 MIN: CPT | Mod: NTX,S$GLB,, | Performed by: INTERNAL MEDICINE

## 2023-06-22 PROCEDURE — 3078F PR MOST RECENT DIASTOLIC BLOOD PRESSURE < 80 MM HG: ICD-10-PCS | Mod: CPTII,NTX,S$GLB, | Performed by: INTERNAL MEDICINE

## 2023-06-22 PROCEDURE — 3077F PR MOST RECENT SYSTOLIC BLOOD PRESSURE >= 140 MM HG: ICD-10-PCS | Mod: CPTII,NTX,S$GLB, | Performed by: INTERNAL MEDICINE

## 2023-06-22 PROCEDURE — 3044F PR MOST RECENT HEMOGLOBIN A1C LEVEL <7.0%: ICD-10-PCS | Mod: CPTII,NTX,S$GLB, | Performed by: INTERNAL MEDICINE

## 2023-06-22 PROCEDURE — 99497 PR ADVNCD CARE PLAN 30 MIN: ICD-10-PCS | Mod: NTX,S$GLB,, | Performed by: INTERNAL MEDICINE

## 2023-06-22 PROCEDURE — 99215 PR OFFICE/OUTPT VISIT, EST, LEVL V, 40-54 MIN: ICD-10-PCS | Mod: NTX,S$GLB,, | Performed by: INTERNAL MEDICINE

## 2023-06-22 PROCEDURE — 3078F DIAST BP <80 MM HG: CPT | Mod: CPTII,NTX,S$GLB, | Performed by: INTERNAL MEDICINE

## 2023-06-22 PROCEDURE — 3008F BODY MASS INDEX DOCD: CPT | Mod: CPTII,NTX,S$GLB, | Performed by: INTERNAL MEDICINE

## 2023-06-22 PROCEDURE — 3044F HG A1C LEVEL LT 7.0%: CPT | Mod: CPTII,NTX,S$GLB, | Performed by: INTERNAL MEDICINE

## 2023-06-22 PROCEDURE — 1160F PR REVIEW ALL MEDS BY PRESCRIBER/CLIN PHARMACIST DOCUMENTED: ICD-10-PCS | Mod: CPTII,NTX,S$GLB, | Performed by: INTERNAL MEDICINE

## 2023-06-22 NOTE — PROGRESS NOTES
Consult Note  Palliative Care      Consult Requested By: Dr. Syeda Arango  Reason for Consult:  Discussion about advance care planning, goals of care, and symptom management in a patient with significant lung disease considering lung transplantation.      ASSESSMENT/PLAN:     Plan/Recommendations:  Diagnoses and all orders for this visit:    IPF (idiopathic pulmonary fibrosis)  Patient is completing lung transplant evaluation.  She has a heart catheterization planned for tomorrow.  She recently had vaccinations to update her immunization status.  She is quite hopeful.  Dyspnea on exertion  Patient currently uses 4-6 L of oxygen a minute.  She paces herself.  She goes to the grocery store in the early morning when they are less people and possible contagious diseases there.  We did discuss the use of opioids for shortness of breath of any cause.  She has had projectile vomiting to the use of morphine and potentially the use of low-dose hydromorphone may be a better drug for her if she needs it.  At this time she is not interested but will reconsider if she is not a transplant candidate.  We also talked about the use of low-dose prednisone continuously versus pulse dose prednisone for symptom exacerbations.  She is followed by a pulmonologist in [place].  Pulmonary emphysema with fibrosis of lung  -     Ambulatory referral/consult to CLINIC Palliative Care  Patient is completing lung transplant evaluation.  She has a heart catheterization planned for tomorrow.  She recently had vaccinations to update her immunization status.  She is quite hopeful.  Lung transplant candidate  -     Ambulatory referral/consult to CLINIC Palliative Care  Patient is completing lung transplant evaluation.  She has a heart catheterization planned for tomorrow.  She recently had vaccinations to update her immunization status.  She is quite hopeful.  Palliative care by specialist  Impression:    Symptoms:   Dyspnea dyspnea       Medicolegal: has decision making capacity.  Bree Shin  is surrogate decision maker.   Bree Shin  is HCPOA.     Psychosocial:  support system consists of family and friends     Spiritual: Yazidism    Prognostication: fair to poor without transplant    Understanding of disease and Illness Trajectory: Patient and Family  has  good understanding of her illness, they can benefit from continued education on what to expect in the future.      Goals of care:  [place] and family time.  Advance Care Planning     Date: 06/22/2023  Advance Care Planning     Date: 06/22/2023    Today a voluntary meeting took place: Exam Room    Patient Participation: Patient is able to participate     Attendees (Name and  Relationship to patient): Health care power of : Bree Shin sister in law    Staff attendees (Name and  Role): Roya Eid MSW witnessed documents    ACP Conversation (General): Understanding of advance care planning and role of health care agent defined She would like to name her sister in law as HCPOA with daughter Dennis menjivar.  Understanding of current condition she understands the serious nature of her current illness.  She is looking forward to seeing whether she is a long transplant candidate.  She is interested in symptom management with or without the transplant.  She has a heart catheterization planned for tomorrow.    Code Status: Full Code for now     ACP Documents: Completed new HCPOA and LW    Goals of care: The patient and family endorses that what is most important right now is to focus on curative/life-prolongation (regardless of treatment burdens) at least until decision on transplantation is made. She would not want to be on life support if she is not going to get better. She likewise would not want a feeding tube.    Accordingly, we have decided that the best plan to meet the patient's goals includes continuing with treatment      Recommendations/  Follow-up tasks:  The patient and health care agent were provided the following recommendations we will journey along with she and her family with her series illness.  We will help her with symptom management and discussions at each decision tree about her illness.  She is comfortable with her sister-in-law being her decision maker as she accompanies her to most of her appointments and she is a retired surgical nurse.       Length of ACP   conversation in minutes: >16 minutes          Code status: Full for now; if not expected to get better would want to be comfortable.    Advance directives:completed today      Summary and recommendations:      >16 min time was spent on advance care planning, goals of care discussion, emotional support, formulating and communicating prognosis and goals of care, exploring burden/benefit of various approaches of treatment.              Understanding of illness/Prognosis:  She understands that she has a series illness which is life-limiting.  She is eager to discover if she is a transplant candidate.    Goals of care:  She would like to be independent and functional as long as possible.  She would not want long-term life support if she was not going to improve.    Follow up:  As needed pending transplant evaluation completion.  If she is not a transplant candidate, would continue to follow her and manage her symptoms as they arise.  She does live close to [facility] and they have recruited a palliative medicine doctor who will be starting in the fall.    Patient's encounter and above plan of care discussed with patient's sister-in-law Bree    SUBJECTIVE:     History of Present Illness:  Patient is a 62 y.o. year old female presenting with severe, hypoxic respiratory failure requiring oxygen.  Her exertional dyspnea is significant.  She manages that by pacing herself and taking rests.  She is bothered by fatigue which she says is most likely related to how much the work of breathing  requires.  She usually gets up early takes a nap in the late morning.  She is good to go than for awhile.  She is still able to go to the grocery store.  She goes early in the morning when they are not a lot of people there.  She is able to park in a handicapped parking space and walk into the building and do everything she needs with a slow paced effort.  She says she is been doing well she has recently felt like she was more short of breath after receiving high dose flu vaccine pneumonia vaccine as well as shingles vaccine.  She did go to urgent care however x-ray was negative for infiltrate and she was treated with expectant care.  She says she is doing okay right now.  She occasionally has coughing jags.  They resolve fairly quickly.    She did want to clarify something that is in her medical record.  April 22, 2023 she was found to have 90,000 E coli in her urine.  This was temporally associated with a boil water advisory that she was unaware of.  She did not have E coli in her blood.  She did have diverticulitis in the past and had a colon resection and has not had any problems since that time.  Her sister-in-law Bree is a retired surgical nurse who corroborates this story.        Past Medical History:   Diagnosis Date    Anal fissure     Ureña's esophagus     CAD (coronary artery disease)     COPD (chronic obstructive pulmonary disease)     Diverticulitis     Hiatal hernia     IPF (idiopathic pulmonary fibrosis)      Past Surgical History:   Procedure Laterality Date    COLECTOMY  2012    COLONOSCOPY  10/2019    most recent, has every three years    CORONARY ANGIOPLASTY WITH STENT PLACEMENT  12/08/2020    DILATION AND CURETTAGE OF UTERUS      x3 (miscarriages)    ESOPHAGEAL DILATION      EXCISION OF MELANOMA  2013    on back    HYSTERECTOMY  06/01/2020    SQUAMOUS CELL CARCINOMA EXCISION  2020    lip    THORACOTOMY Left 1991    THORACOTOMY Right 1993    spontaneous pneumo     Family History   Problem  Relation Age of Onset    COPD Mother          at 72    Heart disease Mother     Other Mother         addiction history    Alcohol abuse Father         65    Kidney disease Father         ESRD    Breast cancer Sister         2016    No Known Problems Brother     Alzheimer's disease Brother         diagnosed in the last year    No Known Problems Brother     Bipolar disorder Sister     Coronary artery disease Sister         stented 2 months ago    Mgpt-Fnmcj-Teoncny disease Sister 6        hip replacement x 2, 'every ten years hip replacement'    No Known Problems Daughter     Other Daughter         spontaneous pneumo x 3 at 13    Hashimoto's thyroiditis Daughter     Other Daughter         alcohol abuse, 9 months sober 2020    Hypertension Daughter     Pneumonia Daughter         2017 influenza; intubated 14 days     Review of patient's allergies indicates:   Allergen Reactions    Codeine Nausea And Vomiting    Morphine Nausea And Vomiting       Medications:    Current Outpatient Medications:     albuterol (ACCUNEB) 1.25 mg/3 mL Nebu, Take 3 mLs by nebulization every 6 (six) hours as needed., Disp: , Rfl:     albuterol (PROVENTIL) 2.5 mg /3 mL (0.083 %) nebulizer solution, Take 2.5 mg by nebulization every 4 (four) hours as needed., Disp: , Rfl:     albuterol (PROVENTIL/VENTOLIN HFA) 90 mcg/actuation inhaler, Inhale 2 puffs into the lungs every 4 (four) hours as needed., Disp: , Rfl:     aspirin (ECOTRIN) 81 MG EC tablet, Take 81 mg by mouth Daily., Disp: , Rfl:     BREZTRI AEROSPHERE 160-9-4.8 mcg/actuation HFAA, Inhale 2 puffs into the lungs 2 (two) times a day., Disp: , Rfl:     famotidine (PEPCID) 40 MG tablet, Take 40 mg by mouth every evening., Disp: , Rfl:     hydrOXYzine HCL (ATARAX) 25 MG tablet, Take 25 mg by mouth nightly as needed. For hives, Disp: , Rfl:     ibuprofen (ADVIL,MOTRIN) 600 MG tablet, Take 600 mg by mouth 4 (four) times daily. Prn  / sparingly due to diverticulitis, Disp: , Rfl:      ipratropium (ATROVENT) 0.02 % nebulizer solution, Take by nebulization every 4 (four) hours as needed., Disp: , Rfl:     LINZESS 72 mcg Cap capsule, Take 72 mcg by mouth daily as needed., Disp: , Rfl:     naloxone (NARCAN) 4 mg/actuation Spry, USE 1 SPRAY in nose ONCE. MAY REPEAT every 2 TO 3 minutes UNTIL patient responds, Disp: , Rfl:     NEXLIZET 180-10 mg Tab, Take 1 tablet by mouth once daily., Disp: , Rfl:     nystatin (MYCOSTATIN) powder, TO THE AFFECTED AREA(S) TWICE DAILY, Disp: , Rfl:     nystatin-triamcinolone (MYCOLOG) ointment, TO THE AFFECTED AREA(S) TWICE DAILY, Disp: , Rfl:     OFEV 150 mg Cap, Take 150 mg by mouth 2 (two) times a day., Disp: , Rfl:     pantoprazole (PROTONIX) 40 MG tablet, Take 40 mg by mouth once daily., Disp: , Rfl:     predniSONE (DELTASONE) 20 MG tablet, Take 2 tablets by mouth once daily., Disp: , Rfl:     sucralfate (CARAFATE) 1 gram tablet, Take 1 g by mouth 4 (four) times daily., Disp: , Rfl:     traMADoL (ULTRAM) 50 mg tablet, Take 50 mg by mouth every 12 (twelve) hours as needed., Disp: , Rfl:     tretinoin (RETIN-A) 0.05 % cream, apply a SMALL AMOUNT TO acne prone face nightly, Disp: , Rfl:     varicella-zoster gE-AS01B, PF, (SHINGRIX) 50 mcg/0.5 mL injection, Inject into the muscle., Disp: 1 each, Rfl: 1    Current Facility-Administered Medications:     sodium chloride 0.9% flush 10 mL, 10 mL, Intravenous, PRN, Polo Sanon MD    OBJECTIVE:       ROS:  Review of Systems   Constitutional:  Positive for fatigue.   HENT: Negative.     Eyes: Negative.    Respiratory:  Positive for cough and shortness of breath.    Cardiovascular: Negative.  Negative for chest pain.   Gastrointestinal:  Positive for constipation.   Endocrine: Negative.    Genitourinary: Negative.    Musculoskeletal: Negative.    Skin: Negative.    Allergic/Immunologic: Negative.    Neurological: Negative.    Hematological: Negative.    Psychiatric/Behavioral: Negative.          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     Review of Symptoms      Symptom Assessment (ESAS 0-10 Scale)  Pain:  0  Dyspnea:  0  Anxiety:  3  Nausea:  0  Depression:  0  Anorexia:  0  Fatigue:  2  Insomnia:  0  Restlessness:  0  Agitation:  0     CAM / Delirium:  Negative  Constipation:  Positive  Diarrhea:  Negative    Constipation:  Constipation    Bowel Management Plan (BMP):  Yes      Pain Assessment:  OME in 24 hours:  O  Location(s):      Modified Stephanie Scale:  2    Performance Status:  80    Living Arrangements:  Lives with friend, Lives in home and Lives >50 miles from facility    Psychosocial/Cultural:   See Palliative Psychosocial Note: No  She is  although still works with her ex-.  Her current boyfriend is very supportive and the 3 of them get along.  She has 3 daughters.  For grandchildren.  She is 1 of 7 children and she is the youngest.  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  Her family is quite supportive of the potential lung transplant.  She has good coworkers who help her with the residential homes.  Her sister-in-law Bree is her main support.  Her siblings attend her medical meetings allowing her children and grandchildren to continue with their lives.  She is a former smoker former drinker.  **Primary  to Follow**  Palliative Care  Consult: No    Spiritual:  F - Jennifer and Belief:  Gnosticism  I - Importance:  Yes  C - Community:  Yes  A - Address in Care:  Yes    Advance Care Planning   Advance Directives:   Living Will: Yes    LaPOST: No    Do Not Resuscitate Status: No    Medical Power of : Yes    Agent's Name:  Bree Shin   Agent's Contact Number:  [phone]    Decision Making:  Patient answered questions and Family answered  questions  Goals of Care: The patient and family endorses that what is most important right now is to focus on extending life as long as possible, even it it means sacrificing quality. Should things be not going well, her family (Bree and daughter Dennis would be her decision makers) They have all discussed this.    Accordingly, we have decided that the best plan to meet the patient's goals includes continuing with treatment and seeking lung transplantation.            Physical Exam:  Vitals: Pulse: 77 (06/22/23 0843)  BP: (!) 148/64 (06/22/23 0843)  SpO2: 99 % (06/22/23 0843)  Physical Exam  Vitals and nursing note reviewed.   Constitutional:       Appearance: Normal appearance.      Comments: Wearing oxygen   HENT:      Head: Normocephalic.      Right Ear: External ear normal.      Left Ear: External ear normal.      Nose: Nose normal.      Mouth/Throat:      Mouth: Mucous membranes are moist.   Eyes:      Conjunctiva/sclera: Conjunctivae normal.      Pupils: Pupils are equal, round, and reactive to light.   Cardiovascular:      Rate and Rhythm: Normal rate.   Pulmonary:      Effort: Pulmonary effort is normal.      Breath sounds: Wheezing and rhonchi present.   Musculoskeletal:         General: Normal range of motion.      Cervical back: Normal range of motion and neck supple.   Skin:     General: Skin is warm.   Neurological:      Mental Status: She is alert and oriented to person, place, and time.      Gait: Gait is intact.   Psychiatric:         Mood and Affect: Mood and affect normal.         Behavior: Behavior normal.         Thought Content: Thought content normal.         Cognition and Memory: Memory normal.         Judgment: Judgment normal.       Labs:  CBC:   WBC   Date Value Ref Range Status   06/12/2023 10.38 3.90 - 12.70 K/uL Final     Hemoglobin   Date Value Ref Range Status   06/12/2023 14.0 12.0 - 16.0 g/dL Final     Hematocrit   Date Value Ref Range Status   06/12/2023 43.4 37.0 - 48.5 % Final      MCV   Date Value Ref Range Status   06/12/2023 98 82 - 98 fL Final     Platelets   Date Value Ref Range Status   06/12/2023 433 150 - 450 K/uL Final       LFT:   Lab Results   Component Value Date    AST 33 06/12/2023    ALKPHOS 39 (L) 06/12/2023    BILITOT 0.4 06/12/2023       Albumin:   Albumin   Date Value Ref Range Status   06/12/2023 3.8 3.5 - 5.2 g/dL Final     Protein:   Total Protein   Date Value Ref Range Status   06/12/2023 7.1 6.0 - 8.4 g/dL Final       Radiology:I have reviewed all pertinent imaging results/findings within the past 24 hours.      I spent a total of 100 minutes on the day of the visit.This includes face to face time in discussion of goals of care, symptom assessment, coordination of care and emotional support.  This also includes non-face to face time preparing to see the patient (eg, review of tests/imaging), obtaining and/or reviewing separately obtained history, documenting clinical information in the electronic or other health record, independently interpreting results and communicating results to the patient/family/caregiver, or care coordinator.       >16 minutes spent in discussing ACP    Signature: Roya Beasley MD

## 2023-06-23 ENCOUNTER — HOSPITAL ENCOUNTER (OUTPATIENT)
Facility: HOSPITAL | Age: 63
Discharge: HOME | End: 2023-06-23
Attending: INTERNAL MEDICINE | Admitting: INTERNAL MEDICINE
Payer: MEDICARE

## 2023-06-23 VITALS
HEART RATE: 72 BPM | TEMPERATURE: 98 F | RESPIRATION RATE: 20 BRPM | SYSTOLIC BLOOD PRESSURE: 146 MMHG | WEIGHT: 138 LBS | HEIGHT: 64 IN | BODY MASS INDEX: 23.56 KG/M2 | OXYGEN SATURATION: 95 % | DIASTOLIC BLOOD PRESSURE: 67 MMHG

## 2023-06-23 DIAGNOSIS — I99.8 OTHER DISORDER OF CIRCULATORY SYSTEM: ICD-10-CM

## 2023-06-23 DIAGNOSIS — J84.9 ILD (INTERSTITIAL LUNG DISEASE): ICD-10-CM

## 2023-06-23 DIAGNOSIS — Z76.82 ORGAN TRANSPLANT CANDIDATE: ICD-10-CM

## 2023-06-23 DIAGNOSIS — J84.112 IPF (IDIOPATHIC PULMONARY FIBROSIS): ICD-10-CM

## 2023-06-23 LAB
ABO + RH BLD: NORMAL
ANION GAP SERPL CALC-SCNC: 10 MMOL/L (ref 8–16)
APTT PPP: 25.9 SEC (ref 21–32)
BLD GP AB SCN CELLS X3 SERPL QL: NORMAL
BUN SERPL-MCNC: 12 MG/DL (ref 6–20)
CALCIUM SERPL-MCNC: 9.2 MG/DL (ref 8.7–10.5)
CHLORIDE SERPL-SCNC: 106 MMOL/L (ref 95–110)
CO2 SERPL-SCNC: 23 MMOL/L (ref 23–29)
CREAT SERPL-MCNC: 0.7 MG/DL (ref 0.5–1.4)
ERYTHROCYTE [DISTWIDTH] IN BLOOD BY AUTOMATED COUNT: 13.9 % (ref 11.5–14.5)
EST. GFR  (NO RACE VARIABLE): >60 ML/MIN/1.73 M^2
GLUCOSE SERPL-MCNC: 118 MG/DL (ref 70–110)
HCT VFR BLD AUTO: 39.7 % (ref 37–48.5)
HGB BLD-MCNC: 13.2 G/DL (ref 12–16)
INR PPP: 0.9 (ref 0.8–1.2)
MCH RBC QN AUTO: 32.8 PG (ref 27–31)
MCHC RBC AUTO-ENTMCNC: 33.2 G/DL (ref 32–36)
MCV RBC AUTO: 99 FL (ref 82–98)
PLATELET # BLD AUTO: 450 K/UL (ref 150–450)
PMV BLD AUTO: 9.6 FL (ref 9.2–12.9)
POTASSIUM SERPL-SCNC: 4.3 MMOL/L (ref 3.5–5.1)
PROTHROMBIN TIME: 10.1 SEC (ref 9–12.5)
RBC # BLD AUTO: 4.02 M/UL (ref 4–5.4)
SODIUM SERPL-SCNC: 139 MMOL/L (ref 136–145)
SPECIMEN OUTDATE: NORMAL
WBC # BLD AUTO: 11.45 K/UL (ref 3.9–12.7)

## 2023-06-23 PROCEDURE — C1769 GUIDE WIRE: HCPCS | Mod: NTX | Performed by: INTERNAL MEDICINE

## 2023-06-23 PROCEDURE — 94761 N-INVAS EAR/PLS OXIMETRY MLT: CPT | Mod: NTX

## 2023-06-23 PROCEDURE — 25500020 PHARM REV CODE 255: Mod: TXP | Performed by: INTERNAL MEDICINE

## 2023-06-23 PROCEDURE — 93010 EKG 12-LEAD: ICD-10-PCS | Mod: TXP,,, | Performed by: INTERNAL MEDICINE

## 2023-06-23 PROCEDURE — C1887 CATHETER, GUIDING: HCPCS | Mod: NTX | Performed by: INTERNAL MEDICINE

## 2023-06-23 PROCEDURE — 99153 MOD SED SAME PHYS/QHP EA: CPT | Mod: TXP | Performed by: INTERNAL MEDICINE

## 2023-06-23 PROCEDURE — 99152 MOD SED SAME PHYS/QHP 5/>YRS: CPT | Mod: TXP,,, | Performed by: INTERNAL MEDICINE

## 2023-06-23 PROCEDURE — 85027 COMPLETE CBC AUTOMATED: CPT | Mod: TXP | Performed by: INTERNAL MEDICINE

## 2023-06-23 PROCEDURE — 85730 THROMBOPLASTIN TIME PARTIAL: CPT | Mod: NTX | Performed by: INTERNAL MEDICINE

## 2023-06-23 PROCEDURE — 99152 MOD SED SAME PHYS/QHP 5/>YRS: CPT | Mod: NTX | Performed by: INTERNAL MEDICINE

## 2023-06-23 PROCEDURE — 99152 PR MOD CONSCIOUS SEDATION, SAME PHYS, 5+ YRS, FIRST 15 MIN: ICD-10-PCS | Mod: TXP,,, | Performed by: INTERNAL MEDICINE

## 2023-06-23 PROCEDURE — 80048 BASIC METABOLIC PNL TOTAL CA: CPT | Mod: NTX | Performed by: INTERNAL MEDICINE

## 2023-06-23 PROCEDURE — 94640 AIRWAY INHALATION TREATMENT: CPT | Mod: NTX

## 2023-06-23 PROCEDURE — 93010 ELECTROCARDIOGRAM REPORT: CPT | Mod: TXP,,, | Performed by: INTERNAL MEDICINE

## 2023-06-23 PROCEDURE — C1894 INTRO/SHEATH, NON-LASER: HCPCS | Mod: TXP | Performed by: INTERNAL MEDICINE

## 2023-06-23 PROCEDURE — 93005 ELECTROCARDIOGRAM TRACING: CPT | Mod: 59,NTX

## 2023-06-23 PROCEDURE — C1751 CATH, INF, PER/CENT/MIDLINE: HCPCS | Mod: TXP | Performed by: INTERNAL MEDICINE

## 2023-06-23 PROCEDURE — 93460 R&L HRT ART/VENTRICLE ANGIO: CPT | Mod: 26,TXP,, | Performed by: INTERNAL MEDICINE

## 2023-06-23 PROCEDURE — 86900 BLOOD TYPING SEROLOGIC ABO: CPT | Mod: NTX | Performed by: INTERNAL MEDICINE

## 2023-06-23 PROCEDURE — 25000003 PHARM REV CODE 250: Mod: NTX | Performed by: INTERNAL MEDICINE

## 2023-06-23 PROCEDURE — 25000242 PHARM REV CODE 250 ALT 637 W/ HCPCS: Mod: TXP | Performed by: STUDENT IN AN ORGANIZED HEALTH CARE EDUCATION/TRAINING PROGRAM

## 2023-06-23 PROCEDURE — 25000003 PHARM REV CODE 250: Mod: TXP | Performed by: INTERNAL MEDICINE

## 2023-06-23 PROCEDURE — 93005 ELECTROCARDIOGRAM TRACING: CPT | Mod: TXP

## 2023-06-23 PROCEDURE — 27000221 HC OXYGEN, UP TO 24 HOURS: Mod: NTX

## 2023-06-23 PROCEDURE — 93460 R&L HRT ART/VENTRICLE ANGIO: CPT | Mod: TXP | Performed by: INTERNAL MEDICINE

## 2023-06-23 PROCEDURE — 93460 PR CATH PLACE/CORON ANGIO, IMG SUPER/INTERP,R&L HRT CATH, L HRT VENTRIC: ICD-10-PCS | Mod: 26,TXP,, | Performed by: INTERNAL MEDICINE

## 2023-06-23 PROCEDURE — 63600175 PHARM REV CODE 636 W HCPCS: Mod: UD,TXP | Performed by: INTERNAL MEDICINE

## 2023-06-23 PROCEDURE — 63600175 PHARM REV CODE 636 W HCPCS: Mod: UD,NTX | Performed by: INTERNAL MEDICINE

## 2023-06-23 PROCEDURE — 99900035 HC TECH TIME PER 15 MIN (STAT): Mod: TXP

## 2023-06-23 PROCEDURE — 85610 PROTHROMBIN TIME: CPT | Mod: NTX | Performed by: INTERNAL MEDICINE

## 2023-06-23 RX ORDER — MIDAZOLAM HYDROCHLORIDE 1 MG/ML
INJECTION INTRAMUSCULAR; INTRAVENOUS
Status: DISCONTINUED | OUTPATIENT
Start: 2023-06-23 | End: 2023-06-23 | Stop reason: HOSPADM

## 2023-06-23 RX ORDER — ALBUTEROL SULFATE 2.5 MG/.5ML
2.5 SOLUTION RESPIRATORY (INHALATION) EVERY 4 HOURS PRN
Status: DISCONTINUED | OUTPATIENT
Start: 2023-06-23 | End: 2023-06-23 | Stop reason: HOSPADM

## 2023-06-23 RX ORDER — ONDANSETRON 2 MG/ML
INJECTION INTRAMUSCULAR; INTRAVENOUS
Status: DISCONTINUED | OUTPATIENT
Start: 2023-06-23 | End: 2023-06-23 | Stop reason: HOSPADM

## 2023-06-23 RX ORDER — SODIUM CHLORIDE 9 MG/ML
INJECTION, SOLUTION INTRAVENOUS CONTINUOUS
Status: ACTIVE | OUTPATIENT
Start: 2023-06-23 | End: 2023-06-23

## 2023-06-23 RX ORDER — HEPARIN SODIUM 1000 [USP'U]/ML
INJECTION, SOLUTION INTRAVENOUS; SUBCUTANEOUS
Status: DISCONTINUED | OUTPATIENT
Start: 2023-06-23 | End: 2023-06-23 | Stop reason: HOSPADM

## 2023-06-23 RX ORDER — DIPHENHYDRAMINE HCL 50 MG
50 CAPSULE ORAL ONCE
Status: COMPLETED | OUTPATIENT
Start: 2023-06-23 | End: 2023-06-23

## 2023-06-23 RX ORDER — FENTANYL CITRATE 50 UG/ML
INJECTION, SOLUTION INTRAMUSCULAR; INTRAVENOUS
Status: DISCONTINUED | OUTPATIENT
Start: 2023-06-23 | End: 2023-06-23 | Stop reason: HOSPADM

## 2023-06-23 RX ORDER — LIDOCAINE HYDROCHLORIDE 20 MG/ML
INJECTION, SOLUTION INFILTRATION; PERINEURAL
Status: DISCONTINUED | OUTPATIENT
Start: 2023-06-23 | End: 2023-06-23 | Stop reason: HOSPADM

## 2023-06-23 RX ORDER — HEPARIN SOD,PORCINE/0.9 % NACL 1000/500ML
INTRAVENOUS SOLUTION INTRAVENOUS
Status: DISCONTINUED | OUTPATIENT
Start: 2023-06-23 | End: 2023-06-23 | Stop reason: HOSPADM

## 2023-06-23 RX ORDER — ONDANSETRON 8 MG/1
8 TABLET, ORALLY DISINTEGRATING ORAL EVERY 8 HOURS PRN
Status: DISCONTINUED | OUTPATIENT
Start: 2023-06-23 | End: 2023-06-23 | Stop reason: HOSPADM

## 2023-06-23 RX ORDER — ACETAMINOPHEN 325 MG/1
650 TABLET ORAL EVERY 4 HOURS PRN
Status: DISCONTINUED | OUTPATIENT
Start: 2023-06-23 | End: 2023-06-23 | Stop reason: HOSPADM

## 2023-06-23 RX ADMIN — DIPHENHYDRAMINE HYDROCHLORIDE 50 MG: 50 CAPSULE ORAL at 07:06

## 2023-06-23 RX ADMIN — SODIUM CHLORIDE: 9 INJECTION, SOLUTION INTRAVENOUS at 07:06

## 2023-06-23 RX ADMIN — ALBUTEROL SULFATE 2.5 MG: 2.5 SOLUTION RESPIRATORY (INHALATION) at 12:06

## 2023-06-23 NOTE — Clinical Note
The PA catheter was repositioned to the main pulmonary artery. Hemodynamics were performed. O2 saturation was measured at 71%.

## 2023-06-23 NOTE — Clinical Note
The groin, right radial and right brachial was prepped. The site was prepped with ChloraPrep. The patient was draped.

## 2023-06-23 NOTE — Clinical Note
90 ml of contrast were injected throughout the case. 60 mL of contrast was the total wasted during the case. 150 mL was the total amount used during the case.

## 2023-06-23 NOTE — BRIEF OP NOTE
Brief Operative Note:    : Polo Sanon MD     Referring Physician: Polo Blackburn     All Operators: Surgeon(s):  MD Alexander Adamson MD Imad Bagh, MD     Preoperative Diagnosis: Organ transplant candidate [Z76.82]  IPF (idiopathic pulmonary fibrosis) [J84.112]     Postop Diagnosis: Organ transplant candidate [Z76.82]  IPF (idiopathic pulmonary fibrosis) [J84.112]    Treatments/Procedures: Procedure(s) (LRB):  CATHETERIZATION, HEART, BOTH LEFT AND RIGHT (N/A)    Findings:Mild coronary disease is present. See catheterization report for full details.    -normal RHC filling pressures  -mild stenosis to mRCA, patent RCA stent     Estimated Blood loss: 20 cc    Specimens removed: No    Recommendations:   - Routine post-cath care as per orders  - IVF at 150 cc/hr for 2 hrs  - Continue medical management, Risk factor reduction, Follow-up with outpatient cardiologist    Alexander Gaffney

## 2023-06-23 NOTE — NURSING
@1015 Assumed care of pt. Report from Stephen martell RN. Pt s/p LHC and RHC with right brachial vein access and right radial artery access. AAOx3. VSS, no c/o pain or discomfort at this time, resp even and unlabored on 4 LNC, at baseline. Gauze/tegaderm dressing to right brachial vein is clean, dry and intact. Right wrist vasc band in place. No active bleeding or hematoma noted to either site. Post procedure protocol reviewed with patient and patient's family. Understanding verbalized. Family members at bedside. Nurse call bell within reach. Monitoring per post procedure protocol. Stand by assist provided by RN. Pt ambulatory without distress and voids without issue.

## 2023-06-23 NOTE — NURSING
Pt ambulates to restroom and has CROOKS/wheezing. Coughing and using personal inhaler. Gulshan SALCEDO cardio fellow made aware, awaiting order for neb per pt request.

## 2023-06-23 NOTE — NURSING
Verbal order per Alexander Gaffney MD albuterol 2.5mg neb x one dose for SOB/CROOKS wheeze.  Respiratory called to bedside.

## 2023-06-23 NOTE — NURSING
Patient discharged per MD orders. Instructions given on medications, wound care, activity, signs of infection, when to call MD, and follow up appointments. Pt verbalized understanding. PIV removed. Patient and family used wc off unit to private vehicle. Right wrist remains clean, dry and intact with gauze and tegaderm in place.

## 2023-06-23 NOTE — HOSPITAL COURSE
Patient brought for planned RHC/LHC that was significant for normal filling pressures and mild CAD with patent stent, please see report for full details. The patient tolerated the procedure well and was instructed to continue asa and follow up with CTS for possible lung transplant. She was then discharged in stable condition.

## 2023-06-23 NOTE — DISCHARGE INSTRUCTIONS
Discharge Instructions and Care of Your Wrist After a Cardiac Catheterization Procedure Performed via the Radial Artery    For 24 Hours following the procedure:  Do not subject your affected hand/arm to any forceful movements (i.e. supporting weight when rising from a chair or bed)  Do not drive a car for 24 hours  The dressing (band-aid) on the puncture site may be removed after 24 hours and left open to air.  If there is minor oozing, you may apply another Band-aid and remove after 12 hours  You may shower on the day following your procedure.  Do not take a tub bath or submerge the puncture site in water for 3 days following the procedure    For 72 hours following the procedure:   Do not lift anything heavier than 3 to 5 pounds with the affected hand/arm  Do not operate a lawnmower, motorcycle, chainsaw, or all-terrain vehicle   Avoid excessive (extension/flexion) wrist movement (i.e. supporting weight when rising from a chair or bed, push-ups, lifting garage doors, etc.)  Do not engage in vigorous exercise (i.e. Tennis, Golf, Bowling) using the affected arm    If bleeding should occur following discharge:  Sit down and apply firm pressure to the puncture site with your fingers for 10 minutes   If the bleeding stops, continue to sit quietly, keeping your wrist straight for 2 hours. Notify your physician as soon as possible   If bleeding does not stop after 10 minutes or if there is a large amount of bleeding or spurting, call 911 immediately.  Do not drive yourself to the hospital.     You should expect mild tingling in your hand and tenderness at the puncture site for up to 3 days.     Notify your physician if these symptoms persist or if you experience:  Change in color or temperature of the hand or arm  Redness, heat, or pus at the puncture site  Chills or fever greater than 101.0 F

## 2023-06-23 NOTE — DISCHARGE SUMMARY
[facility]  Interventional Cardiology  Discharge Summary      Patient Name: Antonella Chase  MRN: [id number]  Admission Date: 6/23/2023  Hospital Length of Stay: 0 days  Discharge Date and Time:  06/23/2023 10:11 AM  Attending Physician: Polo Sanon MD  Discharging Provider: Alexander Gaffney MD  Primary Care Physician: Primary Doctor No    HPI:  No notes on file    Procedure(s) (LRB):  CATHETERIZATION, HEART, BOTH LEFT AND RIGHT (N/A)     Indwelling Lines/Drains at time of discharge:  Lines/Drains/Airways     None                 Hospital Course:  Patient brought for planned RHC/LHC that was significant for normal filling pressures and mild CAD with patent stent, please see report for full details. The patient tolerated the procedure well and was instructed to continue asa and follow up with CTS for possible lung transplant. She was then discharged in stable condition.         Goals of Care Treatment Preferences:       Health care agent: Bree Shin sister in [facility]  Health care agent number: [phone]    Living Will: Yes     What is most important right now is to focus on curative/life-prolongation (regardless of treatment burdens), extending life as long as possible, even it it means sacrificing quality.  Accordingly, we have decided that the best plan to meet the patient's goals includes continuing with treatment.        Pending Diagnostic Studies:     Procedure Component Value Units Date/Time    EKG 12-LEAD on arrival to floor [[id number]]     Order Status: Sent Lab Status: No result         No new Assessment & Plan notes have been filed under this hospital service since the last note was generated.  Service: Interventional Cardiology      Discharged Condition: good    Follow Up:    Patient Instructions:   No discharge procedures on file.  Medications:  Reconciled Home Medications:      Medication List      CONTINUE taking these medications    * albuterol 1.25 mg/3 mL Nebu  Commonly known as:  ACCUNEB  Take 3 mLs by nebulization every 6 (six) hours as needed.     * albuterol 90 mcg/actuation inhaler  Commonly known as: PROVENTIL/VENTOLIN HFA  Inhale 2 puffs into the lungs every 4 (four) hours as needed.     * albuterol 2.5 mg /3 mL (0.083 %) nebulizer solution  Commonly known as: PROVENTIL  Take 2.5 mg by nebulization every 4 (four) hours as needed.     aspirin 81 MG EC tablet  Commonly known as: ECOTRIN  Take 81 mg by mouth Daily.     BREZTRI AEROSPHERE 160-9-4.8 mcg/actuation Hfaa  Generic drug: budesonide-glycopyr-formoterol  Inhale 2 puffs into the lungs 2 (two) times a day.     famotidine 40 MG tablet  Commonly known as: PEPCID  Take 40 mg by mouth every evening.     hydrOXYzine HCL 25 MG tablet  Commonly known as: ATARAX  Take 25 mg by mouth nightly as needed. For hives     ibuprofen 600 MG tablet  Commonly known as: ADVIL,MOTRIN  Take 600 mg by mouth 4 (four) times daily. Prn  / sparingly due to diverticulitis     ipratropium 0.02 % nebulizer solution  Commonly known as: ATROVENT  Take by nebulization every 4 (four) hours as needed.     LINZESS 72 mcg Cap capsule  Generic drug: linaCLOtide  Take 72 mcg by mouth daily as needed.     naloxone 4 mg/actuation Spry  Commonly known as: NARCAN  USE 1 SPRAY in nose ONCE. MAY REPEAT every 2 TO 3 minutes UNTIL patient responds     NEXLIZET 180-10 mg Tab  Generic drug: bempedoic acid-ezetimibe  Take 1 tablet by mouth once daily.     nystatin powder  Commonly known as: MYCOSTATIN  TO THE AFFECTED AREA(S) TWICE DAILY     nystatin-triamcinolone ointment  Commonly known as: MYCOLOG  TO THE AFFECTED AREA(S) TWICE DAILY     OFEV 150 mg Cap  Generic drug: nintedanib  Take 150 mg by mouth 2 (two) times a day.     pantoprazole 40 MG tablet  Commonly known as: PROTONIX  Take 40 mg by mouth once daily.     predniSONE 20 MG tablet  Commonly known as: DELTASONE  Take 2 tablets by mouth once daily.     SHINGRIX (PF) 50 mcg/0.5 mL injection  Generic drug: varicella-zoster  [id number] (PF)  Inject into the muscle.     sucralfate 1 gram tablet  Commonly known as: CARAFATE  Take 1 g by mouth 4 (four) times daily.     traMADoL 50 mg tablet  Commonly known as: ULTRAM  Take 50 mg by mouth every 12 (twelve) hours as needed.     tretinoin 0.05 % cream  Commonly known as: RETIN-A  apply a SMALL AMOUNT TO acne prone face nightly         * This list has 3 medication(s) that are the same as other medications prescribed for you. Read the directions carefully, and ask your doctor or other care provider to review them with you.                Time spent on the discharge of patient: 20 minutes    Alexander Gaffney MD  Interventional Cardiology  [facility]

## 2023-06-27 ENCOUNTER — TELEPHONE (OUTPATIENT)
Dept: TRANSPLANT | Facility: CLINIC | Age: 63
End: 2023-06-27
Payer: MEDICARE

## 2023-06-27 DIAGNOSIS — J43.9 PULMONARY EMPHYSEMA WITH FIBROSIS OF LUNG: ICD-10-CM

## 2023-06-27 DIAGNOSIS — J84.10 PULMONARY EMPHYSEMA WITH FIBROSIS OF LUNG: ICD-10-CM

## 2023-06-27 DIAGNOSIS — K21.9 GASTROESOPHAGEAL REFLUX DISEASE, UNSPECIFIED WHETHER ESOPHAGITIS PRESENT: ICD-10-CM

## 2023-06-27 DIAGNOSIS — Z76.82 LUNG TRANSPLANT CANDIDATE: ICD-10-CM

## 2023-06-27 DIAGNOSIS — J84.112 IPF (IDIOPATHIC PULMONARY FIBROSIS): Primary | ICD-10-CM

## 2023-06-27 NOTE — TELEPHONE ENCOUNTER
6/28/23 - Received a message from Dr. Rogel in GI stating that an appointment with her will be scheduled for patient.  Discussed with Dr. Arango.  Instructions received for GI consult.  Message sent to patient via [facility] informing her that a GI consult / appointment with Dr. Rogel will be scheduled prior to the GI testing.    6/28/23 - Contacted patient to inform her of the additional GI testing that will be ordered.  She verbalized her understanding.    ----- Message from Syeda Arango DO sent at 6/27/2023 10:22 AM CDT -----  Regarding: RE: GI testing  Yes please.  They said they would work to get our patients in quickly so we are going to try to keep everyone getting the GI testing.  Thanks    ----- Message -----  From: Rebecca Hernandez RN  Sent: 6/23/2023   7:31 AM CDT  To: Syeda Arango DO  Subject: GI testing                                       Patient had an esophagram instead of GI consult.  The esophagram was normal.  Should we order the esophageal manometry and pH impedance as part of the evaluation testing?    Rebecca

## 2023-06-28 ENCOUNTER — PATIENT MESSAGE (OUTPATIENT)
Dept: TRANSPLANT | Facility: CLINIC | Age: 63
End: 2023-06-28
Payer: MEDICARE

## 2023-06-28 ENCOUNTER — TELEPHONE (OUTPATIENT)
Dept: GASTROENTEROLOGY | Facility: CLINIC | Age: 63
End: 2023-06-28
Payer: MEDICARE

## 2023-06-28 ENCOUNTER — TELEPHONE (OUTPATIENT)
Dept: ENDOSCOPY | Facility: HOSPITAL | Age: 63
End: 2023-06-28
Payer: MEDICARE

## 2023-06-28 NOTE — TELEPHONE ENCOUNTER
----- Message from Kalyn Rogel MD sent at 6/28/2023 10:44 AM CDT -----  Regarding: FW: GI testing  Can you schedule this patient into our first available lung transplant opening.  Can you let me know the date of the appt?  Thanks!    Kalyn  ----- Message -----  From: Rebecca Hernandez RN  Sent: 6/28/2023  10:11 AM CDT  To: Emperatriz Echeverria MA, Kalyn Rogel MD, #  Subject: FW: GI testing                                   Dr. Arango would like patient scheduled for esophageal manometry and pH impedance as part of her lung transplant evaluation testing.  She was not scheduled with Dr. Michaels, so how do we go about ordering the testing?    Do we need to enter a case request?  If so, what should be ordered.  Dr. Michaels used to order all the testing, so this is new to me.      Thanks,  Rebecca      ----- Message -----  From: Syeda Arango DO  Sent: 6/27/2023  10:22 AM CDT  To: Rebecca Hernandez RN  Subject: RE: GI testing                                   Yes please.  They said they would work to get our patients in quickly so we are going to try to keep everyone getting the GI testing.  Thanks    ----- Message -----  From: Rebecca Hernandez RN  Sent: 6/23/2023   7:31 AM CDT  To: Syeda Arango DO  Subject: GI testing                                       Patient had an esophagram instead of GI consult.  The esophagram was normal.  Should we order the esophageal manometry and pH impedance as part of the evaluation testing?    Rebecca

## 2023-06-28 NOTE — TELEPHONE ENCOUNTER
----- Message from Kalyn Rogel MD sent at 6/28/2023 10:43 AM CDT -----  Regarding: RE: GI testing  Hi Rebecca,    After discussion with Dr. Michaels I think it would be best to see these patients in clinic first.  I am going to get this patient scheduled in clinic.    Sincerely,    Kalyn  ----- Message -----  From: Rebecca Hernandez RN  Sent: 6/28/2023  10:11 AM CDT  To: Emperatriz Echeverria MA, Kalyn Rogel MD, #  Subject: FW: GI testing                                   Dr. Arango would like patient scheduled for esophageal manometry and pH impedance as part of her lung transplant evaluation testing.  She was not scheduled with Dr. Michaels, so how do we go about ordering the testing?    Do we need to enter a case request?  If so, what should be ordered.  Dr. Michaels used to order all the testing, so this is new to me.      Thanks,  Rebecca      ----- Message -----  From: Syeda Arango DO  Sent: 6/27/2023  10:22 AM CDT  To: Rebecca Hernandez RN  Subject: RE: GI testing                                   Yes please.  They said they would work to get our patients in quickly so we are going to try to keep everyone getting the GI testing.  Thanks    ----- Message -----  From: Rebecca Hernandez RN  Sent: 6/23/2023   7:31 AM CDT  To: Syeda Arango DO  Subject: GI testing                                       Patient had an esophagram instead of GI consult.  The esophagram was normal.  Should we order the esophageal manometry and pH impedance as part of the evaluation testing?    Rebecca

## 2023-07-12 ENCOUNTER — OFFICE VISIT (OUTPATIENT)
Dept: GASTROENTEROLOGY | Facility: CLINIC | Age: 63
End: 2023-07-12
Payer: MEDICARE

## 2023-07-12 DIAGNOSIS — Z76.82 LUNG TRANSPLANT CANDIDATE: ICD-10-CM

## 2023-07-12 DIAGNOSIS — J43.9 PULMONARY EMPHYSEMA WITH FIBROSIS OF LUNG: ICD-10-CM

## 2023-07-12 DIAGNOSIS — J84.10 PULMONARY EMPHYSEMA WITH FIBROSIS OF LUNG: ICD-10-CM

## 2023-07-12 DIAGNOSIS — K21.9 GASTROESOPHAGEAL REFLUX DISEASE, UNSPECIFIED WHETHER ESOPHAGITIS PRESENT: ICD-10-CM

## 2023-07-12 PROCEDURE — 99499 UNLISTED E&M SERVICE: CPT | Mod: 95,NTX,, | Performed by: INTERNAL MEDICINE

## 2023-07-12 PROCEDURE — 99499 NO LOS: ICD-10-PCS | Mod: 95,NTX,, | Performed by: INTERNAL MEDICINE

## 2023-07-12 NOTE — PROGRESS NOTES
The patient location is: [place]  The chief complaint leading to consultation is: Dysphagia, lung transplant evaluation    Visit type: audiovisual    Face to Face time with patient: 10  20 minutes of total time spent on the encounter, which includes face to face time and non-face to face time preparing to see the patient (eg, review of tests), Obtaining and/or reviewing separately obtained history, Documenting clinical information in the electronic or other health record, Independently interpreting results (not separately reported) and communicating results to the patient/family/caregiver, or Care coordination (not separately reported).         Each patient to whom he or she provides medical services by telemedicine is:  (1) informed of the relationship between the physician and patient and the respective role of any other health care provider with respect to management of the patient; and (2) notified that he or she may decline to receive medical services by telemedicine and may withdraw from such care at any time.    Notes:       [facility]    Referral from Dr. Arango    CC: dysphagia    HPI 62 y.o. female with past medical history of IPF, who uses 3-5 liters of O2 daily, CAD who presents with chronic, intermittent, solid food dysphagia without regurgitation.  This is mainly when she is rushing to eat with chicken or bread.    Her last EGD in March 2023 showed moderately severe esophagitis, Ureña's esophagus, ulcers at the GE junction and a stricture in the proximal esophagus.  The stricture was dilated to 18-19mm using a savary dilator.  There were also ulcers in the gastric antrum and inflammation in the duodenal bulb.    The patient denies GERD symptoms.  Due to her EGD findings she takes Protonix 40mg BID, Pepcid nightly and Carafate.    Past Medical History  Past Medical History:   Diagnosis Date    Anal fissure     Ureña's esophagus     CAD (coronary artery disease)     COPD (chronic  obstructive pulmonary disease)     Diverticulitis     Hiatal hernia     IPF (idiopathic pulmonary fibrosis)          Physical Examination  There were no vitals taken for this visit.  General appearance: alert, cooperative, no distress  HENT: Normocephalic, atraumatic, neck symmetrical, no nasal discharge   Neurologic: Alert and oriented X 3, no facial droop or dysarthria    Labs:  Lab Results   Component Value Date    WBC 11.45 06/23/2023    HGB 13.2 06/23/2023    HCT 39.7 06/23/2023    MCV 99 (H) 06/23/2023     06/23/2023         CMP  Sodium   Date Value Ref Range Status   06/23/2023 139 136 - 145 mmol/L Final     Potassium   Date Value Ref Range Status   06/23/2023 4.3 3.5 - 5.1 mmol/L Final     Chloride   Date Value Ref Range Status   06/23/2023 106 95 - 110 mmol/L Final     CO2   Date Value Ref Range Status   06/23/2023 23 23 - 29 mmol/L Final     Glucose   Date Value Ref Range Status   06/23/2023 118 (H) 70 - 110 mg/dL Final     BUN   Date Value Ref Range Status   06/23/2023 12 6 - 20 mg/dL Final     Creatinine   Date Value Ref Range Status   06/23/2023 0.7 0.5 - 1.4 mg/dL Final     Calcium   Date Value Ref Range Status   06/23/2023 9.2 8.7 - 10.5 mg/dL Final     Total Protein   Date Value Ref Range Status   06/12/2023 7.1 6.0 - 8.4 g/dL Final     Albumin   Date Value Ref Range Status   06/12/2023 3.8 3.5 - 5.2 g/dL Final     Total Bilirubin   Date Value Ref Range Status   06/12/2023 0.4 0.1 - 1.0 mg/dL Final     Comment:     For infants and newborns, interpretation of results should be based  on gestational age, weight and in agreement with clinical  observations.    Premature Infant recommended reference ranges:  Up to 24 hours.............<8.0 mg/dL  Up to 48 hours............<12.0 mg/dL  3-5 days..................<15.0 mg/dL  6-29 days.................<15.0 mg/dL       Alkaline Phosphatase   Date Value Ref Range Status   06/12/2023 39 (L) 55 - 135 U/L Final     AST   Date Value Ref Range Status    06/12/2023 33 10 - 40 U/L Final     ALT   Date Value Ref Range Status   06/12/2023 38 10 - 44 U/L Final     Anion Gap   Date Value Ref Range Status   06/23/2023 10 8 - 16 mmol/L Final     eGFR   Date Value Ref Range Status   06/23/2023 >60.0 >60 mL/min/1.73 m^2 Final           Assessment:   The patient is a 63 yo female with IPF on 3-5 liters of O2 undergoing a lung transplant evaluation with dysphagia and EGD findings from March 2023 in [place] showing esophagitis, Ureña's, a proximal esophageal stricture, gastric ulcers and duodenitis.  Photos from her endoscopy are not available.    Plan:  -Schedule EGD with 96 hour Bravo off acid reflux medications to assess for healing of her esophagitis and ulcers, the severity of her previously noted esophageal stricture (needs assessment prior to manometry).  This test will be on endo 2.  -Plan for esophageal manometry after her EGD with bravo with dysphagia protocol.    Kalyn Rogel MD

## 2023-07-17 ENCOUNTER — TELEPHONE (OUTPATIENT)
Dept: ENDOSCOPY | Facility: HOSPITAL | Age: 63
End: 2023-07-17
Payer: MEDICARE

## 2023-07-17 VITALS — WEIGHT: 138 LBS | HEIGHT: 64 IN | BODY MASS INDEX: 23.56 KG/M2

## 2023-07-17 DIAGNOSIS — R13.10 DYSPHAGIA, UNSPECIFIED TYPE: ICD-10-CM

## 2023-07-17 DIAGNOSIS — K21.9 GASTROESOPHAGEAL REFLUX DISEASE, UNSPECIFIED WHETHER ESOPHAGITIS PRESENT: Primary | ICD-10-CM

## 2023-07-17 NOTE — TELEPHONE ENCOUNTER
MD Emperatriz Casas MA; Laina Jimenez, HENRIQUE  [facility] PROCEDURE ORDERS     CLEARANCE FOR PROCEDURES:   [ ] Not needed       PROCEDURES     [ ] Esophageal manometry with impedance - dysphagia (Second)     [ ] EGD with ENDOFLIP and BRAVO 96 hours (FIRST)       Does manometry need to be placed endoscopically:   No     FLOOR:     [ ] 2nd Floor     Reason for 2nd Floor:   [ ] Pre lung transplant       PREP   [ ] Standard Prep       MEDICATIONS     pH Studies     [ ] OFF PPI/H2 Blocker     Metal allergy (stainless steal w chromium, nickel, copper, cobalt and iron).:   No     Pacemaker/defibrillator:   No     Motility Studies (esophageal manometry/anorectal manometry)   Hold narcotics x 1 days if able   Hold TCA x 1 days if able   Propofol/lidocaine only during sedation.  Discuss with Dr. Michaels if additional sedation needed.   Hold baclofen for thee days (at least one day) if able   Hold muscle relaxants x 1 day if able     Anticoagulation/antiplt agents:   No     ORDER OF TESTING:   Day 1: EGD with Endoflip and 96 hour Bravo OFF acid reflux medications   Day 2: At a later date-Esophageal manometry with dysphagia protocol       [ ] No special requirements - pt lives in [place]     SCHEDULING PRIORITY     [ ] Lung Transplant Workup-as soon as possible         URGENCY     [x] Medically NOT Urgent       DIAGNOSIS   [ ] Dysphagia   [ ] GERD         PHYSICIAN   [ ]  Ok with Dr. Rogel

## 2023-07-17 NOTE — TELEPHONE ENCOUNTER
Spoke to Antonella to schedule procedure(s) Upper Endoscopy (EGD) with Bravo Placement       Physician to perform procedure(s) Dr. OLEKSANDR Rogel   Date of Procedure (s) 10/23/23  Arrival Time 11:30 am  Time of Procedure(s) 12:30 PM   Location of Procedure(s) [address]  Type of Rx Prep sent to patient: Other  Instructions provided to patient via [facility]    Patient was informed on the following information and verbalized understanding. Screening questionnaire reviewed with patient and complete. If procedure requires anesthesia, a responsible adult needs to be present to accompany the patient home, patient cannot drive after receiving anesthesia. Appointment details are tentative, especially check-in time. Patient will receive a prep-op call 4 days prior to confirm check-in time for procedure. If applicable the patient should contact their pharmacy to verify Rx for procedure prep is ready for pick-up. Patient was advised to call the scheduling department at [phone] if pharmacy states no Rx is available. Patient was advised to call the endoscopy scheduling department if any questions or concerns arise.      [facility] Scheduling Department

## 2023-07-26 ENCOUNTER — TELEPHONE (OUTPATIENT)
Dept: TRANSPLANT | Facility: CLINIC | Age: 63
End: 2023-07-26
Payer: MEDICARE

## 2023-07-26 NOTE — TELEPHONE ENCOUNTER
"----- Message from Jaclyn Hannah sent at 7/26/2023  1:21 PM CDT -----  Regarding: speak to Rebecca  The patient called requesting to speak to Nurse Rebecca    No further information provided      Patient can be contacted @# [phone] (home)     Contacted patient.  She stated that she went out to dinner on Sunday for her birthday.  Afterwards she went home and rested.  When she woke up, she had a "stabbing pain".  She stated that it was the same pain that she had several years ago when she was diagnosed with ulcers.  She stated that in the past she thought that the chest pain was angina, but it wasn't.  She stated that she went to the ER multiple times, but  was ultimately referred to Dr. Gurrola, her current gastroenterologist, and was diagnosed with ulcers.  Patient stated that she has been taking her Carafate, and she started taking the GI cocktail that was prescribed after her last EGD.  She stated that she is still having the pain.  The pain comes and goes, although the episodes are less frequent today.  She stated, "The esophageal spasms are debilitating."  She stated that she contacted Dr. Gurrola's office, but he is out of town.  She has an appointment scheduled on Monday with Dr. Gurrola.  She inquired if she can have the required GI testing with him if he orders an EGD.  She stated that Dr. Gurrola's nurse informed her that he does do esophageal manometries and Bravos.  Informed her that I spoke with Dr. Arango yesterday during our meeting.  Informed her that Dr. Arango stated that she can have the testing locally if they are able to do the required testing.  Instructed her to contact Dr. Rogel's office regarding her symptoms and to determine what specific testing is needed.  Patient verbalized her understanding and stated that she will contact Dr. Rogel.  Instructed her to keep us updated on her symptoms and the treatment plan.  She again verbalized her understanding.    7/27/23 - Received the " following message from Dr. Arango:  Syeda Arango, DO  Rebecca Hernandez RN  With those symptoms, she definitely needs the manometry and BRAVO to be done here.  Thanks     7/31/23 - Message with Dr. Arango's instructions sent to patient.    7/31/23 - Received the following message this morning from patient:    Good morning Rebecca. I have an appointment with Dr Gurrola this Thursday at noon. I will keep you updated. I also spoke with the palliative care nurse because Bree seems to think part of the spasms are worsened by anxiety. I think theres some truth to that. She recommended asking the Dr about Buspar. Its safe for people with addiction history and its effective. I begin resuming my pulmonary rehab program today, the new lung Dr here referred me. I will keep you posted. Thanks for all you do!!    My symptoms have eased up since I went back to 4x a day with carafate though. Its definitely the esophageal spasms. Its what I was having initially.     Notified Dr. Arango of patient's messages.

## 2023-07-31 ENCOUNTER — PATIENT MESSAGE (OUTPATIENT)
Dept: TRANSPLANT | Facility: CLINIC | Age: 63
End: 2023-07-31
Payer: MEDICARE

## 2023-08-03 ENCOUNTER — TELEPHONE (OUTPATIENT)
Dept: GASTROENTEROLOGY | Facility: CLINIC | Age: 63
End: 2023-08-03
Payer: MEDICARE

## 2023-08-03 ENCOUNTER — PATIENT MESSAGE (OUTPATIENT)
Dept: TRANSPLANT | Facility: CLINIC | Age: 63
End: 2023-08-03
Payer: MEDICARE

## 2023-08-03 NOTE — TELEPHONE ENCOUNTER
----- Message from Barbara Rowe sent at 8/3/2023 10:17 AM CDT -----  .Type: Patient Call Back    Who called: self     What is the request in detail: patient is requesting a callback regarding EDG     Can the clinic reply by [facility]? call    Would the patient rather a call back or a response via [facility]? call    Best call back number: [phone]     Additional Information:

## 2023-08-03 NOTE — TELEPHONE ENCOUNTER
Ma spoke with pt pt cxl 10/23 procedure with Dr. Zamora , Pt states she will be having her procedure in [place]   Pt states Dr. Rogel is aware

## 2023-08-10 ENCOUNTER — TELEPHONE (OUTPATIENT)
Dept: TRANSPLANT | Facility: CLINIC | Age: 63
End: 2023-08-10
Payer: MEDICARE

## 2023-08-10 ENCOUNTER — PATIENT MESSAGE (OUTPATIENT)
Dept: TRANSPLANT | Facility: CLINIC | Age: 63
End: 2023-08-10
Payer: MEDICARE

## 2023-08-10 DIAGNOSIS — J96.11 CHRONIC RESPIRATORY FAILURE WITH HYPOXIA: ICD-10-CM

## 2023-08-10 DIAGNOSIS — J43.9 PULMONARY EMPHYSEMA WITH FIBROSIS OF LUNG: Primary | ICD-10-CM

## 2023-08-10 DIAGNOSIS — J84.10 PULMONARY EMPHYSEMA WITH FIBROSIS OF LUNG: Primary | ICD-10-CM

## 2023-08-10 DIAGNOSIS — Z76.82 LUNG TRANSPLANT CANDIDATE: ICD-10-CM

## 2023-08-10 NOTE — TELEPHONE ENCOUNTER
Attempted to contact patient to inquire if she has had her tooth extraction per her dentist's recommendation and gynecological exam.  No answer.  Left a message requesting a return call.    Received a return call from patient.  Inquired if she had a gyn exam.  She stated that she did.  She stated that she would contact Dr. Mcintosh office to have someone fax the office note.  She stated that she would also send me his information via [facility], so I can fax a release of information.  Inquired if patient had the required dental work.  Patient stated that she ended up having both teeth extracted and she had partials fabricated.  Notified patient of the team's recommendation for bivalent COVID booster.   Patient stated that she will discuss with her PCP, Dr. Thibodeaux, on Monday when she sees him to see about getting it scheduled locally.  Also informed patient that it has been almost 3 months since she last saw Dr. Arango.  Inquired if she could come for a follow-up appointment this month to see Dr. Arango and for PFTs and a 6 minute walk test.  Patient verbalized her understanding of all discussed.  She stated that she just started pulmonary rehab.  She stated that she goes to rehab on Monday, Wednesdays, and Thursdays.  She requested that we schedule the appointment on a Tuesday morning.  Informed patient that she will be contacted with the date and the times of the appointment.  She again verbalized her understanding.

## 2023-08-11 ENCOUNTER — PATIENT MESSAGE (OUTPATIENT)
Dept: TRANSPLANT | Facility: CLINIC | Age: 63
End: 2023-08-11
Payer: MEDICARE

## 2023-08-28 ENCOUNTER — TELEPHONE (OUTPATIENT)
Dept: TRANSPLANT | Facility: CLINIC | Age: 63
End: 2023-08-28
Payer: MEDICARE

## 2023-08-29 ENCOUNTER — HOSPITAL ENCOUNTER (OUTPATIENT)
Dept: RADIOLOGY | Facility: HOSPITAL | Age: 63
Discharge: HOME | End: 2023-08-29
Attending: INTERNAL MEDICINE
Payer: MEDICARE

## 2023-08-29 ENCOUNTER — HOSPITAL ENCOUNTER (OUTPATIENT)
Dept: PULMONOLOGY | Facility: CLINIC | Age: 63
Discharge: HOME | End: 2023-08-29
Payer: MEDICARE

## 2023-08-29 ENCOUNTER — PATIENT MESSAGE (OUTPATIENT)
Dept: TRANSPLANT | Facility: CLINIC | Age: 63
End: 2023-08-29

## 2023-08-29 ENCOUNTER — TELEPHONE (OUTPATIENT)
Dept: TRANSPLANT | Facility: CLINIC | Age: 63
End: 2023-08-29

## 2023-08-29 ENCOUNTER — OFFICE VISIT (OUTPATIENT)
Dept: TRANSPLANT | Facility: CLINIC | Age: 63
End: 2023-08-29
Payer: MEDICARE

## 2023-08-29 VITALS
HEIGHT: 63 IN | BODY MASS INDEX: 25.75 KG/M2 | HEART RATE: 66 BPM | RESPIRATION RATE: 19 BRPM | SYSTOLIC BLOOD PRESSURE: 166 MMHG | DIASTOLIC BLOOD PRESSURE: 79 MMHG | OXYGEN SATURATION: 99 % | WEIGHT: 145.31 LBS

## 2023-08-29 VITALS — WEIGHT: 145.31 LBS | HEIGHT: 63 IN | BODY MASS INDEX: 25.75 KG/M2

## 2023-08-29 DIAGNOSIS — Z76.82 LUNG TRANSPLANT CANDIDATE: ICD-10-CM

## 2023-08-29 DIAGNOSIS — Z76.82 LUNG TRANSPLANT CANDIDATE: Primary | ICD-10-CM

## 2023-08-29 DIAGNOSIS — J96.11 CHRONIC RESPIRATORY FAILURE WITH HYPOXIA: ICD-10-CM

## 2023-08-29 DIAGNOSIS — J43.9 PULMONARY EMPHYSEMA WITH FIBROSIS OF LUNG: ICD-10-CM

## 2023-08-29 DIAGNOSIS — J84.10 PULMONARY EMPHYSEMA WITH FIBROSIS OF LUNG: ICD-10-CM

## 2023-08-29 DIAGNOSIS — K21.9 GASTROESOPHAGEAL REFLUX DISEASE, UNSPECIFIED WHETHER ESOPHAGITIS PRESENT: ICD-10-CM

## 2023-08-29 DIAGNOSIS — J43.9 COMBINED PULMONARY FIBROSIS AND EMPHYSEMA (CPFE): Primary | ICD-10-CM

## 2023-08-29 DIAGNOSIS — J84.10 COMBINED PULMONARY FIBROSIS AND EMPHYSEMA (CPFE): Primary | ICD-10-CM

## 2023-08-29 DIAGNOSIS — R94.4 DECREASED CREATININE CLEARANCE: ICD-10-CM

## 2023-08-29 LAB
FEF 25 75 LLN: 1.1
FEF 25 75 PRE REF: 19.4 %
FEF 25 75 REF: 2.18
FEV05 LLN: 0.92
FEV05 REF: 1.78
FEV1 FVC LLN: 67
FEV1 FVC PRE REF: 65.7 %
FEV1 FVC REF: 79
FEV1 LLN: 1.8
FEV1 PRE REF: 44.3 %
FEV1 REF: 2.39
FVC LLN: 2.28
FVC PRE REF: 67 %
FVC REF: 3.02
PEF LLN: 4.43
PEF PRE REF: 52.5 %
PEF REF: 6.09
PHYSICIAN COMMENT: ABNORMAL
PRE FEF 25 75: 0.42 L/S (ref 1.1–3.62)
PRE FET 100: 7.54 SEC
PRE FEV05 REF: 42.4 %
PRE FEV1 FVC: 52.14 % (ref 67.19–89.73)
PRE FEV1: 1.06 L (ref 1.8–2.95)
PRE FEV5: 0.75 L (ref 0.92–2.64)
PRE FVC: 2.03 L (ref 2.28–3.8)
PRE PEF: 3.2 L/S (ref 4.43–7.74)

## 2023-08-29 PROCEDURE — 1159F PR MEDICATION LIST DOCUMENTED IN MEDICAL RECORD: ICD-10-PCS | Mod: CPTII,S$GLB,TXP, | Performed by: INTERNAL MEDICINE

## 2023-08-29 PROCEDURE — 1159F MED LIST DOCD IN RCRD: CPT | Mod: CPTII,S$GLB,TXP, | Performed by: INTERNAL MEDICINE

## 2023-08-29 PROCEDURE — 78701 KIDNEY IMAGING WITH FLOW: CPT | Mod: TC,TXP

## 2023-08-29 PROCEDURE — 78701 KIDNEY IMAGING WITH FLOW: CPT | Mod: 26,TXP,, | Performed by: STUDENT IN AN ORGANIZED HEALTH CARE EDUCATION/TRAINING PROGRAM

## 2023-08-29 PROCEDURE — 99999 PR PBB SHADOW E&M-EST. PATIENT-LVL V: ICD-10-PCS | Mod: PBBFAC,TXP,, | Performed by: INTERNAL MEDICINE

## 2023-08-29 PROCEDURE — 94010 BREATHING CAPACITY TEST: ICD-10-PCS | Mod: S$GLB,TXP,, | Performed by: INTERNAL MEDICINE

## 2023-08-29 PROCEDURE — 3008F PR BODY MASS INDEX (BMI) DOCUMENTED: ICD-10-PCS | Mod: CPTII,S$GLB,TXP, | Performed by: INTERNAL MEDICINE

## 2023-08-29 PROCEDURE — 99214 OFFICE O/P EST MOD 30 MIN: CPT | Mod: S$GLB,TXP,, | Performed by: INTERNAL MEDICINE

## 2023-08-29 PROCEDURE — 3078F PR MOST RECENT DIASTOLIC BLOOD PRESSURE < 80 MM HG: ICD-10-PCS | Mod: CPTII,S$GLB,TXP, | Performed by: INTERNAL MEDICINE

## 2023-08-29 PROCEDURE — 3077F PR MOST RECENT SYSTOLIC BLOOD PRESSURE >= 140 MM HG: ICD-10-PCS | Mod: CPTII,S$GLB,TXP, | Performed by: INTERNAL MEDICINE

## 2023-08-29 PROCEDURE — 3008F BODY MASS INDEX DOCD: CPT | Mod: CPTII,S$GLB,TXP, | Performed by: INTERNAL MEDICINE

## 2023-08-29 PROCEDURE — A9567 TECHNETIUM TC-99M AEROSOL: HCPCS | Mod: TXP

## 2023-08-29 PROCEDURE — 94010 BREATHING CAPACITY TEST: CPT | Mod: S$GLB,TXP,, | Performed by: INTERNAL MEDICINE

## 2023-08-29 PROCEDURE — 78701 NM KIDNEY IMAGING MORPH W VASCULAR: ICD-10-PCS | Mod: 26,TXP,, | Performed by: STUDENT IN AN ORGANIZED HEALTH CARE EDUCATION/TRAINING PROGRAM

## 2023-08-29 PROCEDURE — 3044F HG A1C LEVEL LT 7.0%: CPT | Mod: CPTII,S$GLB,TXP, | Performed by: INTERNAL MEDICINE

## 2023-08-29 PROCEDURE — 3077F SYST BP >= 140 MM HG: CPT | Mod: CPTII,S$GLB,TXP, | Performed by: INTERNAL MEDICINE

## 2023-08-29 PROCEDURE — 3044F PR MOST RECENT HEMOGLOBIN A1C LEVEL <7.0%: ICD-10-PCS | Mod: CPTII,S$GLB,TXP, | Performed by: INTERNAL MEDICINE

## 2023-08-29 PROCEDURE — 94618 PULMONARY STRESS TESTING: CPT | Mod: S$GLB,TXP,, | Performed by: INTERNAL MEDICINE

## 2023-08-29 PROCEDURE — 99999 PR PBB SHADOW E&M-EST. PATIENT-LVL V: CPT | Mod: PBBFAC,TXP,, | Performed by: INTERNAL MEDICINE

## 2023-08-29 PROCEDURE — 94618 PULMONARY STRESS TESTING: ICD-10-PCS | Mod: S$GLB,TXP,, | Performed by: INTERNAL MEDICINE

## 2023-08-29 PROCEDURE — 3078F DIAST BP <80 MM HG: CPT | Mod: CPTII,S$GLB,TXP, | Performed by: INTERNAL MEDICINE

## 2023-08-29 PROCEDURE — 99214 PR OFFICE/OUTPT VISIT, EST, LEVL IV, 30-39 MIN: ICD-10-PCS | Mod: S$GLB,TXP,, | Performed by: INTERNAL MEDICINE

## 2023-08-29 RX ORDER — HYOSCYAMINE SULFATE 0.12 MG/1
.125-.25 TABLET SUBLINGUAL EVERY 4 HOURS PRN
COMMUNITY
Start: 2023-08-07

## 2023-08-29 RX ORDER — BUSPIRONE HYDROCHLORIDE 7.5 MG/1
7.5 TABLET ORAL 2 TIMES DAILY
COMMUNITY
Start: 2023-08-14

## 2023-08-29 NOTE — PROGRESS NOTES
LUNG TRANSPLANT PRE FOLLOW-UP    Referring Physician: Denis Rai    Reason for Visit:  Pre-lung transplant follow-up.         Date of Initial Evaluation:                                                                                              History of Present Illness: Antonella Chase is a 63 y.o. female who is on  2-6 L of oxygen. She is on no assisted ventilation.  Her New York Heart Association Class is III and a Karnofsky score of 70% - Cares for self: Unable to carry on normal activity or active work. She is not diabetic.     Patient returns for follow up visit.  She is currently undergoing lung transplant work-up.  She states that her dyspnea is stable.  She does get short of breath with heavy exertion and climbing stairs although she remains very active and is enrolled in pulmonary rehab.  She takes all inhalers and OFEV as directed.  Has GERD and esophageal spasms which she states is under control when she takes her Carafate as directed.  She is scheduled for EGD, BRAVO, and manometry in [place].  Overall, she remains stable.      Review of Systems   Constitutional:  Negative for chills, diaphoresis, fever, malaise/fatigue and weight loss.   HENT:  Negative for congestion, nosebleeds and sinus pain.    Eyes:  Negative for blurred vision.   Respiratory:  Positive for cough (chronic, dry) and shortness of breath (with exertion, worse than previous). Negative for hemoptysis, sputum production and wheezing.    Cardiovascular:  Negative for chest pain, palpitations, orthopnea, claudication and leg swelling.   Gastrointestinal:  Negative for abdominal pain, constipation, diarrhea, heartburn, nausea and vomiting.   Genitourinary:  Negative for dysuria, frequency and urgency.   Musculoskeletal:  Negative for myalgias.   Skin:  Negative for itching and rash.   Neurological:  Negative for dizziness, tremors and headaches.   Psychiatric/Behavioral:  Negative for substance abuse. The patient is not  "nervous/anxious.      Objective:   BP (!) 166/79 (BP Location: Right arm, Patient Position: Sitting, BP Method: Medium (Automatic))   Pulse 66   Ht 5' 3" (1.6 m)   Wt 65.9 kg (145 lb 4.5 oz)   SpO2 99%   BMI 25.74 kg/m²   Physical Exam  Constitutional:       Appearance: She is well-developed.      Interventions: Nasal cannula in place.   HENT:      Head: Normocephalic and atraumatic.   Eyes:      Conjunctiva/sclera: Conjunctivae normal.   Cardiovascular:      Rate and Rhythm: Normal rate and regular rhythm.      Heart sounds: Normal heart sounds.   Pulmonary:      Effort: Pulmonary effort is normal.      Breath sounds: Decreased air movement present. Examination of the right-middle field reveals decreased breath sounds. Examination of the left-middle field reveals decreased breath sounds. Examination of the right-lower field reveals decreased breath sounds. Examination of the left-lower field reveals decreased breath sounds. Decreased breath sounds and rales (trace) present.   Abdominal:      General: Bowel sounds are normal.      Palpations: Abdomen is soft.   Musculoskeletal:         General: Normal range of motion.      Cervical back: Normal range of motion and neck supple.   Skin:     General: Skin is warm and dry.   Neurological:      Mental Status: She is alert and oriented to person, place, and time.       Labs:  Hospital Outpatient Visit on 08/29/2023   Component Date Value    Pre FVC 08/29/2023 2.03 (L)     PRE FEV5 08/29/2023 0.75 (L)     Pre FEV1 08/29/2023 1.06 (L)     Pre FEV1 FVC 08/29/2023 52.14 (L)     Pre FEF 25 75 08/29/2023 0.42 (L)     Pre PEF 08/29/2023 3.20 (L)     Pre  08/29/2023 7.54     FVC Ref 08/29/2023 3.02     FVC LLN 08/29/2023 2.28     FVC Pre Ref 08/29/2023 67.0     FEV05 REF 08/29/2023 1.78     FEV05 LLN 08/29/2023 0.92     PRE FEV05 REF 08/29/2023 42.4     FEV1 Ref 08/29/2023 2.39     FEV1 LLN 08/29/2023 1.80     FEV1 Pre Ref 08/29/2023 44.3     FEV1 FVC Ref 08/29/2023 " 79     FEV1 FVC LLN 08/29/2023 67     FEV1 FVC Pre Ref 08/29/2023 65.7     FEF 25 75 Ref 08/29/2023 2.18     FEF 25 75 LLN 08/29/2023 1.10     FEF 25 75 Pre Ref 08/29/2023 19.4     PEF Ref 08/29/2023 6.09     PEF LLN 08/29/2023 4.43     PEF Pre Ref 08/29/2023 52.5            8/29/2023     9:37 AM 5/15/2023    11:28 AM 3/30/2023    12:24 PM 11/18/2021    10:00 AM 10/20/2020     9:00 AM 5/4/2020     9:16 AM   Pulmonary Function Tests   FVC 2.03 liters 2.07 liters 2.26 liters 2.56 liters 2.31 liters 2.5 liters   FEV1 1.06 liters 1.19 liters 1.26 liters 1.39 liters 1.17 liters 1.32 liters   TLC (liters)  5.16 liters 5.31 liters 4.52 liters 4.36 liters 5.41 liters   DLCO (ml/mmHg sec)  5.54 ml/mmHg sec 6.36 ml/mmHg sec 8.9 ml/mmHg sec 11.7 ml/mmHg sec 9.8 ml/mmHg sec   FVC% 67 68.4 74.4 83 74 76   FEV1% 44 49.6 52.6 57 47 51   FEF 25-75 0.42 0.57 0.59 0.61     FEF 25-75% 19 25.9 27.1 27     TLC%  108.2 111.3 95 91 106   RV  3.04 3.05 1.74 2.03 2.85   RV%  163.8 164.4 95 111 147   DLCO%  25 28.7 40 52 40         8/29/2023     7:59 AM 5/15/2023     9:02 AM 3/30/2023    12:02 PM 12/29/2022    10:56 AM 11/18/2021     9:10 AM 2/4/2021     2:39 PM   6MW   6MWT Status completed without stopping completed without stopping completed without stopping completed without stopping completed without stopping completed without stopping   Patient Reported Dyspnea Chest pain;Dizziness;Dyspnea;Lightheadedness;Leg pain Dyspnea Chest pain;Dyspnea;Leg pain;Lightheadedness Chest pain;Dyspnea Dyspnea;Leg pain;Lightheadedness   Was O2 used? Yes Yes Yes Yes No No   Delivery Method Cannula Cannula;Pull Tank;Continuous Flow Cannula;Pull Tank;Continuous Flow Cannula;Pull Tank;Continuous Flow     6MW Distance walked (feet) 1380 feet 1300 feet 1200 feet 1300 feet 1500 feet 1300 feet   Distance walked (meters) 420.62 meters 396.24 meters 365.76 meters 396.24 meters 457.2 meters 396.24 meters   Did patient stop? No No No No No No   Oxygen Saturation 99  % 96 % 98 % 97 % 96 % 94 %   Supplemental Oxygen 6 L/M 2 L/M 2 L/M 2 L/M Room Air Room Air   Heart Rate 60 bpm 81 bpm 94 bpm 80 bpm 70 bpm 90 bpm   Blood Pressure 159/74 136/65 143/61 160/67 111/56 133/69   Stephanie Dyspnea Rating  very, very light (just noticeable) very light nothing at all nothing at all light very light   Oxygen Saturation 89 % 92 % 90 % 91 % 88 % 89 %   Supplemental Oxygen 6 L/M 6 L/M 4 L/M 2 L/M Room Air Room Air   Heart Rate 92 bpm 95 bpm 100 bpm 81 bpm 91 bpm 105 bpm   Blood Pressure 176/77 153/67 152/73 135/61 140/65 143/70   Stephanie Dyspnea Rating  somewhat heavy heavy somewhat heavy heavy heavy moderate   Recovery Time (seconds) 63 seconds 55 seconds 66 seconds 128 seconds 162 seconds 91 seconds   Oxygen Saturation 97 % 95 % 98 % 98 % 97 % 94 %   Supplemental Oxygen 6 L/M 6 L/M 4 L/M 2 L/M Room Air Room Air   Heart Rate 71 bpm  89 bpm 84 bpm 73 bpm        Cardiodiagnostics:  Results for orders placed during the hospital encounter of 05/15/23    Echo Saline Bubble? No    Interpretation Summary  · The left ventricle is normal in size with normal systolic function.  · The estimated ejection fraction is 65%.  · Normal left ventricular diastolic function.  · Normal right ventricular size with normal right ventricular systolic function.  · Normal central venous pressure (3 mmHg).      Assessment:-  1. Lung transplant candidate    2. Pulmonary emphysema with fibrosis of lung    3. Chronic respiratory failure with hypoxia    4. Gastroesophageal reflux disease, unspecified whether esophagitis present      Plan:   Patient followed for combined COPD/pulmonary fibrosis.  On Breztri, prn Albuterol, and OFEV.  Tolerating therapy.  No recent exacerbations.  Spirometry stable.    Continue with O2 as prescribed.  Needs 6L with exertion.  Will update prescription.  RHC reviewed and she does not have significant PH.    Patient requires [id number] mobile NIV due to chronic respiratory failure and chronic COPD with  chronic hypoxemic respiratory failure.   [id number] provides mobile volume ventilation since supplemental Oxygen alone does not provide enough support.   BiPAP has been considered but ruled out because it is a stationery device that could limit the patient's functional capacity.   Modalities tried include supplemental Oxygen, medications, and inhalation therapies but the patient still requires mobile NIV with a [id number] device.   Failure to provide NIV could lead to an exacerbation of the patient's condition, possible hospital admission, and potential harm to the patient.     Continue with PPI and Carafate as prescribed.  Is scheduled to undergo EGD and BRAVO/Manometry in [facility].   She continues to meet indications for LUT and will continue with the lung transplant work-up.  She is to remain active in pulmonary rehab.  Current concerns include her high PRA and esophageal symptoms.  Will present at selection committee once work-up is complete.    Follow-up in 2 months.      Syeda Arango D.O.  Pulmonary/Critical Care and Lung Transplantation  [facility]

## 2023-08-29 NOTE — TELEPHONE ENCOUNTER
----- Message from Rosy Baeza sent at 8/29/2023 10:25 AM CDT -----  Regarding: Returning a Missed Call  Contact: Antonella Chase  Returning a Missed Call    Caller: Antonella Chase      Returning call to: constantino Fernandez       Caller can be reached @: [phone] (home)      Nature of the call: Patient returning call to coordinator regarding to discuss test results and Dr. Arango's instructions. Requesting a call back.     Contacted patient.  Notified her of the the 24 hour urine results and Dr. Arango's instructions for NM GFR study.  Patient verbalized her understanding.  She stated that she is available anytime except on 9/12/23 and 9/15/23.

## 2023-08-29 NOTE — LETTER
August 29, 2023        Denis Rai  [address] [place] [address]  Phone: [phone]  Fax: [phone]             [facility] - Transplant [facility]  [address]  [facility] [address]  Phone: [phone]   Patient: Antonella Chase   MR Number: [id number]   YOB: 1960   Date of Visit: 8/29/2023       Dear Dr. Denis Rai    Thank you for referring Antonella Chase to me for evaluation. Attached you will find relevant portions of my assessment and plan of care.    If you have questions, please do not hesitate to call me. I look forward to following Antonella Chase along with you.    Sincerely,    Syeda Arango, DO    Enclosure    If you would like to receive this communication electronically, please contact [email or web address] or [phone] to request [employer] access.    [employer] is a tool which provides read-only access to select patient information with whom you have a relationship. Its easy to use and provides real time access to review your patients record including encounter summaries, notes, results, and demographic information.    If you feel you have received this communication in error or would no longer like to receive these types of communications, please e-mail [email or web address]

## 2023-08-29 NOTE — TELEPHONE ENCOUNTER
----- Message from Houston Ocampo sent at 8/29/2023 11:02 AM CDT -----  Norris Fernandez,    It is done.    Terrence

## 2023-08-29 NOTE — TELEPHONE ENCOUNTER
----- Message from Syeda Arango DO sent at 8/29/2023 10:08 AM CDT -----    Just seeing this.  Will need NM renal scan    Attempted to contact patient.  No answer.  Left a message requesting a return call to discuss test results and Dr. Arango's instructions.

## 2023-08-30 NOTE — PROCEDURES
Antonella Chase is a 63 y.o.  female patient, who presents for a 6 minute walk test ordered by DO Conchita.  The diagnosis is Interstitial Lung Disease.  The patient's BMI is 25.7 kg/m2.  Predicted distance (lower limit of normal) is 362 meters.      Test Results:    The test was completed without stopping. The total time walked was 360 seconds. During walking, the patient reported:  Dyspnea. The patient used supplemental oxygen during testing.     08/29/2023---------Distance: 420.62 meters (1380 feet)     Lap Walk Time O2 Sat % Supplemental Oxygen Heart Rate Blood Pressure Stephanie Scale Comment   Pre-exercise  (Resting) 0 0 99 % 6 L/M 60 bpm 159/74 mmHg 0.5    During Exercise 1 55 sec 99 % 6 L/M 77 bpm      During Exercise 2 103 sec 95 % 6 L/M 83 bpm      During Exercise 3 156 sec 92 % 6 L/M 85 bpm      During Exercise 4 208 sec 92 % 6 L/M 89 bpm      During Exercise 5 263 sec 90 % 6 L/M 94 bpm      During Exercise 6 314 sec 90 % 6 L/M 94 bpm      End of Exercise  360 sec 89 % 6 L/M 92 bpm 176/77 mmHg 4    Post-exercise  (Recovery)   97 % 6 L/M  71 bpm        Recovery Time: 63 seconds    Performing nurse/tech: Rk DUENAS      PREVIOUS STUDY:   05/15/2023---------Distance: 396.24 meters (1300 feet)       Lap Walk Time O2 Sat % Supplemental Oxygen Heart Rate Blood Pressure Stephanie Scale Comment   Pre-exercise  (Resting) 0 0 96 % 2 L/M 81 bpm 136/65 mmHg 1     During Exercise 1 50 sec 93 % 2 L/M 89 bpm         During Exercise 2 106 sec 87 % 2 L/M 97 bpm     Oxygen Increased   During Exercise 3 161 sec 86 % 3 L/M 90 bpm     Oxygen Increased   During Exercise 4 225 sec 87 % 4 L/M 94 bpm     Oxygen Increased   During Exercise 5 280 sec 91 % 6 L/M 95 bpm         During Exercise 6 331 sec 91 % 6 L/M 94 bpm         End of Exercise   360 sec 92 % 6 L/M 95 bpm 153/67 mmHg 5-6     Post-exercise  (Recovery)     95 % 6 L/M  86 bpm             CLINICAL INTERPRETATION:  Six minute walk distance is 420.62 meters (1380 feet) with  somewhat heavy dyspnea.  During exercise, there was significant desaturation while breathing supplemental oxygen.  Blood pressure remained stable and Heart rate increased significantly with walking.  Hypertension was present prior to exercise.  The patient did not report non-pulmonary symptoms during exercise.  Since the previous study in May 2023, exercise capacity is unchanged.  Based upon age and body mass index, exercise capacity is normal.

## 2023-08-31 ENCOUNTER — TELEPHONE (OUTPATIENT)
Dept: TRANSPLANT | Facility: CLINIC | Age: 63
End: 2023-08-31
Payer: MEDICARE

## 2023-08-31 NOTE — TELEPHONE ENCOUNTER
RUCHI faxed updated orders to pt's established oxygen provider [facility] (ph: [phone], fx: [phone]). Confirmed orders were received. RUCHI following and available.       ----- Message from Rebecca Hernandez RN sent at 8/30/2023  4:04 PM CDT -----  Regarding: oxygen orders  New orders for oxygen increase to 6 liters with activity have been entered per Dr. Arango's request. I used the 6 MWT results from May since the one from yesterday is not resulted and was done on 6 liters.  The fax number per patient is [phone].     Thanks,  sl

## 2023-09-08 ENCOUNTER — PATIENT MESSAGE (OUTPATIENT)
Dept: TRANSPLANT | Facility: CLINIC | Age: 63
End: 2023-09-08
Payer: MEDICARE

## 2023-09-11 ENCOUNTER — TELEPHONE (OUTPATIENT)
Dept: TRANSPLANT | Facility: CLINIC | Age: 63
End: 2023-09-11
Payer: MEDICARE

## 2023-09-11 NOTE — TELEPHONE ENCOUNTER
----- Message from Syeda Castellano sent at 9/11/2023 10:11 AM CDT -----  Regarding: Patient advice  Contact: Pt [phone]    Name of Caller:   Antonella Chase     Contact Preference: [phone]    Nature of Call:   Called to give update that there was a mix up with scheduling  EDG trying to get it rescheduled for 09/14/23 would like to discuss     Attempted to contact patient.  No answer.  Left a message requesting a return call.    Received a return call for patient. Patient stated that she received a call on Friday stating that the EGD with Ramon was scheduled at the doctor's office instead of [facility].  She stated that the procedure will be rescheduled this week.  Instructed patient to send a message confirming the dates of the procedures.  She verbalized her understanding.

## 2023-09-14 ENCOUNTER — PATIENT MESSAGE (OUTPATIENT)
Dept: TRANSPLANT | Facility: CLINIC | Age: 63
End: 2023-09-14
Payer: MEDICARE

## 2023-09-14 ENCOUNTER — TELEPHONE (OUTPATIENT)
Dept: TRANSPLANT | Facility: CLINIC | Age: 63
End: 2023-09-14
Payer: MEDICARE

## 2023-09-14 NOTE — TELEPHONE ENCOUNTER
Called the office of the patient's local GI provider using the telephone number ([phone]) she provided and spoke with Amari. Gave Amari the fax number to our office and asked that she send the result reports for manometry, EGD, and Bravo probe once available. Amari stated she will send the manometry results once the test has been read by a provider and the Bravo results next week once the patient completes the test and it has been read by a provider.

## 2023-09-14 NOTE — TELEPHONE ENCOUNTER
"Returned call to patient who reported having manometry done on Tuesday, 9/12 and the EGD with Bravo probe insertion today. She added that the Bravo test will be complete on Saturday and she will turn in the device to Dr. Gurrola's office on Monday, 9/18. She shared that Dr. Gurrola told her she has a hiatal hernia that may require repair before lung transplant surgery. Patient stated she will send a portal message including a picture of the EGD report and a contact telephone number for us to call and request the procedure results.         ----- Message from Stephen Rush sent at 9/14/2023 12:47 PM CDT -----  Consult/Advisory:          Name Of Caller: Self      Contact Preference?: [phone]       What is the nature of the call?: Calling to speak w/ Rebecca about EGD Bravo report          Additional Notes:  "Thank you for all that you do for our patients"      "

## 2023-09-20 ENCOUNTER — PATIENT MESSAGE (OUTPATIENT)
Dept: TRANSPLANT | Facility: CLINIC | Age: 63
End: 2023-09-20
Payer: MEDICARE

## 2023-09-21 ENCOUNTER — TELEPHONE (OUTPATIENT)
Dept: TRANSPLANT | Facility: CLINIC | Age: 63
End: 2023-09-21
Payer: MEDICARE

## 2023-09-21 NOTE — TELEPHONE ENCOUNTER
----- Message from Syeda Gray sent at 9/21/2023  2:19 PM CDT -----  Regarding: Patient advice  Contact: Pt [phone]    Name of Caller:  Antonella JOHANNE    Contact Preference:   [phone]    Nature of Call:    States a request is needed for release of record for EGD results  Please fax request to  [phone] Atten: Maritza     Release of information for EGD / Bravo results faxed to [facility].

## 2023-09-26 ENCOUNTER — COMMITTEE REVIEW (OUTPATIENT)
Dept: TRANSPLANT | Facility: CLINIC | Age: 63
End: 2023-09-26
Payer: MEDICARE

## 2023-09-26 ENCOUNTER — TELEPHONE (OUTPATIENT)
Dept: TRANSPLANT | Facility: CLINIC | Age: 63
End: 2023-09-26
Payer: MEDICARE

## 2023-09-26 ENCOUNTER — PATIENT MESSAGE (OUTPATIENT)
Dept: TRANSPLANT | Facility: CLINIC | Age: 63
End: 2023-09-26
Payer: MEDICARE

## 2023-09-26 NOTE — COMMITTEE REVIEW
Native Organ Dx: Combined Emphysema / IIP: Idiopathic Pulmonary Fibrosis (IPF)    Approved:  Antonella Chase was presented to the selection committee for a diagnosis of combined emphysema / pulmonary fibrosis. All members present agreed that patient is a suitable candidate for a bilateral sequential lung, right single lung, or left single lung transplantation. Plan is to proceed with listing once financial clearance is obtained. A prospective crossmatch will be needed at the time of the transplant event.      I was present and participated in the lung transplant committee discussion on the above patient and agree with the recommendations.

## 2023-09-26 NOTE — TELEPHONE ENCOUNTER
Contacted patient and notified her of the outcome of the selection committee meeting today. Informed her that she has been approved for a bilateral lung, right single lung, or left single lung transplant. Reminded patient of her elevated cPRA (78%). Also reminded her that it may take some time to find a suitable donor for her. Reminded her that she has the option to consider other transplant centers for dual listing to potentially increase her transplant opportunities. Informed patient that we need to obtain financial clearance / authorization through her insurance company. Informed her that she will not be placed on the lung transplant list until financial clearance is received. Informed her that I would contact her once I receive the clearance notification. Informed patient that she will be seen in clinic every 2 months once she is listed, and we will need to draw labs every visit to have a current lab sample on hand for HLA testing. Notified her of our policy for COVID vaccination per CDC guidelines. Informed her that a new monovalent booster just came out. Instructed her to contact us should she receive any vaccination because she will need repeat HLA testing 15 days after any vaccination. She verbalized her understanding of all discussed. Patient asked questions which were answered to her satisfaction.

## 2023-09-27 NOTE — PROGRESS NOTES
PHARM.D. PRE-TRANSPLANT NOTE:    This patient's medication therapy was evaluated as part of her pre-transplant evaluation.        This patient's medication profile was reviewed for considerations for DAA Hepatitis C therapy:    No current inducers of CYP 3A4 or PGP     No amiodarone on this patient's EMR profile in the last 24 months  No past or current atrial fibrillation on this patient's EMR profile       Current Outpatient Medications   Medication Sig Dispense Refill    albuterol (ACCUNEB) 1.25 mg/3 mL Nebu Take 3 mLs by nebulization every 6 (six) hours as needed.      albuterol (PROVENTIL) 2.5 mg /3 mL (0.083 %) nebulizer solution Take 2.5 mg by nebulization every 4 (four) hours as needed.      albuterol (PROVENTIL/VENTOLIN HFA) 90 mcg/actuation inhaler Inhale 2 puffs into the lungs every 4 (four) hours as needed.      aspirin (ECOTRIN) 81 MG EC tablet Take 81 mg by mouth Daily.      BREZTRI AEROSPHERE 160-9-4.8 mcg/actuation HFAA Inhale 2 puffs into the lungs 2 (two) times a day.      busPIRone (BUSPAR) 7.5 MG tablet Take 7.5 mg by mouth 2 (two) times daily.      famotidine (PEPCID) 40 MG tablet Take 40 mg by mouth every evening.      hydrOXYzine HCL (ATARAX) 25 MG tablet Take 25 mg by mouth nightly as needed. For hives      hyoscyamine (LEVSIN/SL) 0.125 mg Subl Place 0.125-0.25 mg under the tongue every 4 (four) hours as needed.      ibuprofen (ADVIL,MOTRIN) 600 MG tablet Take 600 mg by mouth 4 (four) times daily. Prn  / sparingly due to diverticulitis      ipratropium (ATROVENT) 0.02 % nebulizer solution Take by nebulization every 4 (four) hours as needed.      LINZESS 72 mcg Cap capsule Take 72 mcg by mouth daily as needed.      naloxone (NARCAN) 4 mg/actuation Spry USE 1 SPRAY in nose ONCE. MAY REPEAT every 2 TO 3 minutes UNTIL patient responds      NEXLIZET 180-10 mg Tab Take 1 tablet by mouth once daily.      nystatin (MYCOSTATIN) powder TO THE AFFECTED AREA(S) TWICE DAILY      nystatin-triamcinolone  (MYCOLOG) ointment TO THE AFFECTED AREA(S) TWICE DAILY      OFEV 150 mg Cap Take 150 mg by mouth 2 (two) times a day.      pantoprazole (PROTONIX) 40 MG tablet Take 40 mg by mouth once daily.      predniSONE (DELTASONE) 20 MG tablet Take 2 tablets by mouth once daily.      sucralfate (CARAFATE) 1 gram tablet Take 1 g by mouth 4 (four) times daily.      traMADoL (ULTRAM) 50 mg tablet Take 50 mg by mouth every 12 (twelve) hours as needed.      tretinoin (RETIN-A) 0.05 % cream apply a SMALL AMOUNT TO acne prone face nightly      varicella-zoster [id number], PF, (SHINGRIX) 50 mcg/0.5 mL injection Inject into the muscle. (Patient not taking: Reported on 8/29/2023) 1 each 1     Current Facility-Administered Medications   Medication Dose Route Frequency Provider Last Rate Last Admin    sodium chloride 0.9% flush 10 mL  10 mL Intravenous PRN Polo Blackburn MD         I am available for consultation and can be contacted, as needed by the other members of the Transplant team.

## 2023-09-28 ENCOUNTER — TELEPHONE (OUTPATIENT)
Dept: TRANSPLANT | Facility: CLINIC | Age: 63
End: 2023-09-28
Payer: MEDICARE

## 2023-09-28 ENCOUNTER — PATIENT MESSAGE (OUTPATIENT)
Dept: TRANSPLANT | Facility: CLINIC | Age: 63
End: 2023-09-28
Payer: MEDICARE

## 2023-09-28 NOTE — TELEPHONE ENCOUNTER
"----- Message from Padmini Fofana sent at 9/28/2023  4:08 PM CDT -----  Regarding: Speak to coordinator  Contact: Antonella  Regarding:speak to coordinator          Name Of Caller: Antonella        Contact Preference: [phone] (home)        Nature of call:  pt is calling to speak to coordinator regarding; some test she made need to take for her hiatal hernia that she is concerned about, before going to her doctor's appointment on tomorrow, please advise. Requesting a call back,    Contacted patient. She stated that she has a follow-up with Dr. Gurrola, her GI doctor, tomorrow. She stated that Dr. Gurrola told her after the GI testing that her "hiatal hernia had grown significantly". She stated that Dr. Gurrola thought that the hiatal hernia may be an issue for lung transplant. Patient stated, "I don't want to delay things or listing, but I am thinking long term. I don't want this to be an issue." Informed patient that I would notify Dr. Arango of our conversation and I would forward all of the test results to Dr. Rogel in GI for her to review and weigh in. Patient verbalized her understanding.    EGD, esophageal manometry, and Bravo results sent to Dr. Rogel for review.    9/29/23 - Received the below response from Dr. Arango:    Syeda Arango, eRbecca Peacock RN  Thank you.  Will see what Dr. Rogel says.  Her hiatal hernia can be surgically corrected after transplant if needed.  Since she does not have any associated GERD then there should not be an issue immediately.     Message sent to patient via [facility] regarding Dr. Arango's response.              "

## 2023-09-29 ENCOUNTER — PATIENT MESSAGE (OUTPATIENT)
Dept: TRANSPLANT | Facility: CLINIC | Age: 63
End: 2023-09-29
Payer: MEDICARE

## 2023-10-03 ENCOUNTER — TELEPHONE (OUTPATIENT)
Dept: GASTROENTEROLOGY | Facility: HOSPITAL | Age: 63
End: 2023-10-03
Payer: MEDICARE

## 2023-10-03 NOTE — TELEPHONE ENCOUNTER
[facility]    Outside GI workup reviewed.    EGD showed ulcers and esophagitis at the GI junction.    48 hour Bravo study was negative and was performed off of acid reflux medications.    Esophageal manometry was normal.    I think that it is likely that the patient does have real GERD despite her negative 48 hour Ramon as she continues to have esophagitis and esophageal ulcers at the GE junction on endoscopy.  Consider antireflux surgery in the future.    Kalyn Rogel MD

## 2023-10-05 ENCOUNTER — TELEPHONE (OUTPATIENT)
Dept: TRANSPLANT | Facility: CLINIC | Age: 63
End: 2023-10-05
Payer: MEDICARE

## 2023-10-05 DIAGNOSIS — J84.10 PULMONARY EMPHYSEMA WITH FIBROSIS OF LUNG: Primary | ICD-10-CM

## 2023-10-05 DIAGNOSIS — J44.9 CHRONIC OBSTRUCTIVE PULMONARY DISEASE, UNSPECIFIED COPD TYPE: ICD-10-CM

## 2023-10-05 DIAGNOSIS — Z76.82 AWAITING TRANSPLANTATION OF LUNG: ICD-10-CM

## 2023-10-05 DIAGNOSIS — J43.9 PULMONARY EMPHYSEMA WITH FIBROSIS OF LUNG: Primary | ICD-10-CM

## 2023-10-05 DIAGNOSIS — R76.0 ELEVATED ANTIBODY LEVELS: ICD-10-CM

## 2023-10-05 NOTE — TELEPHONE ENCOUNTER
UNET updated with cardiac output and index from catheterization dated 06/23/23. JAGJIT 23.2242.    ----- Message from Polo Sanon MD sent at 10/5/2023  3:02 PM CDT -----  Regarding: RE: Lung transplant listing  Rebecca    My apologies. Our reporting system sometimes doesn't transfer the data. I have addended the report, the output was 4.7, the index 2.8    Polo    ----- Message -----  From: Rebecca Hernandez, HENRIQUE  Sent: 10/5/2023   2:18 PM CDT  To: Polo Sanon MD; Sheryl Cuellar Staff  Subject: Lung transplant listing                          Ms. Antonella Chase has been approved for listing for lung transplant and we need her cardiac output and index numbers from her recent heart catheterization dated 6/23/23 for listing purposes.  Do you have this information?  It is not listed on the cardiac catheterization report.    Thanks,  Rebecca

## 2023-10-05 NOTE — TELEPHONE ENCOUNTER
LISTING NOTE    Received financial clearance.  Contacted Antonella Salvatore.  Informed her that we will proceed with listing today.  Patient agreeable to being listed.  Inquired if patient is willing to accept PHS risk and Hepatitis C positive donors. Patient accepted PHS risk donors and accepted Hepatitis C positive donors.  Inquired if she is able to talk. Patient stated that she is currently eating with her family at a restaurant. She will contact me once she is on her way home.    Received a return call from patient. Verified and updated the patients name, date of birth, social security number, zip code, medications, allergies, and telephone numbers. Patient informed me that she was recently treated for pneumonia with Levaquin for 5 days. She stated that she developed chest pain on the weekend and went to the ER. She stated that the ER prescription prescribed 2 more days of Levaquin and prescribed Prednisone 40 mg daily for 5 days. Patient stated that she completed both the Levaquin and Prednisone. Patient is already taking enteric coated Aspirin 81 mg by mouth daily. Notified her of our policy for listed patients to take Aspirin (EC) 81 mg daily. Informed patient that I will call her back with further instructions once the wait list process is completed. She verbalized her understanding of all discussed.      Patient listed today per order from Dr. Arango. Called patient to discuss her wait list status. Reminded patient that she can be contacted at any time with an organ offer. Provided additional instructions - notify us about any changes to medications, insurance coverage or telephone numbers; contact us at any time with any symptoms of infection or lung disease exacerbation so a transplant pulmonologist can direct treatment; carry one of our business cards at all times in case emergent care is needed at another healthcare facility - ask the treating physician to contact the Lung Transplant Physician on call at  "our 24-hour number [phone] to discuss treatment plan and possible transfer to [facility]; always have your cell phone charged and audible; answer your cell phone even if the number is unfamiliar and ask your caregivers to do the same; notify us of any travel away from your home; stay within a 7-hour drive of [facility]. Notified patient of our policy to follow CDC guidelines for COVID vaccination. Also instructed patient to contact me should she receive any vaccinations or blood transfusions locally. Informed patient that she will need to have repeat HLA PRA screens 15 days after any desensitizing event. She stated that she will get the updated COVID booster. Informed patient that she will be scheduled for a follow-up appointment this month and we will repeat labs at that visit (provided it is 15 days after any vaccination) to have a current sample on hand. Reminded patient that transplant surgery could be called off at any time prior to the actual surgery, resulting in return home to continue waiting for another organ offer. All questions were answered to the patients satisfaction and she verbalized understanding of all information discussed.     Inquired if patient received the [id number] device and is using it. She stated that she received it approximately 2 weeks ago and she "loves it".    Diagnosis: Combined pulmonary fibrosis and emphysema  ABO: A POS  JAGJIT Subscore:: 22.7317 (default values for missing data - CO & CI); 23.2242 (after the CO & CI values received and entered)  CPRA: 78%  Class I Specificity: B7(6385), B81(5764), B67(5099), B42(4495), B55(3915), B27(3571), B56(3467), B73(3011), B54(2487), B60(2356), B82(2348), WEAK----B61(1963), B48(1944)  Class II Specificity: DQ6(28324), DQ5(13310), DQ4(3696), DR51(2244), WEAK---(1586)     Unacceptable antigens to be listed per Dr. Arango's recommendations below:    B7, B81, B67, B42, B55, B27, B56, B73, B54, B60, B82, DQ6, DQ5, " "DQ4, DR51    Message sent to Yun Thibodeaux and Jose regarding unacceptable antigens.    Crossmatch needed: type of crossmatch: prospective    Ht Readings from Last 1 Encounters:   08/29/23 5' 3" (1.6 m)     Wt Readings from Last 1 Encounters:   08/29/23 65.9 kg (145 lb 4.5 oz)     BMI: Estimated body mass index is 25.74 kg/m² as calculated from the following:    Height as of 8/29/23: 5' 3" (1.6 m).    Weight as of 8/29/23: 65.9 kg (145 lb 4.5 oz).  "

## 2023-10-05 NOTE — TELEPHONE ENCOUNTER
Patient requested a new nebulizer and supplies. She stated that her nebulizer is old and she has been having issues with it.

## 2023-10-05 NOTE — TELEPHONE ENCOUNTER
"LUNG WAIT LISTING NOTE    Date of Financial clearance to list: 10/05/23    Demographic Information  [facility]:       Organ: Lung    Last Name: Salvatore  First Name: Antonella       : 1960     Gender: female  MRN#: [id number]              State of Permanent Residence: [address] [place] [id number]    Ethnicity: Not  or /a   Race: White    Clinical Information    ABO  A POS     ABO Confirmation: (THESE DATES MUST BE PRIOR TO THE LIST DATE AND SUPPORTED BY SEPARATE LAB REPORTS)    Accept an Intended Blood Group Incompatible Organ? N/A    Internal Results    Lab Results   Component Value Date    GROUPTRH A POS 2023    GROUPTRH A POS 2023     No results found for: "ABO2"    VITALS  Height: 5'3"     Weight: 65.9 kg    Date: 23  (Use the most recent height from the last transplant encounter).    HLA    Class I:  Lab Results   Component Value Date    SSPW2ZD 3 2023    ZBHK9BU 11 2023    QGCL2FR 8 2023    XOED3YK 35 2023    YKQZW9NX 6 2023    JFOKX4GK XX 2023    RTXAZ5GM 4 2023    UICUM5CV 7 2023       Class II:  Lab Results   Component Value Date    ZWPXAH63TL 17 2023    TJNLBF58ZE 4 2023    YWXPOC836TS 52 2023    TPDGRV1908 53 2023    XDJLQ9VV 2 2023    VAYQU7OC 7 2023       Lung Organ Information  Candidate Medical Urgency Status: Active    Lung Diagnosis Code: Pulmonary Fibrosis, Other, COPD/Emphysema    Prior Living Donor: no    Functional Status:Performs activities of daily living with SOME assistance  Date: 23  Diabetes: Not diabetic  Date: 23  Assisted Ventilation: No assisted ventilation needed Date: 23  Requires Supplemental O2: At rest   On high flow nasal cannula?: no   Amount:  2 L/min Date: 23   How was the value obtained: Read from oxygen delivery device     Pulmonary Function Test Date: 23   Actual Forced Vital Capacity (FVC): 2.03 " liters   Percent Predicted FVC: 67 % Rounded decimal to whole number equals 67   Pre-Bronchodilator Actual FEV1: 1.06 liters   Pre-Bronchodilator Percent Predicted FEV1: 44.3 % Rounded decimal to whole  number equals 44   Post Bronchodilator Actual FEV1: N/A   Post Bronchodilator Percent Predicted FEV1: N/A    Six Minute Walk Distance: 1380 feet    Test date:23    Most Recent Heart Catheterization Date: 23   Pulmonary Artery Systolic Pressure: 35 mmHg   Pulmonary Artery Diastolic Pressure: 6 mmHg   Mean Pulmonary Artery Pressure: 21 mmHg   Pulmonary Capillary Wedge Mean: 10 mmHg   Cardiac Output (CO): n/a L/min - data not documented on report - message sent to Cardiology   Cardiac Index (CI): n/a L/min/m2  data not documented on report - message sent to Cardiology  Central Venous Pressure (CVP): 4 mmHg   Test date:23    Hgb/Hct Tests   Hemoglobin (Hgb):   Lab Results   Component Value Date    HGB 13.2 2023      Hematocrit (Hct):   Lab Results   Component Value Date    HCT 39.7 2023       Round Hct decimal to whole number equals: 40    Current Blood Gas Information   POC PH   Date Value Ref Range Status   2023 7.398 7.35 - 7.45 Final     POC PCO2   Date Value Ref Range Status   2023 42.4 35 - 45 mmHg Final     POC PO2   Date Value Ref Range Status   2023 121 (H) 80 - 100 mmHg Final     POC HCO3   Date Value Ref Range Status   2023 26.1 24 - 28 mmol/L Final     POC SATURATED O2   Date Value Ref Range Status   2023 99 95 - 100 % Final     POC BE   Date Value Ref Range Status   2023 1 -2 to 2 mmol/L Final     Flow   Date Value Ref Range Status   2023 3  Final     Sample   Date Value Ref Range Status   2023 ARTERIAL  Final     Specimen collection time 14:48   PH result needs to be rounded to two decimal places equalin.40  PCO2 needs to be rounded to a whole number equalin  PO2 needs to be rounded to a whole number equalin  With  Supplemental O2: yes  Amount:  3 L/min Date: 06/12/23     Current Serum Creatinine Information  Lab Results   Component Value Date    CREATININE 0.7 06/23/2023     Specimen collection time 06:50    Current Total Billirubin Information  Lab Results   Component Value Date    BILITOT 0.4 06/12/2023     Specimen collection time 07:49    Transplant Specific Information  Lung preference: Bilateral, Right single, and Left single  Preliminary Crossmatch Required:  Yes: Prospective  Number of previous Lung Transplants: 0  Additional Organs Registered? None    REMINDER: Verify in UNOS that each organ registration has the appropriate Additional Organ box checked.    Lung Donor Acceptance Criteria   Donor Characteristics  Minimum acceptable donor age: 12 Years   Maximum acceptable donor age: 65 Years   Minimum acceptable donor height: 48 inches   Maximum acceptable donor height: 96 inches   Donor gender requirements: Either   Accept DCD donor? No     Medical and Social History  Accept a donor with cigarette use > 20 pack years ever? Yes    Infectious Diseases  Accept HBcAB Positive Organ: Yes  Accept HBV JAHAIRA Positive Organ: No  Accept HCV Antibody Positive Organ:Yes  Accept HCV JAHAIRA Positive Organ: Yes    Maximum miles the organ or recovery team will travel: 300 [place]    Unacceptable Antigens: Yes  B7, B81, B67, B42, B55, B27, B56, B73, B54, B60, B82, DQ6, DQ5, DQ4, DR51    Blood Type x2 was verified by myself and Jelena Hernandez RN.  Blood type determination and reporting was completed according to the programs protocols and OPTN requirements.

## 2023-10-06 ENCOUNTER — DOCUMENTATION ONLY (OUTPATIENT)
Dept: TRANSFUSION MEDICINE | Facility: HOSPITAL | Age: 63
End: 2023-10-06
Payer: MEDICARE

## 2023-10-06 ENCOUNTER — TELEPHONE (OUTPATIENT)
Dept: TRANSPLANT | Facility: CLINIC | Age: 63
End: 2023-10-06
Payer: MEDICARE

## 2023-10-06 ENCOUNTER — PATIENT MESSAGE (OUTPATIENT)
Dept: TRANSPLANT | Facility: CLINIC | Age: 63
End: 2023-10-06
Payer: MEDICARE

## 2023-10-06 NOTE — PROGRESS NOTES
"[facility] TRANSFUSION MEDICINE  Section of Transfusion Medicine and Histocompatibility  HLA Note    Case Details   Diagnosis:  No primary diagnosis found.  Blood Type: A POS  HLA Type:   Class I:  Lab Results   Component Value Date    GERG3KL 3 06/13/2023    DWQB4BM 11 06/13/2023    QOGC2SB 8 06/13/2023    QLXP1BQ 35 06/13/2023    MKCWN9DB 6 06/13/2023    DSYNO8LU XX 06/13/2023    GYCMY5XT 4 06/13/2023    XOSHF3OP 7 06/13/2023     Class II:  Lab Results   Component Value Date    OIJDMR70UK 17 06/13/2023    LDWHUP83KQ 4 06/13/2023    BFDCBW789LY 52 06/13/2023    JGOBNS3742 53 06/13/2023    FJGVH0ED 2 06/13/2023    WQRJZ0CH 7 06/13/2023     Recent Antibody Screen/ID Results:   Lab Results   Component Value Date    RE7FLRE B7,B81,B67,B42,B55,B27,B56,B73,B54,B60,B82 06/13/2023    CIABCLM  06/13/2023     B7(6385), B81(5764), B67(5099), B42(4495), B55(3915), B27(3571), B56(3467), B73(3011), B54(2487), B60(2356), B82(2348), WEAK----B61(1963), B48(1944)    CIIAB DQ6,DQ5,DQ4,DR51 06/13/2023    ABCMT  06/13/2023     DQ6(34037), DQ5(78713), DQ4(3696), DR51(2244), WEAK---(1586)     Auto T Cell Crossmatch Results:  Lab Results   Component Value Date    XMTCELLRES Negative 06/13/2023     Auto B Cell Crossmatch Results:  Lab Results   Component Value Date    BCELLRES Negative 06/13/2023     Assessment     Interpretation: This patient is immunized to HLA, with several specificities having MFIs over 5000. A virtual crossmatch is recommended. There are others which are below 5000 MFI but one is "clustered" (DQ4) and several share epitopes with antigens over 5000 MFI.     Strongly Recommended Unacceptable Antigens: B7, 81, 67; DQ5, 6    Optional Unacceptable Antigens: DQ4    Crossmatch Expectations: (Given strongly recommended unacceptable antigens) A retrospective or prospective flow cytometric crossmatch is expected to be negative but could be positive if DQ4, WB13O24, B56, B73 or B55 are donor types (the virtual crossmatch will " "be "positive" with these antigens.  Please call the HLA Lab [phone] with any concerns or questions.    STEFFEN Garcia MD, ZELDA  Section of Transfusion Medicine & Histocompatibility  Department of Pathology and Laboratory Medicine  [facility]  10/06/2023              "

## 2023-10-06 NOTE — TELEPHONE ENCOUNTER
RUCHI faxed orders to pt's oxygen provider Cristina (ph: [phone], fx: [phone]). RUCHI confirmed via telephone that orders were receieved and are being processed. RUCHI following and available.       ----- Message from Rebecca Hernandez RN sent at 10/5/2023  5:43 PM CDT -----  Regarding: nebulizer and supplies  Patient needs a new nebulizer and supplies. Orders have been entered.    Thanks,  sl

## 2023-10-09 ENCOUNTER — PATIENT MESSAGE (OUTPATIENT)
Dept: TRANSPLANT | Facility: CLINIC | Age: 63
End: 2023-10-09
Payer: MEDICARE

## 2023-10-12 ENCOUNTER — TELEPHONE (OUTPATIENT)
Dept: TRANSPLANT | Facility: CLINIC | Age: 63
End: 2023-10-12
Payer: MEDICARE

## 2023-10-12 ENCOUNTER — PATIENT MESSAGE (OUTPATIENT)
Dept: TRANSPLANT | Facility: CLINIC | Age: 63
End: 2023-10-12
Payer: MEDICARE

## 2023-10-12 NOTE — TELEPHONE ENCOUNTER
----- Message from Sherine Kelsey RN sent at 10/12/2023  2:47 PM CDT -----  Regarding: FW: dental procedure antbx?  Contact:  [phone]    ----- Message -----  From: Jaclyn Hannah  Sent: 10/12/2023   2:44 PM CDT  To: [facility] Heart Transplant Clinical Support Staff  Subject: dental procedure antbx?                          The patient called requesting to speak to Nurse Rebecca  says she is at the dentist they are removing a tooth - she needs to talk to you about them giving her an antbx precautionary (no infection)  Please call at your earliest opportunity    No further information provided      Patient can be contacted @# [phone]     Contacted patient. She stated that she is at the oral surgeon's office for the tooth extraction. She stated that there isn't any infection, but the physician may prescribe antibiotics as a precaution. Instructed patient to either call back or send a message via [facility] if any medications are prescribed. She verbalized her understanding.

## 2023-10-16 ENCOUNTER — TELEPHONE (OUTPATIENT)
Dept: TRANSPLANT | Facility: CLINIC | Age: 63
End: 2023-10-16
Payer: MEDICARE

## 2023-10-16 NOTE — TELEPHONE ENCOUNTER
----- Message from Syeda Arango DO sent at 10/16/2023 12:05 PM CDT -----    Reviewed.  Thanks.  Appears that she is healing well and has no contraindications to transplant.  Thanks    ----- Message -----  From: Rebecca Hernandez RN  Sent: 10/16/2023  10:54 AM CDT  To: Syeda Arango, DO    Please view the attachment that patient sent. It is a statement from her oral surgeon.

## 2023-10-19 ENCOUNTER — TELEPHONE (OUTPATIENT)
Dept: TRANSPLANT | Facility: CLINIC | Age: 63
End: 2023-10-19
Payer: MEDICARE

## 2023-10-19 RX ORDER — AMOXICILLIN AND CLAVULANATE POTASSIUM 875; 125 MG/1; MG/1
1 TABLET, FILM COATED ORAL 2 TIMES DAILY
COMMUNITY
Start: 2023-10-12 | End: 2023-10-18

## 2023-10-19 RX ORDER — CEPHALEXIN 500 MG/1
500 CAPSULE ORAL 3 TIMES DAILY
COMMUNITY
Start: 2023-10-19 | End: 2023-10-26

## 2023-10-19 NOTE — TELEPHONE ENCOUNTER
"----- Message -----  From: Asia Osborn  Sent: 10/19/2023  10:50 AM CDT  To: [facility] Lung Transplant  Subject: Update                                           Name Of Caller:     Antonella    Contact Preference:    [phone]    Nature of call:   Pt wants to provide an update to Rebecca. She has a possible infection that developed after her tooth extraction on 10/12. Pt also has questions about her upcoming appt.    Contacted patient. She stated that her mouth has been hurting for the last 2 - 3 days where the tooth was extracted. There is also a "knot" and her bottom jaw is still slightly swollen. She stated, "It was not getting better as quick as I thought it would, so I called my dentist and she saw me today. She did an irrigation to the area so she can see, and I liked to jump out of the chair. My dentist recommended that I see the oral surgeon, so I called him. I went at 1pm to see him. He told me that everything looks good. He told me that I have a partial dry socket from the ventilator ([id number]). He says that he sees that a lot with people on ventilators, CPAP and Bipap. He told me that I don't have an infection, but he gave me another antibiotic for 7 days. He also gave me a syringe with something to allow it to heal up. He told me that half of the tooth is already sealed." She stated that she will stay off of the Life 2000 as much as she can to allow it to close and heal. Inquired if he recommended that we hold off on transplant. She stated, "He told me that he wouldn't make that decision." Patient inquired if she should cancel her appointment next week (10/26/23). Instructed patient to keep the appointment since it is a week away. Informed her that I would notify Dr. Arango of above and would let her know if there will be any changes to the treatment plan. Instructed her to contact us for any new or worsening symptoms. She verbalized her understanding of all discussed.       ----- Message ----- "   From: Syeda Arango DO   Sent: 10/20/2023   8:55 AM CDT   To: Rebecca Hernandez RN   Subject: RE: Cosign-Required Note                         No changes.  Agree she will need to keep all scheduled appointments.  Thanks

## 2023-10-26 ENCOUNTER — HOSPITAL ENCOUNTER (OUTPATIENT)
Dept: PULMONOLOGY | Facility: CLINIC | Age: 63
Discharge: HOME | End: 2023-10-26
Payer: MEDICARE

## 2023-10-26 ENCOUNTER — PATIENT MESSAGE (OUTPATIENT)
Dept: TRANSPLANT | Facility: CLINIC | Age: 63
End: 2023-10-26

## 2023-10-26 ENCOUNTER — OFFICE VISIT (OUTPATIENT)
Dept: TRANSPLANT | Facility: CLINIC | Age: 63
End: 2023-10-26
Payer: MEDICARE

## 2023-10-26 ENCOUNTER — OFFICE VISIT (OUTPATIENT)
Dept: CARDIOTHORACIC SURGERY | Facility: CLINIC | Age: 63
End: 2023-10-26
Payer: MEDICARE

## 2023-10-26 VITALS
WEIGHT: 145.5 LBS | DIASTOLIC BLOOD PRESSURE: 77 MMHG | HEART RATE: 88 BPM | RESPIRATION RATE: 20 BRPM | OXYGEN SATURATION: 96 % | BODY MASS INDEX: 25.78 KG/M2 | TEMPERATURE: 99 F | SYSTOLIC BLOOD PRESSURE: 141 MMHG | HEIGHT: 63 IN

## 2023-10-26 VITALS — BODY MASS INDEX: 25.78 KG/M2 | HEIGHT: 63 IN | WEIGHT: 145.5 LBS

## 2023-10-26 VITALS
BODY MASS INDEX: 25.74 KG/M2 | SYSTOLIC BLOOD PRESSURE: 160 MMHG | HEART RATE: 80 BPM | WEIGHT: 145.31 LBS | OXYGEN SATURATION: 98 %

## 2023-10-26 DIAGNOSIS — K21.9 GASTROESOPHAGEAL REFLUX DISEASE, UNSPECIFIED WHETHER ESOPHAGITIS PRESENT: ICD-10-CM

## 2023-10-26 DIAGNOSIS — Z76.82 LUNG TRANSPLANT CANDIDATE: Primary | ICD-10-CM

## 2023-10-26 DIAGNOSIS — J84.10 PULMONARY EMPHYSEMA WITH FIBROSIS OF LUNG: ICD-10-CM

## 2023-10-26 DIAGNOSIS — J84.10 PULMONARY EMPHYSEMA WITH FIBROSIS OF LUNG: Primary | ICD-10-CM

## 2023-10-26 DIAGNOSIS — J43.9 PULMONARY EMPHYSEMA WITH FIBROSIS OF LUNG: ICD-10-CM

## 2023-10-26 DIAGNOSIS — Z76.82 AWAITING TRANSPLANTATION OF LUNG: ICD-10-CM

## 2023-10-26 DIAGNOSIS — J43.9 PULMONARY EMPHYSEMA WITH FIBROSIS OF LUNG: Primary | ICD-10-CM

## 2023-10-26 DIAGNOSIS — J96.11 CHRONIC RESPIRATORY FAILURE WITH HYPOXIA: ICD-10-CM

## 2023-10-26 LAB
FEF 25 75 LLN: 1.1
FEF 25 75 PRE REF: 22.7 %
FEF 25 75 REF: 2.17
FEV05 LLN: 0.92
FEV05 REF: 1.78
FEV1 FVC LLN: 67
FEV1 FVC PRE REF: 67.7 %
FEV1 FVC REF: 79
FEV1 LLN: 1.79
FEV1 PRE REF: 46.3 %
FEV1 REF: 2.38
FVC LLN: 2.28
FVC PRE REF: 67.9 %
FVC REF: 3.02
PEF LLN: 4.43
PEF PRE REF: 43.6 %
PEF REF: 6.09
PHYSICIAN COMMENT: ABNORMAL
PRE FEF 25 75: 0.49 L/S (ref 1.1–3.62)
PRE FET 100: 6.18 SEC
PRE FEV05 REF: 44.1 %
PRE FEV1 FVC: 53.72 % (ref 67.15–89.73)
PRE FEV1: 1.1 L (ref 1.79–2.95)
PRE FEV5: 0.78 L (ref 0.92–2.64)
PRE FVC: 2.05 L (ref 2.28–3.79)
PRE PEF: 2.66 L/S (ref 4.43–7.74)

## 2023-10-26 PROCEDURE — 99213 OFFICE O/P EST LOW 20 MIN: CPT | Mod: S$GLB,TXP,, | Performed by: THORACIC SURGERY (CARDIOTHORACIC VASCULAR SURGERY)

## 2023-10-26 PROCEDURE — 94010 BREATHING CAPACITY TEST: CPT | Mod: S$GLB,TXP,, | Performed by: INTERNAL MEDICINE

## 2023-10-26 PROCEDURE — 3078F PR MOST RECENT DIASTOLIC BLOOD PRESSURE < 80 MM HG: ICD-10-PCS | Mod: CPTII,S$GLB,TXP, | Performed by: INTERNAL MEDICINE

## 2023-10-26 PROCEDURE — 94618 PULMONARY STRESS TESTING: ICD-10-PCS | Mod: S$GLB,TXP,, | Performed by: INTERNAL MEDICINE

## 2023-10-26 PROCEDURE — 3044F PR MOST RECENT HEMOGLOBIN A1C LEVEL <7.0%: ICD-10-PCS | Mod: CPTII,S$GLB,TXP, | Performed by: THORACIC SURGERY (CARDIOTHORACIC VASCULAR SURGERY)

## 2023-10-26 PROCEDURE — 99214 OFFICE O/P EST MOD 30 MIN: CPT | Mod: 25,S$GLB,TXP, | Performed by: INTERNAL MEDICINE

## 2023-10-26 PROCEDURE — 99213 PR OFFICE/OUTPT VISIT, EST, LEVL III, 20-29 MIN: ICD-10-PCS | Mod: S$GLB,TXP,, | Performed by: THORACIC SURGERY (CARDIOTHORACIC VASCULAR SURGERY)

## 2023-10-26 PROCEDURE — 94010 BREATHING CAPACITY TEST: ICD-10-PCS | Mod: S$GLB,TXP,, | Performed by: INTERNAL MEDICINE

## 2023-10-26 PROCEDURE — 94618 PULMONARY STRESS TESTING: CPT | Mod: S$GLB,TXP,, | Performed by: INTERNAL MEDICINE

## 2023-10-26 PROCEDURE — 99999 PR PBB SHADOW E&M-EST. PATIENT-LVL V: ICD-10-PCS | Mod: PBBFAC,TXP,, | Performed by: INTERNAL MEDICINE

## 2023-10-26 PROCEDURE — 99214 PR OFFICE/OUTPT VISIT, EST, LEVL IV, 30-39 MIN: ICD-10-PCS | Mod: 25,S$GLB,TXP, | Performed by: INTERNAL MEDICINE

## 2023-10-26 PROCEDURE — 99999 PR PBB SHADOW E&M-EST. PATIENT-LVL II: CPT | Mod: PBBFAC,TXP,, | Performed by: THORACIC SURGERY (CARDIOTHORACIC VASCULAR SURGERY)

## 2023-10-26 PROCEDURE — 3077F PR MOST RECENT SYSTOLIC BLOOD PRESSURE >= 140 MM HG: ICD-10-PCS | Mod: CPTII,S$GLB,TXP, | Performed by: INTERNAL MEDICINE

## 2023-10-26 PROCEDURE — 3008F BODY MASS INDEX DOCD: CPT | Mod: CPTII,S$GLB,TXP, | Performed by: THORACIC SURGERY (CARDIOTHORACIC VASCULAR SURGERY)

## 2023-10-26 PROCEDURE — 3008F BODY MASS INDEX DOCD: CPT | Mod: CPTII,S$GLB,TXP, | Performed by: INTERNAL MEDICINE

## 2023-10-26 PROCEDURE — 1159F PR MEDICATION LIST DOCUMENTED IN MEDICAL RECORD: ICD-10-PCS | Mod: CPTII,S$GLB,TXP, | Performed by: INTERNAL MEDICINE

## 2023-10-26 PROCEDURE — 99999 PR PBB SHADOW E&M-EST. PATIENT-LVL II: ICD-10-PCS | Mod: PBBFAC,TXP,, | Performed by: THORACIC SURGERY (CARDIOTHORACIC VASCULAR SURGERY)

## 2023-10-26 PROCEDURE — 3077F SYST BP >= 140 MM HG: CPT | Mod: CPTII,S$GLB,TXP, | Performed by: INTERNAL MEDICINE

## 2023-10-26 PROCEDURE — 3078F DIAST BP <80 MM HG: CPT | Mod: CPTII,S$GLB,TXP, | Performed by: INTERNAL MEDICINE

## 2023-10-26 PROCEDURE — 3008F PR BODY MASS INDEX (BMI) DOCUMENTED: ICD-10-PCS | Mod: CPTII,S$GLB,TXP, | Performed by: INTERNAL MEDICINE

## 2023-10-26 PROCEDURE — 3044F HG A1C LEVEL LT 7.0%: CPT | Mod: CPTII,S$GLB,TXP, | Performed by: THORACIC SURGERY (CARDIOTHORACIC VASCULAR SURGERY)

## 2023-10-26 PROCEDURE — 3044F HG A1C LEVEL LT 7.0%: CPT | Mod: CPTII,S$GLB,TXP, | Performed by: INTERNAL MEDICINE

## 2023-10-26 PROCEDURE — 3044F PR MOST RECENT HEMOGLOBIN A1C LEVEL <7.0%: ICD-10-PCS | Mod: CPTII,S$GLB,TXP, | Performed by: INTERNAL MEDICINE

## 2023-10-26 PROCEDURE — 1159F MED LIST DOCD IN RCRD: CPT | Mod: CPTII,S$GLB,TXP, | Performed by: INTERNAL MEDICINE

## 2023-10-26 PROCEDURE — 99999 PR PBB SHADOW E&M-EST. PATIENT-LVL V: CPT | Mod: PBBFAC,TXP,, | Performed by: INTERNAL MEDICINE

## 2023-10-26 PROCEDURE — 3008F PR BODY MASS INDEX (BMI) DOCUMENTED: ICD-10-PCS | Mod: CPTII,S$GLB,TXP, | Performed by: THORACIC SURGERY (CARDIOTHORACIC VASCULAR SURGERY)

## 2023-10-26 NOTE — PROGRESS NOTES
LUNG TRANSPLANT PRE FOLLOW-UP    Referring Physician: Denis Rai    Reason for Visit:  Pre-lung transplant follow-up.         Date of Initial Evaluation:                                                                                              History of Present Illness: Antonella Chase is a 63 y.o. female who is on  6-10 L of oxygen. She is on no assisted ventilation.  Her New York Heart Association Class is III and a Karnofsky score of 70% - Cares for self: Unable to carry on normal activity or active work. She is not diabetic.     Patient returns for follow up visit.  She is currently listed for lung transplant.  She states that her dyspnea at rest is stable, but has worsened with activity.  She does continue to remain active and is enrolled in pulmonary rehab.  She takes all inhalers and OFEV as directed.  Has GERD and esophageal spasms which she states is under control when she takes her Carafate as directed.  Overall, she remains stable.      Review of Systems   Constitutional:  Negative for chills, diaphoresis, fever, malaise/fatigue and weight loss.   HENT:  Negative for congestion, nosebleeds and sinus pain.    Eyes:  Negative for blurred vision.   Respiratory:  Positive for cough (chronic, dry) and shortness of breath (with exertion, worse than previous). Negative for hemoptysis, sputum production and wheezing.    Cardiovascular:  Negative for chest pain, palpitations, orthopnea, claudication and leg swelling.   Gastrointestinal:  Negative for abdominal pain, constipation, diarrhea, heartburn, nausea and vomiting.   Genitourinary:  Negative for dysuria, frequency and urgency.   Musculoskeletal:  Negative for myalgias.   Skin:  Negative for itching and rash.   Neurological:  Negative for dizziness, tremors and headaches.   Psychiatric/Behavioral:  Negative for substance abuse. The patient is not nervous/anxious.      Objective:   BP (!) 141/77 (BP Location: Left arm, Patient Position: Sitting, BP  "Method: Medium (Automatic))   Pulse 88   Temp 98.5 °F (36.9 °C) (Oral)   Ht 5' 3" (1.6 m)   Wt 66 kg (145 lb 8.1 oz)   SpO2 96% Comment: 6 liters  BMI 25.77 kg/m²   Physical Exam  Constitutional:       Appearance: She is well-developed.      Interventions: Nasal cannula in place.   HENT:      Head: Normocephalic and atraumatic.   Eyes:      Conjunctiva/sclera: Conjunctivae normal.   Cardiovascular:      Rate and Rhythm: Normal rate and regular rhythm.      Heart sounds: Normal heart sounds.   Pulmonary:      Effort: Pulmonary effort is normal.      Breath sounds: Decreased air movement present. Examination of the right-middle field reveals decreased breath sounds. Examination of the left-middle field reveals decreased breath sounds. Examination of the right-lower field reveals decreased breath sounds. Examination of the left-lower field reveals decreased breath sounds. Decreased breath sounds and rales (trace) present.   Abdominal:      General: Bowel sounds are normal.      Palpations: Abdomen is soft.   Musculoskeletal:         General: Normal range of motion.      Cervical back: Normal range of motion and neck supple.   Skin:     General: Skin is warm and dry.   Neurological:      Mental Status: She is alert and oriented to person, place, and time.       Labs:  Hospital Outpatient Visit on 10/26/2023   Component Date Value    Pre FVC 10/26/2023 2.05 (L)     PRE FEV5 10/26/2023 0.78 (L)     Pre FEV1 10/26/2023 1.10 (L)     Pre FEV1 FVC 10/26/2023 53.72 (L)     Pre FEF 25 75 10/26/2023 0.49 (L)     Pre PEF 10/26/2023 2.66 (L)     Pre  10/26/2023 6.18     FVC Ref 10/26/2023 3.02     FVC LLN 10/26/2023 2.28     FVC Pre Ref 10/26/2023 67.9     FEV05 REF 10/26/2023 1.78     FEV05 LLN 10/26/2023 0.92     PRE FEV05 REF 10/26/2023 44.1     FEV1 Ref 10/26/2023 2.38     FEV1 LLN 10/26/2023 1.79     FEV1 Pre Ref 10/26/2023 46.3     FEV1 FVC Ref 10/26/2023 79     FEV1 FVC LLN 10/26/2023 67     FEV1 FVC Pre Ref " 10/26/2023 67.7     FEF 25 75 Ref 10/26/2023 2.17     FEF 25 75 LLN 10/26/2023 1.10     FEF 25 75 Pre Ref 10/26/2023 22.7     PEF Ref 10/26/2023 6.09     PEF LLN 10/26/2023 4.43     PEF Pre Ref 10/26/2023 43.6            10/26/2023     2:28 PM 8/29/2023     9:37 AM 5/15/2023    11:28 AM 3/30/2023    12:24 PM 11/18/2021    10:00 AM 10/20/2020     9:00 AM 5/4/2020     9:16 AM   Pulmonary Function Tests   FVC 2.05 liters 2.03 liters 2.07 liters 2.26 liters 2.56 liters 2.31 liters 2.5 liters   FEV1 1.1 liters 1.06 liters 1.19 liters 1.26 liters 1.39 liters 1.17 liters 1.32 liters   TLC (liters)   5.16 liters 5.31 liters 4.52 liters 4.36 liters 5.41 liters   DLCO (ml/mmHg sec)   5.54 ml/mmHg sec 6.36 ml/mmHg sec 8.9 ml/mmHg sec 11.7 ml/mmHg sec 9.8 ml/mmHg sec   FVC% 67.9 67 68.4 74.4 83 74 76   FEV1% 46.3 44 49.6 52.6 57 47 51   FEF 25-75 0.49 0.42 0.57 0.59 0.61     FEF 25-75% 22.7 19 25.9 27.1 27     TLC%   108.2 111.3 95 91 106   RV   3.04 3.05 1.74 2.03 2.85   RV%   163.8 164.4 95 111 147   DLCO%   25 28.7 40 52 40         10/26/2023     1:32 PM 8/29/2023     7:59 AM 5/15/2023     9:02 AM 3/30/2023    12:02 PM 12/29/2022    10:56 AM 11/18/2021     9:10 AM 2/4/2021     2:39 PM   6MW   6MWT Status completed without stopping completed without stopping completed without stopping completed without stopping completed without stopping completed without stopping completed without stopping   Patient Reported Dyspnea;Chest pain;Leg pain Dyspnea Chest pain;Dizziness;Dyspnea;Lightheadedness;Leg pain Dyspnea Chest pain;Dyspnea;Leg pain;Lightheadedness Chest pain;Dyspnea Dyspnea;Leg pain;Lightheadedness   Was O2 used? Yes Yes Yes Yes Yes No No   Delivery Method Cannula Cannula Cannula;Pull Tank;Continuous Flow Cannula;Pull Tank;Continuous Flow Cannula;Pull Tank;Continuous Flow     6MW Distance walked (feet) 1250 feet 1380 feet 1300 feet 1200 feet 1300 feet 1500 feet 1300 feet   Distance walked (meters) 381 meters 420.62 meters  396.24 meters 365.76 meters 396.24 meters 457.2 meters 396.24 meters   Did patient stop? No No No No No No No   Oxygen Saturation 98 % 99 % 96 % 98 % 97 % 96 % 94 %   Supplemental Oxygen 6 L/M 6 L/M 2 L/M 2 L/M 2 L/M Room Air Room Air   Heart Rate 84 bpm 60 bpm 81 bpm 94 bpm 80 bpm 70 bpm 90 bpm   Blood Pressure 142/72 159/74 136/65 143/61 160/67 111/56 133/69   Stephanie Dyspnea Rating  very light very, very light (just noticeable) very light nothing at all nothing at all light very light   Oxygen Saturation 89 % 89 % 92 % 90 % 91 % 88 % 89 %   Supplemental Oxygen  6 L/M 6 L/M 4 L/M 2 L/M Room Air Room Air   Heart Rate 93 bpm 92 bpm 95 bpm 100 bpm 81 bpm 91 bpm 105 bpm   Blood Pressure 139/66 176/77 153/67 152/73 135/61 140/65 143/70   Stephanie Dyspnea Rating  somewhat heavy somewhat heavy heavy somewhat heavy heavy heavy moderate   Recovery Time (seconds) 85 seconds 63 seconds 55 seconds 66 seconds 128 seconds 162 seconds 91 seconds   Oxygen Saturation 99 % 97 % 95 % 98 % 98 % 97 % 94 %   Supplemental Oxygen  6 L/M 6 L/M 4 L/M 2 L/M Room Air Room Air   Heart Rate 89 bpm 71 bpm  89 bpm 84 bpm 73 bpm        Cardiodiagnostics:  Results for orders placed during the hospital encounter of 05/15/23    Echo Saline Bubble? No    Interpretation Summary  · The left ventricle is normal in size with normal systolic function.  · The estimated ejection fraction is 65%.  · Normal left ventricular diastolic function.  · Normal right ventricular size with normal right ventricular systolic function.  · Normal central venous pressure (3 mmHg).      Assessment:-  1. Pulmonary emphysema with fibrosis of lung    2. Chronic respiratory failure with hypoxia    3. Gastroesophageal reflux disease, unspecified whether esophagitis present    4. Awaiting transplantation of lung      Plan:   Patient followed for combined COPD/pulmonary fibrosis.  On Breztri, prn Albuterol, and OFEV.  Tolerating therapy.  No recent exacerbations.  Spirometry stable.     Continue with O2 as prescribed.  Needs 10L with exertion.  RHC reviewed and she does not have significant PH.  She remains on [id number] mobile NIV is tolerating it well.     Patient requires [id number] mobile NIV due to chronic respiratory failure and chronic COPD with chronic hypoxemic respiratory failure.   [id number] provides mobile volume ventilation since supplemental Oxygen alone does not provide enough support.   BiPAP has been considered but ruled out because it is a stationery device that could limit the patient's functional capacity.   Modalities tried include supplemental Oxygen, medications, and inhalation therapies but the patient still requires mobile NIV with a [id number] device.   Failure to provide NIV could lead to an exacerbation of the patient's condition, possible hospital admission, and potential harm to the patient.     Continue with PPI and Carafate as prescribed.  Is scheduled to undergo EGD and BRAVO/Manometry in [facility].   She continues to meet indications for LUT and will remain on the lung transplant list.  She is to remain active in pulmonary rehab.  Current concerns include her high PRA and esophageal symptoms.      Follow-up in 2 months.    Ricky Goldman NP  Lung Transplant

## 2023-10-26 NOTE — PROGRESS NOTES
Subjective:      Patient ID: Antonella Chase is a 63 y.o. female.    Chief Complaint: No chief complaint on file.      HPI:  Antonella Chase is a 63 y.o. female who presents to lung transplant candidacy follow up. She has IPF and COPD. Medical conditions include  CAD. On 6 L of oxygen. She is on no assisted ventilation.  Her New York Heart Association Class is III and a Karnofsky score of 70% - Cares for self: Unable to carry on normal activity or active work. She is not diabetic. She has had bilateral thoracotomies early in life for refractory pneumothoraces  On the left and a large bulla on the right. A pleurodesis was not done on either side.  Her functional status remains good .  last seen in June 2023, She appears to be a good candidate for transplantation, although her recent bacteremia from diverticulitis is concerning in light of anticipated immunosuppression. [facility] 6/2023 showed normal coronaries.  She reports that her dyspnea is stable. Finding herself using the [id number] daily.  She does get short of breath with heavy exertion and climbing stairs although she remains very active and is enrolled in pulmonary rehab.      9/26/23, approved for bilateral lung, right single lung, or left single lung transplant.     cPRA (78%)  A+    Current medications Reviewed  Current Outpatient Medications on File Prior to Visit   Medication Sig Dispense Refill    albuterol (ACCUNEB) 1.25 mg/3 mL Nebu Take 3 mLs by nebulization every 6 (six) hours as needed.      albuterol (PROVENTIL) 2.5 mg /3 mL (0.083 %) nebulizer solution Take 2.5 mg by nebulization every 4 (four) hours as needed.      albuterol (PROVENTIL/VENTOLIN HFA) 90 mcg/actuation inhaler Inhale 2 puffs into the lungs every 4 (four) hours as needed.      aspirin (ECOTRIN) 81 MG EC tablet Take 81 mg by mouth Daily.      BREZTRI AEROSPHERE 160-9-4.8 mcg/actuation HFAA Inhale 2 puffs into the lungs 2 (two) times a day.      busPIRone (BUSPAR) 7.5 MG tablet Take 7.5 mg by  mouth 2 (two) times daily.      cephALEXin (KEFLEX) 500 MG capsule Take 500 mg by mouth 3 (three) times daily.      famotidine (PEPCID) 40 MG tablet Take 40 mg by mouth every evening.      hydrOXYzine HCL (ATARAX) 25 MG tablet Take 25 mg by mouth nightly as needed. For hives      hyoscyamine (LEVSIN/SL) 0.125 mg Subl Place 0.125-0.25 mg under the tongue every 4 (four) hours as needed.      ibuprofen (ADVIL,MOTRIN) 600 MG tablet Take 600 mg by mouth 4 (four) times daily. Prn  / sparingly due to diverticulitis      ipratropium (ATROVENT) 0.02 % nebulizer solution Take by nebulization every 4 (four) hours as needed.      LINZESS 72 mcg Cap capsule Take 72 mcg by mouth daily as needed.      naloxone (NARCAN) 4 mg/actuation Spry USE 1 SPRAY in nose ONCE. MAY REPEAT every 2 TO 3 minutes UNTIL patient responds      NEXLIZET 180-10 mg Tab Take 1 tablet by mouth once daily.      nystatin (MYCOSTATIN) powder TO THE AFFECTED AREA(S) TWICE DAILY      nystatin-triamcinolone (MYCOLOG) ointment TO THE AFFECTED AREA(S) TWICE DAILY      OFEV 150 mg Cap Take 150 mg by mouth 2 (two) times a day.      pantoprazole (PROTONIX) 40 MG tablet Take 40 mg by mouth 2 (two) times daily.      predniSONE (DELTASONE) 20 MG tablet Take 2 tablets by mouth once daily.      sucralfate (CARAFATE) 1 gram tablet Take 1 g by mouth 4 (four) times daily.      traMADoL (ULTRAM) 50 mg tablet Take 50 mg by mouth every 12 (twelve) hours as needed.      tretinoin (RETIN-A) 0.05 % cream apply a SMALL AMOUNT TO acne prone face nightly      varicella-zoster [id number], PF, (SHINGRIX) 50 mcg/0.5 mL injection Inject into the muscle. (Patient not taking: Reported on 8/29/2023) 1 each 1     Current Facility-Administered Medications on File Prior to Visit   Medication Dose Route Frequency Provider Last Rate Last Admin    sodium chloride 0.9% flush 10 mL  10 mL Intravenous PRN Polo Blackburn MD           Review of Systems   Constitutional:  Negative for activity  change, appetite change, fatigue and fever.   HENT:  Negative for nosebleeds.    Respiratory:  Positive for shortness of breath. Negative for cough.    Cardiovascular:  Negative for chest pain, palpitations and leg swelling.   Gastrointestinal:  Negative for abdominal distention, abdominal pain and nausea.   Genitourinary:  Negative for frequency.   Musculoskeletal:  Negative for arthralgias and myalgias.   Skin:  Negative for rash.   Neurological:  Negative for dizziness and numbness.   Hematological:  Does not bruise/bleed easily.     Objective:   Physical Exam  Constitutional:       Appearance: Normal appearance.   HENT:      Head: Normocephalic and atraumatic.      Nose: Nose normal.      Mouth/Throat:      Mouth: Mucous membranes are moist.   Eyes:      Extraocular Movements: Extraocular movements intact.   Cardiovascular:      Rate and Rhythm: Normal rate.      Heart sounds: Normal heart sounds.   Pulmonary:      Effort: Pulmonary effort is normal. No respiratory distress.      Breath sounds: Rales present.   Abdominal:      General: Abdomen is flat.      Palpations: Abdomen is soft.   Musculoskeletal:         General: Normal range of motion.      Cervical back: Normal range of motion.   Skin:     General: Skin is warm and dry.      Capillary Refill: Capillary refill takes less than 2 seconds.   Neurological:      General: No focal deficit present.      Mental Status: She is alert.         Diagnotic Results: reviewed        8/29/2023     9:37 AM 5/15/2023    11:28 AM 3/30/2023    12:24 PM 11/18/2021    10:00 AM 10/20/2020     9:00 AM 5/4/2020     9:16 AM   Pulmonary Function Tests   FVC 2.03 liters 2.07 liters 2.26 liters 2.56 liters 2.31 liters 2.5 liters   FEV1 1.06 liters 1.19 liters 1.26 liters 1.39 liters 1.17 liters 1.32 liters   TLC (liters)   5.16 liters 5.31 liters 4.52 liters 4.36 liters 5.41 liters   DLCO (ml/mmHg sec)   5.54 ml/mmHg sec 6.36 ml/mmHg sec 8.9 ml/mmHg sec 11.7 ml/mmHg sec 9.8 ml/mmHg  sec   FVC% 67 68.4 74.4 83 74 76   FEV1% 44 49.6 52.6 57 47 51   FEF 25-75 0.42 0.57 0.59 0.61       FEF 25-75% 19 25.9 27.1 27       TLC%   108.2 111.3 95 91 106   RV   3.04 3.05 1.74 2.03 2.85   RV%   163.8 164.4 95 111 147   DLCO%   25 28.7 40 52 40           8/29/2023     7:59 AM 5/15/2023     9:02 AM 3/30/2023    12:02 PM 12/29/2022    10:56 AM 11/18/2021     9:10 AM 2/4/2021     2:39 PM   6MW   6MWT Status completed without stopping completed without stopping completed without stopping completed without stopping completed without stopping completed without stopping   Patient Reported Dyspnea Chest pain;Dizziness;Dyspnea;Lightheadedness;Leg pain Dyspnea Chest pain;Dyspnea;Leg pain;Lightheadedness Chest pain;Dyspnea Dyspnea;Leg pain;Lightheadedness   Was O2 used? Yes Yes Yes Yes No No   Delivery Method Cannula Cannula;Pull Tank;Continuous Flow Cannula;Pull Tank;Continuous Flow Cannula;Pull Tank;Continuous Flow       6MW Distance walked (feet) 1380 feet 1300 feet 1200 feet 1300 feet 1500 feet 1300 feet   Distance walked (meters) 420.62 meters 396.24 meters 365.76 meters 396.24 meters 457.2 meters 396.24 meters   Did patient stop? No No No No No No   Oxygen Saturation 99 % 96 % 98 % 97 % 96 % 94 %   Supplemental Oxygen 6 L/M 2 L/M 2 L/M 2 L/M Room Air Room Air   Heart Rate 60 bpm 81 bpm 94 bpm 80 bpm 70 bpm 90 bpm   Blood Pressure 159/74 136/65 143/61 160/67 111/56 133/69   Stephanie Dyspnea Rating  very, very light (just noticeable) very light nothing at all nothing at all light very light   Oxygen Saturation 89 % 92 % 90 % 91 % 88 % 89 %   Supplemental Oxygen 6 L/M 6 L/M 4 L/M 2 L/M Room Air Room Air   Heart Rate 92 bpm 95 bpm 100 bpm 81 bpm 91 bpm 105 bpm   Blood Pressure 176/77 153/67 152/73 135/61 140/65 143/70   Stephanie Dyspnea Rating  somewhat heavy heavy somewhat heavy heavy heavy moderate   Recovery Time (seconds) 63 seconds 55 seconds 66 seconds 128 seconds 162 seconds 91 seconds   Oxygen Saturation 97 % 95 % 98  % 98 % 97 % 94 %   Supplemental Oxygen 6 L/M 6 L/M 4 L/M 2 L/M Room Air Room Air   Heart Rate 71 bpm   89 bpm 84 bpm 73 bpm           Carotid 6/13/23  No evidence of a hemodynamically significant carotid bifurcation stenosis.   1-39% stenosis at the carotid artery bifurcation bilaterally.    [facility] 6/2023    The pre-procedure left ventricular end diastolic pressure was 10.    The estimated blood loss was <50 mL.    The coronary arteries were normal..    The filling pressures on the right and left were normal.'    ECHO:  The left ventricle is normal in size with normal systolic function.  The estimated ejection fraction is 65%.  Normal left ventricular diastolic function.  Normal right ventricular size with normal right ventricular systolic function.  Normal central venous pressure (3 mmHg).     CT Chest:  Impression:  No significant interval change since prior CT of the chest 02/09/2023.  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     Nuc Med Split:  Left lung  Left upper lung zone: 5.8 %  Left mid lung zone: 25.5 %  Left lower lung zone: 15.3 %  Left lung total: 46.6 %     Right lung  Right upper lung zone: 5.1 %  Right mid lung zone: 28.9 %  Right lower lung zone: 19.2 %  Right lung total: 53.4 %     Blood group: A+  Assessment:   Combine IPF/COPD  Lung transplant candidate   Plan:     CTS Attending Note:    I have personally taken the history and examined this patient and agree with the ESCOBAR's note as stated above.  Very pleasant 63-year-old woman with end-stage lung disease on the lung transplant list.  She requires additional support, but is able to maintain a surprisingly active lifestyle.  We discussed her PRA results.  She remains a good candidate for lung transplantation.

## 2023-10-26 NOTE — PROCEDURES
Antonella Chase is a 63 y.o.  female patient, who presents for a 6 minute walk test ordered by DO Conchita.  The diagnosis is Interstitial Lung Disease.  The patient's BMI is 25.8 kg/m2.  Predicted distance (lower limit of normal) is 361.38 meters.      Test Results:    The test was completed without stopping. The total time walked was 360 seconds. During walking, the patient reported:  Dyspnea, Chest pain, Leg pain. The patient used supplemental oxygen during testing.     10/26/2023---------Distance: 381 meters (1250 feet)     Lap Walk Time O2 Sat % Supplemental Oxygen Heart Rate Blood Pressure Stephanie Scale Comment   Pre-exercise  (Resting) 0 0 98 % 6 L/M 84 bpm 142/72 mmHg 1    During Exercise 1 58 sec 98 % 6 L/M 88 bpm      During Exercise 2 111 sec 91 % 6 L/M 90 bpm      During Exercise 3 172 sec 87 % 6 L/M 97 bpm   Oxygen increased   During Exercise 4 227 sec 91 % 8 L/M 99 bpm      During Exercise 5 284 sec 91 % 8 L/M 92 bpm      During Exercise 6 340 sec 88 % 8 L/M 93 bpm   Oxygen increased   End of Exercise  360 sec 89 % 10 L/M 93 bpm 139/66 mmHg 4    Post-exercise  (Recovery)   99 % 10 L/M  89 bpm        Recovery Time: 85 seconds    Performing nurse/tech: Omar BERNAL      PREVIOUS STUDY:   08/29/2023---------Distance: 420.62 meters (1380 feet)       Lap Walk Time O2 Sat % Supplemental Oxygen Heart Rate Blood Pressure Stephanie Scale Comment   Pre-exercise  (Resting) 0 0 99 % 6 L/M 60 bpm 159/74 mmHg 0.5     During Exercise 1 55 sec 99 % 6 L/M 77 bpm         During Exercise 2 103 sec 95 % 6 L/M 83 bpm         During Exercise 3 156 sec 92 % 6 L/M 85 bpm         During Exercise 4 208 sec 92 % 6 L/M 89 bpm         During Exercise 5 263 sec 90 % 6 L/M 94 bpm         During Exercise 6 314 sec 90 % 6 L/M 94 bpm         End of Exercise   360 sec 89 % 6 L/M 92 bpm 176/77 mmHg 4     Post-exercise  (Recovery)     97 % 6 L/M  71 bpm             CLINICAL INTERPRETATION:  Six minute walk distance is 381 meters (1250 feet)  with somewhat heavy dyspnea.  During exercise, there was significant desaturation while breathing supplemental oxygen.  Both blood pressure and heart rate remained stable with walking.  The patient reported non-pulmonary symptoms during exercise.  Since the previous study in August 2023, exercise capacity is unchanged.  Based upon age and body mass index, exercise capacity is normal.

## 2023-10-26 NOTE — LETTER
October 26, 2023        Denis Rai  [address] [facility] [place] [address]  Phone: [phone]  Fax: [phone]             Sunil Kam - Transplant [facility]  [address]  [facility] [address]  Phone: [phone]   Patient: Antonella Chase   MR Number: [id number]   YOB: 1960   Date of Visit: 10/26/2023       Dear Dr. Denis Rai    Thank you for referring Antonella Chase to me for evaluation. Attached you will find relevant portions of my assessment and plan of care.    If you have questions, please do not hesitate to call me. I look forward to following Antonella Chase along with you.    Sincerely,    Syeda Arango, DO    Enclosure    If you would like to receive this communication electronically, please contact [email or web address] or [phone] to request eSnips Link access.    eSnips Link is a tool which provides read-only access to select patient information with whom you have a relationship. Its easy to use and provides real time access to review your patients record including encounter summaries, notes, results, and demographic information.    If you feel you have received this communication in error or would no longer like to receive these types of communications, please e-mail [email or web address]

## 2023-11-01 ENCOUNTER — TELEPHONE (OUTPATIENT)
Dept: TRANSPLANT | Facility: CLINIC | Age: 63
End: 2023-11-01
Payer: MEDICARE

## 2023-11-01 NOTE — TELEPHONE ENCOUNTER
----- Message from Asia Osborn sent at 11/1/2023  9:13 AM CDT -----  Regarding: Orders & Questions    Name Of Caller:   Antonella    Contact Preference:   [phone]     Nature of call:   Pt requesting an order to extend her lung rehab. She's currently waiting at the rehab center in [facility]. Pt also has questions about PRA.    Contacted patient. She stated that she is at pulmonary rehab and they need an order for her to continue with the program. Patient gave her phone to Christin. Christin stated that they do not need an order for the Phase 4 monitoring program. They only need a signature. She stated that the form was sent to patient's cardiologist for his signature. Informed Christin that we are available if necessary. She verbalized her understanding and gave the phone back to patient. Patient inquired about her increased PRA result. Informed her that it was discussed during our selection committee meeting yesterday. Informed her that Dr. Arango is planning on contacting her regarding the results. She verbalized her understanding and will await Dr. Arango's call.

## 2023-11-02 ENCOUNTER — DOCUMENTATION ONLY (OUTPATIENT)
Dept: TRANSFUSION MEDICINE | Facility: HOSPITAL | Age: 63
End: 2023-11-02
Payer: MEDICARE

## 2023-11-02 ENCOUNTER — PATIENT MESSAGE (OUTPATIENT)
Dept: TRANSPLANT | Facility: CLINIC | Age: 63
End: 2023-11-02
Payer: MEDICARE

## 2023-11-02 NOTE — PROGRESS NOTES
[facility] TRANSFUSION MEDICINE  Section of Transfusion Medicine and Histocompatibility  HLA Note    Case Details   Diagnosis:  No primary diagnosis found.  Blood Type: A POS  HLA Type:   Class I:  Lab Results   Component Value Date    HBKG8EI 3 06/13/2023    YBPU2UI 11 06/13/2023    WZVQ4JR 8 06/13/2023    SUJQ1VA 35 06/13/2023    DQETS8WR 6 06/13/2023    BDVPS5XA XX 06/13/2023    QNECI3JD 4 06/13/2023    XZNVJ1DW 7 06/13/2023     Class II:  Lab Results   Component Value Date    IFEWIQ62WM 17 06/13/2023    WVLDLP19DC 4 06/13/2023    LBRPVJ005KB 52 06/13/2023    NOHLTY3083 53 06/13/2023    RRGVP5PX 2 06/13/2023    IXLTO5XA 7 06/13/2023     Recent Antibody Screen/ID Results:   Lab Results   Component Value Date    RC0REOM  10/26/2023     B7,B81,B67,B42,B55,B56,B27,B82,B73,B60,B54,B61,B48,B47    CIABCLM  10/26/2023     B7(61709), B81(9840), B67(8369), B42(8360), B55(7210), B56(6548), B27(6063), B82(4891), B73(4657), B60(4357), B54(4317), B61(3651), B48(3634), A66(2443), B13(2078), B47(2058), -- WEAK B57(1522), B63(1232), A25(1010)    CIIAB DQ6,DQ5,DQ4,DR51,,DR15 10/26/2023    ABCMT  10/26/2023     DQ6(49672), DQ5(63549), DQ4(6072), DR51(3706), (3228), DR15(2107), -- WEAK DR8(1817), DR9(1724), DR16(1698), DR1(1683), DR13(1355), DR12(1127), DRB1*04:02(1028), DR11(1025)     Auto T Cell Crossmatch Results:  Lab Results   Component Value Date    XMTCELLRES Negative 06/13/2023     Auto B Cell Crossmatch Results:  Lab Results   Component Value Date    BCELLRES Negative 06/13/2023     Assessment     Interpretation: The patient is previously known to be immunized to HLA with several antibody specificities above 5000 MFI.  The current sample has a similar antibody reaction pattern however there is significant increase in antibody strength that elucidated probable cross reactive epitopes.  Review of the patient's chart notes indicates that she had a recent episode of bacteremia associated with diverticulitis and also a  recent tooth extraction.  These could explain the non-specific generalized upregulation of HLA antibody strength. Some of these antibodies are still in the weak category, but MFI could underestimate the strength of antibodies due to broad epitope sharing of several of the antigens in the panel.  Although generally we use a cut-off of 5000 MFI for strongly recommended unacceptable antigens, there are several antigens that are clustered close to the 5000 MFI cutoff that also share epitopes with specificities above 5000 MFI.  These will also be listed in the strongly recommended unacceptable antigens.  The weak antibodies against antigens with shared epitopes have been added to the optional unacceptable antigens.  The adjusted cPRA for the strongly recommended unacceptable antigens is 84% and for the combined strongly and optional unacceptable antigens is 93%    Strongly Recommended Unacceptable Antigens: B7, B81, B67, B42, B55, B56, B27, B82, B73, B60, B54, B61, B48, A*66:02, B13, B47, B57, B63, A25, B58, DQ6, DQ5, DQ4    Optional Unacceptable Antigens: B76, A80, A32, DR51, , DR15, DR8, DR9, DR16, DR1, DR13, DR12, DRB1*04:02, DR11, DR10    Crossmatch Expectations: (Given strongly recommended unacceptable antigens) A retrospective or prospective flow cytometric crossmatch is expected to be negative but could be positive if the donor has antibodies to the listed optional unacceptable antigens.  The virtual crossmatch will be positive for these antigens if present in a potential donor if these are not listed as unacceptable antigens.  Due to the high degree of HLA sensitization, a prospective crossmatch is recommended if possible.    Please call the HLA Lab [phone] with any concerns or questions.    Ad Thibodeaux MD   Section of Transfusion Medicine & Histocompatibility  Department of Pathology and Laboratory Medicine  [facility]  11/02/2023

## 2023-11-02 NOTE — TELEPHONE ENCOUNTER
Contacted patient. She stated that she spoke with Dr. Arango today regarding her elevated cPRA and possible options. She inquired as to the referral process for lung transplant to [facility]. Informed patient that we would send the referral. Once it is received, then they will verify her insurance. If her insurance is accepted at their facility, then she will be contacted to schedule an appointment. She inquired if she would need to repeat all of the evaluation testing. Informed her that it would be up to that facility. Informed her that we have referred patients before to [facility] and they accepted the test results. I informed her that I don't know if it is still the same and I don't know if the testing has to be within a certain time frame or not. Also informed her that she would need to have some testing repeated to have on hand at their facility and she would need to meet with their providers. She inquired if she would need to relocate. Informed her that more than likely she would need to relocate, but she would need to discuss that with the facility she is referred to. Informed patient that Terrence informed us yesterday that her insurance will not allow her to be listed at two centers. Informed her that I don't know if they would allow a referral to another center while she is listed here. Informed her that I would have Terrence check with her  and I would let her know the response. She verbalized her understanding of all discussed. She stated that she will speak with her family and she will contact me next week with her plan.

## 2023-11-03 NOTE — PROGRESS NOTES
Received the below instructions from Dr. Arango. UNET updated with unacceptable antigens. JAGJIT [id number].    Message  Received: Today  Syeda Arango, DO  Rebecca Hernandez, RN  Yes please.  List recommended antigens and change to prospective.  Hopefully she will agree to go to a center that can desensitize her :(           Previous Messages       ----- Message -----   From: Rebecca Hernandez RN   Sent: 11/2/2023   5:21 PM CDT   To: Syeda Aragno, DO     Please see the attached note from Dr. Thibodeaux regarding patient's recent cPRA results. Do you want me to list the recommended antigens? Should we change her to a prospective crossmatch per his recommendations?

## 2023-11-07 ENCOUNTER — PATIENT MESSAGE (OUTPATIENT)
Dept: TRANSPLANT | Facility: CLINIC | Age: 63
End: 2023-11-07
Payer: MEDICARE

## 2023-11-07 ENCOUNTER — DOCUMENTATION ONLY (OUTPATIENT)
Dept: TRANSPLANT | Facility: CLINIC | Age: 63
End: 2023-11-07
Payer: MEDICARE

## 2023-11-07 DIAGNOSIS — J43.9 PULMONARY EMPHYSEMA WITH FIBROSIS OF LUNG: Primary | ICD-10-CM

## 2023-11-07 DIAGNOSIS — J96.11 CHRONIC RESPIRATORY FAILURE WITH HYPOXIA: ICD-10-CM

## 2023-11-07 DIAGNOSIS — Z76.82 AWAITING TRANSPLANTATION OF LUNG: ICD-10-CM

## 2023-11-07 DIAGNOSIS — J84.10 PULMONARY EMPHYSEMA WITH FIBROSIS OF LUNG: Primary | ICD-10-CM

## 2023-11-07 DIAGNOSIS — R73.09 ELEVATED GLYCOSYLATED HEMOGLOBIN: ICD-10-CM

## 2023-11-07 NOTE — PROGRESS NOTES
Referral for lung transplant consideration faxed to [facility] per patient's request.

## 2023-11-08 ENCOUNTER — TELEPHONE (OUTPATIENT)
Dept: TRANSPLANT | Facility: CLINIC | Age: 63
End: 2023-11-08
Payer: MEDICARE

## 2023-11-13 ENCOUNTER — PATIENT MESSAGE (OUTPATIENT)
Dept: TRANSPLANT | Facility: CLINIC | Age: 63
End: 2023-11-13
Payer: MEDICARE

## 2023-11-29 ENCOUNTER — PATIENT MESSAGE (OUTPATIENT)
Dept: TRANSPLANT | Facility: CLINIC | Age: 63
End: 2023-11-29
Payer: MEDICARE

## 2023-12-01 ENCOUNTER — PATIENT MESSAGE (OUTPATIENT)
Dept: TRANSPLANT | Facility: CLINIC | Age: 63
End: 2023-12-01
Payer: MEDICARE

## 2023-12-06 ENCOUNTER — PATIENT MESSAGE (OUTPATIENT)
Dept: TRANSPLANT | Facility: CLINIC | Age: 63
End: 2023-12-06
Payer: MEDICARE

## 2023-12-06 ENCOUNTER — TELEPHONE (OUTPATIENT)
Dept: TRANSPLANT | Facility: CLINIC | Age: 63
End: 2023-12-06
Payer: MEDICARE

## 2023-12-06 NOTE — TELEPHONE ENCOUNTER
"----- Message from Syeda Castellano sent at 12/6/2023 12:14 PM CST -----  Regarding: Patient advice  Contact: Pt  [phone]      Name of Caller:  Antonella     Contact Preference: [phone]    Nature of Call:  requesting a call back would like to discuss new medication given today for sinus infections would like to know if  it will effect lab results     Contacted patient. She stated that she had a massage yesterday. When her face was massaged, "a lot of stuff was released" which she had to spit out. She stated that this morning she woke up with a headache. She went to the doctor. She was told that she has a sinus infection and fluid behind her ear drum. She was prescribed doxycycline for 14 days and Flonase. Patient inquired if the antibiotic would affect her next "antibody" level. Informed patient the doxycycline will not affect the lab results. Informed her that vaccinations do affect the cPRA, therefore, we will test 15 days after a vaccination or other desensitizing event. She verbalized her understanding. She denied having any other questions or concerns. Instructed patient to contact us for any new or worsening symptoms. She again verbalized her understanding.  "

## 2023-12-11 ENCOUNTER — PATIENT MESSAGE (OUTPATIENT)
Dept: TRANSPLANT | Facility: CLINIC | Age: 63
End: 2023-12-11
Payer: MEDICARE

## 2023-12-15 ENCOUNTER — TELEPHONE (OUTPATIENT)
Dept: TRANSPLANT | Facility: CLINIC | Age: 63
End: 2023-12-15
Payer: MEDICARE

## 2023-12-15 NOTE — TELEPHONE ENCOUNTER
Reports she had a sinus infection, took doxycycline and flonase. Covid negative, augmentin and 7 days of prednisone per Dr. Snachez since Tuesday. Has 14 days of augmentin and prednisone left. She reports Dr. Arango was previously notified of this. (Reviewed in notes, yes). Patient requesting to reschedule all appointments from Monday, 12/18 until after January 1 (due to provider availability). Notified Dr. Arango of this.   ----- Message from Danny Cantrell sent at 12/15/2023 12:51 PM CST -----  Regarding: call back  Pt call to speak with Rebecca in regards to her upcoming appt  requesting call back    Call

## 2023-12-22 ENCOUNTER — TELEPHONE (OUTPATIENT)
Dept: TRANSPLANT | Facility: CLINIC | Age: 63
End: 2023-12-22
Payer: MEDICARE

## 2023-12-23 NOTE — TELEPHONE ENCOUNTER
Attempted to contact patient to notify her of an upcoming change with our program due to Dr. Nguyen's impending departure. No answer. Left a message informing patient that I will attempt to call her back.     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 JAGJIT 25.9433.     Attempted to contact patient to notify her of an upcoming change with our program. No answer. Left a message requesting a return call.

## 2023-12-26 ENCOUNTER — TELEPHONE (OUTPATIENT)
Dept: TRANSPLANT | Facility: CLINIC | Age: 63
End: 2023-12-26
Payer: MEDICARE

## 2023-12-26 NOTE — TELEPHONE ENCOUNTER
Spoke with patient, she verbalized understanding of upcoming change with our program due to Dr. Nguyen's impending departure.   She verbalized understanding that she will receive advanced notice updates of any status changes with our program. Patient reports she has a January 10 follow up appointment at [facility] but is pending scheduling at [facility] in [place]. All questions answered at this time.  ----- Message from Danny Cantrell sent at 12/26/2023  8:36 AM CST -----  Regarding: miss call  Pt returning miss call    Call

## 2023-12-28 ENCOUNTER — TELEPHONE (OUTPATIENT)
Dept: TRANSPLANT | Facility: CLINIC | Age: 63
End: 2023-12-28
Payer: MEDICARE

## 2023-12-28 NOTE — TELEPHONE ENCOUNTER
Patient calling to state she spoke with Kimberli at [facility] and has appointments on January 17th. They are requesting a bilateral venous doppler, she notes she has the order and will get it scheduled locally. Patient states she will keep follow up appointment with us on January 10.   ----- Message from Danny Cantrell sent at 12/28/2023  9:56 AM CST -----  Regarding: call back  Pt call to speak with Rebecca in regards to some question she has    Call

## 2024-01-09 ENCOUNTER — TELEPHONE (OUTPATIENT)
Dept: TRANSPLANT | Facility: CLINIC | Age: 64
End: 2024-01-09
Payer: MEDICARE

## 2024-01-10 ENCOUNTER — HOSPITAL ENCOUNTER (OUTPATIENT)
Dept: PULMONOLOGY | Facility: CLINIC | Age: 64
Discharge: HOME | End: 2024-01-10
Attending: INTERNAL MEDICINE
Payer: MEDICARE

## 2024-01-10 ENCOUNTER — HOSPITAL ENCOUNTER (OUTPATIENT)
Dept: RADIOLOGY | Facility: HOSPITAL | Age: 64
Discharge: HOME | End: 2024-01-10
Attending: INTERNAL MEDICINE
Payer: MEDICARE

## 2024-01-10 ENCOUNTER — OFFICE VISIT (OUTPATIENT)
Dept: TRANSPLANT | Facility: CLINIC | Age: 64
End: 2024-01-10
Attending: INTERNAL MEDICINE
Payer: MEDICARE

## 2024-01-10 ENCOUNTER — HOSPITAL ENCOUNTER (OUTPATIENT)
Dept: CARDIOLOGY | Facility: HOSPITAL | Age: 64
Discharge: HOME | End: 2024-01-10
Attending: INTERNAL MEDICINE
Payer: MEDICARE

## 2024-01-10 VITALS
BODY MASS INDEX: 25.7 KG/M2 | BODY MASS INDEX: 25.7 KG/M2 | HEIGHT: 63 IN | HEIGHT: 63 IN | HEART RATE: 73 BPM | RESPIRATION RATE: 20 BRPM | DIASTOLIC BLOOD PRESSURE: 65 MMHG | WEIGHT: 145.06 LBS | WEIGHT: 145.06 LBS | TEMPERATURE: 98 F | OXYGEN SATURATION: 97 % | SYSTOLIC BLOOD PRESSURE: 134 MMHG

## 2024-01-10 VITALS — BODY MASS INDEX: 25.69 KG/M2 | HEIGHT: 63 IN | WEIGHT: 145 LBS | HEART RATE: 68 BPM

## 2024-01-10 DIAGNOSIS — K21.9 GASTROESOPHAGEAL REFLUX DISEASE, UNSPECIFIED WHETHER ESOPHAGITIS PRESENT: ICD-10-CM

## 2024-01-10 DIAGNOSIS — J96.11 CHRONIC RESPIRATORY FAILURE WITH HYPOXIA: ICD-10-CM

## 2024-01-10 DIAGNOSIS — J84.10 PULMONARY EMPHYSEMA WITH FIBROSIS OF LUNG: ICD-10-CM

## 2024-01-10 DIAGNOSIS — J43.9 PULMONARY EMPHYSEMA WITH FIBROSIS OF LUNG: ICD-10-CM

## 2024-01-10 DIAGNOSIS — Z76.82 AWAITING TRANSPLANTATION OF LUNG: ICD-10-CM

## 2024-01-10 DIAGNOSIS — J43.9 COMBINED PULMONARY FIBROSIS AND EMPHYSEMA (CPFE): Primary | ICD-10-CM

## 2024-01-10 DIAGNOSIS — J84.10 COMBINED PULMONARY FIBROSIS AND EMPHYSEMA (CPFE): Primary | ICD-10-CM

## 2024-01-10 LAB
ALLENS TEST: ABNORMAL
ASCENDING AORTA: 3.12 CM
AV INDEX (PROSTH): 0.94
AV MEAN GRADIENT: 3 MMHG
AV PEAK GRADIENT: 5 MMHG
AV VALVE AREA BY VELOCITY RATIO: 3.22 CM²
AV VALVE AREA: 3.4 CM²
AV VELOCITY RATIO: 0.89
BSA FOR ECHO PROCEDURE: 1.71 M2
CV ECHO LV RWT: 0.4 CM
DELSYS: ABNORMAL
DOP CALC AO PEAK VEL: 1.15 M/S
DOP CALC AO VTI: 24.11 CM
DOP CALC LVOT AREA: 3.6 CM2
DOP CALC LVOT DIAMETER: 2.15 CM
DOP CALC LVOT PEAK VEL: 1.02 M/S
DOP CALC LVOT STROKE VOLUME: 81.86 CM3
DOP CALCLVOT PEAK VEL VTI: 22.56 CM
E WAVE DECELERATION TIME: 295.64 MSEC
E/A RATIO: 0.58
E/E' RATIO: 6.57 M/S
ECHO LV POSTERIOR WALL: 0.94 CM (ref 0.6–1.1)
EJECTION FRACTION: 60 %
FEF 25 75 LLN: 1.09
FEF 25 75 PRE REF: 18 %
FEF 25 75 REF: 2.16
FEV05 LLN: 0.92
FEV05 REF: 1.78
FEV1 FVC LLN: 67
FEV1 FVC PRE REF: 63.1 %
FEV1 FVC REF: 79
FEV1 LLN: 1.79
FEV1 PRE REF: 42 %
FEV1 REF: 2.38
FIO2: 0.38
FLOW: 6
FRACTIONAL SHORTENING: 35 % (ref 28–44)
FVC LLN: 2.27
FVC PRE REF: 66.1 %
FVC REF: 3.01
HCO3 UR-SCNC: 26.1 MMOL/L (ref 24–28)
INTERVENTRICULAR SEPTUM: 0.81 CM (ref 0.6–1.1)
IVRT: 99.9 MSEC
LA MAJOR: 4.42 CM
LA MINOR: 4.42 CM
LA WIDTH: 3.41 CM
LEFT ATRIUM SIZE: 3.13 CM
LEFT ATRIUM VOLUME INDEX MOD: 21.2 ML/M2
LEFT ATRIUM VOLUME INDEX: 23.7 ML/M2
LEFT ATRIUM VOLUME MOD: 35.77 CM3
LEFT ATRIUM VOLUME: 40.1 CM3
LEFT INTERNAL DIMENSION IN SYSTOLE: 3.05 CM (ref 2.1–4)
LEFT VENTRICLE DIASTOLIC VOLUME INDEX: 60.3 ML/M2
LEFT VENTRICLE DIASTOLIC VOLUME: 101.9 ML
LEFT VENTRICLE MASS INDEX: 81 G/M2
LEFT VENTRICLE SYSTOLIC VOLUME INDEX: 21.5 ML/M2
LEFT VENTRICLE SYSTOLIC VOLUME: 36.3 ML
LEFT VENTRICULAR INTERNAL DIMENSION IN DIASTOLE: 4.69 CM (ref 3.5–6)
LEFT VENTRICULAR MASS: 136.99 G
LV LATERAL E/E' RATIO: 6.57 M/S
LV SEPTAL E/E' RATIO: 6.57 M/S
MODE: ABNORMAL
MV A" WAVE DURATION": 16.56 MSEC
MV PEAK A VEL: 0.79 M/S
MV PEAK E VEL: 0.46 M/S
MV STENOSIS PRESSURE HALF TIME: 85.74 MS
MV VALVE AREA P 1/2 METHOD: 2.57 CM2
PCO2 BLDA: 41 MMHG (ref 35–45)
PEF LLN: 4.43
PEF PRE REF: 55.9 %
PEF REF: 6.09
PH SMN: 7.41 [PH] (ref 7.35–7.45)
PHYSICIAN COMMENT: ABNORMAL
PISA TR MAX VEL: 1.9 M/S
PO2 BLDA: 173 MMHG (ref 80–100)
POC BE: 2 MMOL/L
POC SATURATED O2: 100 % (ref 95–100)
POC TCO2: 27 MMOL/L (ref 23–27)
PRE FEF 25 75: 0.39 L/S (ref 1.09–3.61)
PRE FET 100: 7.01 SEC
PRE FEV05 REF: 40.2 %
PRE FEV1 FVC: 50.06 % (ref 67.1–89.73)
PRE FEV1: 1 L (ref 1.79–2.94)
PRE FEV5: 0.72 L (ref 0.92–2.64)
PRE FVC: 1.99 L (ref 2.27–3.79)
PRE PEF: 3.4 L/S (ref 4.43–7.74)
PULM VEIN S/D RATIO: 1.55
PV PEAK D VEL: 0.31 M/S
PV PEAK S VEL: 0.48 M/S
RA MAJOR: 4.04 CM
RA PRESSURE ESTIMATED: 3 MMHG
RA WIDTH: 3.23 CM
RIGHT VENTRICULAR END-DIASTOLIC DIMENSION: 3.08 CM
RV TB RVSP: 5 MMHG
SAMPLE: ABNORMAL
SINUS: 3.42 CM
SITE: ABNORMAL
STJ: 2.84 CM
TDI LATERAL: 0.07 M/S
TDI SEPTAL: 0.07 M/S
TDI: 0.07 M/S
TR MAX PG: 14 MMHG
TRICUSPID ANNULAR PLANE SYSTOLIC EXCURSION: 2.36 CM
TV REST PULMONARY ARTERY PRESSURE: 17 MMHG
Z-SCORE OF LEFT VENTRICULAR DIMENSION IN END DIASTOLE: -0.04
Z-SCORE OF LEFT VENTRICULAR DIMENSION IN END SYSTOLE: 0.36

## 2024-01-10 PROCEDURE — 93306 TTE W/DOPPLER COMPLETE: CPT | Mod: 26,TXP,, | Performed by: INTERNAL MEDICINE

## 2024-01-10 PROCEDURE — 94010 BREATHING CAPACITY TEST: CPT | Mod: S$GLB,TXP,, | Performed by: INTERNAL MEDICINE

## 2024-01-10 PROCEDURE — 36600 WITHDRAWAL OF ARTERIAL BLOOD: CPT | Mod: S$GLB,TXP,, | Performed by: INTERNAL MEDICINE

## 2024-01-10 PROCEDURE — 3075F SYST BP GE 130 - 139MM HG: CPT | Mod: CPTII,S$GLB,TXP, | Performed by: INTERNAL MEDICINE

## 2024-01-10 PROCEDURE — 93306 TTE W/DOPPLER COMPLETE: CPT | Mod: TXP

## 2024-01-10 PROCEDURE — 99999 PR PBB SHADOW E&M-EST. PATIENT-LVL V: CPT | Mod: PBBFAC,TXP,, | Performed by: INTERNAL MEDICINE

## 2024-01-10 PROCEDURE — 3044F HG A1C LEVEL LT 7.0%: CPT | Mod: CPTII,S$GLB,TXP, | Performed by: INTERNAL MEDICINE

## 2024-01-10 PROCEDURE — 82803 BLOOD GASES ANY COMBINATION: CPT | Mod: S$GLB,TXP,, | Performed by: INTERNAL MEDICINE

## 2024-01-10 PROCEDURE — 3008F BODY MASS INDEX DOCD: CPT | Mod: CPTII,S$GLB,TXP, | Performed by: INTERNAL MEDICINE

## 2024-01-10 PROCEDURE — 71046 X-RAY EXAM CHEST 2 VIEWS: CPT | Mod: TC,TXP

## 2024-01-10 PROCEDURE — 71046 X-RAY EXAM CHEST 2 VIEWS: CPT | Mod: 26,TXP,, | Performed by: RADIOLOGY

## 2024-01-10 PROCEDURE — 94618 PULMONARY STRESS TESTING: CPT | Mod: S$GLB,TXP,, | Performed by: INTERNAL MEDICINE

## 2024-01-10 PROCEDURE — 3078F DIAST BP <80 MM HG: CPT | Mod: CPTII,S$GLB,TXP, | Performed by: INTERNAL MEDICINE

## 2024-01-10 PROCEDURE — 1160F RVW MEDS BY RX/DR IN RCRD: CPT | Mod: CPTII,S$GLB,TXP, | Performed by: INTERNAL MEDICINE

## 2024-01-10 PROCEDURE — 99214 OFFICE O/P EST MOD 30 MIN: CPT | Mod: 25,S$GLB,TXP, | Performed by: INTERNAL MEDICINE

## 2024-01-10 PROCEDURE — 1159F MED LIST DOCD IN RCRD: CPT | Mod: CPTII,S$GLB,TXP, | Performed by: INTERNAL MEDICINE

## 2024-01-10 RX ORDER — FLUTICASONE PROPIONATE 50 MCG
1 SPRAY, SUSPENSION (ML) NASAL DAILY PRN
COMMUNITY
Start: 2023-12-06

## 2024-01-10 RX ORDER — PSEUDOEPHEDRINE HCL 30 MG/1
30 TABLET, FILM COATED ORAL EVERY 6 HOURS PRN
COMMUNITY
Start: 2024-01-02

## 2024-01-10 NOTE — PROGRESS NOTES
LUNG TRANSPLANT PRE FOLLOW-UP    Referring Physician: Denis Rai    Reason for Visit:  Pre-lung transplant follow-up.         Date of Initial Evaluation:                                                                                              History of Present Illness: Antonella Chase is a 63 y.o. female who is on  6-10 L of oxygen. She is on no assisted ventilation.  Her New York Heart Association Class is III and a Karnofsky score of 70% - Cares for self: Unable to carry on normal activity or active work. She is not diabetic.     Requires Supplemental O2: Yes. At rest: Nasal cannula - 6 L/min.,  With sleep: Nasal cannula - 6 L/min., and  With exercise: Nasal cannula - 8 L/min.    Massive Hemoptysis: 0 occurrences  (Enter the number of times in the last year)    Exacerbations: 3 occurrences  (Enter the number of times in the last year)    Microbiology Infections: No    Patient returns for follow up visit.  She is currently listed for lung transplant. Dyspnea stable. She does continue to remain active and is enrolled in pulmonary rehab.  She takes all inhalers and OFEV as directed.  Has GERD and esophageal spasms which she states is under control when she takes her Carafate as directed. No recent exacerbations/hospitalizations. She has evaluation for transplant candidacy in [place] at the end of January.      Review of Systems   Constitutional:  Negative for chills, diaphoresis, fever, malaise/fatigue and weight loss.   HENT:  Negative for congestion, nosebleeds and sinus pain.    Eyes:  Negative for blurred vision.   Respiratory:  Positive for cough (chronic, dry) and shortness of breath (with exertion, worse than previous). Negative for hemoptysis, sputum production and wheezing.    Cardiovascular:  Negative for chest pain, palpitations, orthopnea, claudication and leg swelling.   Gastrointestinal:  Negative for abdominal pain, constipation, diarrhea, heartburn, nausea and vomiting.   Genitourinary:   "Negative for dysuria, frequency and urgency.   Musculoskeletal:  Negative for myalgias.   Skin:  Negative for itching and rash.   Neurological:  Negative for dizziness, tremors and headaches.   Psychiatric/Behavioral:  Negative for substance abuse. The patient is not nervous/anxious.      Objective:   /65 (BP Location: Left arm, Patient Position: Sitting, BP Method: Small (Automatic))   Pulse 73   Temp 98 °F (36.7 °C) (Oral)   Resp 20   Ht 5' 3" (1.6 m)   Wt 65.8 kg (145 lb 1 oz)   SpO2 97% Comment: 6 liters  BMI 25.70 kg/m²   Physical Exam  Constitutional:       Appearance: She is well-developed.      Interventions: Nasal cannula in place.   HENT:      Head: Normocephalic and atraumatic.   Eyes:      Conjunctiva/sclera: Conjunctivae normal.   Cardiovascular:      Rate and Rhythm: Normal rate and regular rhythm.      Heart sounds: Normal heart sounds.   Pulmonary:      Effort: Pulmonary effort is normal.      Breath sounds: Decreased air movement present. Examination of the right-middle field reveals decreased breath sounds. Examination of the left-middle field reveals decreased breath sounds. Examination of the right-lower field reveals decreased breath sounds. Examination of the left-lower field reveals decreased breath sounds. Decreased breath sounds and rales (trace) present.   Abdominal:      General: Bowel sounds are normal.      Palpations: Abdomen is soft.   Musculoskeletal:         General: Normal range of motion.      Cervical back: Normal range of motion and neck supple.   Skin:     General: Skin is warm and dry.   Neurological:      Mental Status: She is alert and oriented to person, place, and time.       Labs:  Hospital Outpatient Visit on 01/10/2024   Component Date Value    Pre FVC 01/10/2024 1.99 (L)     PRE FEV5 01/10/2024 0.72 (L)     Pre FEV1 01/10/2024 1.00 (L)     Pre FEV1 FVC 01/10/2024 50.06 (L)     Pre FEF 25 75 01/10/2024 0.39 (L)     Pre PEF 01/10/2024 3.40 (L)     Pre  " "01/10/2024 7.01     FVC Ref 01/10/2024 3.01     FVC LLN 01/10/2024 2.27     FVC Pre Ref 01/10/2024 66.1     FEV05 REF 01/10/2024 1.78     FEV05 LLN 01/10/2024 0.92     PRE FEV05 REF 01/10/2024 40.2     FEV1 Ref 01/10/2024 2.38     FEV1 LLN 01/10/2024 1.79     FEV1 Pre Ref 01/10/2024 42.0     FEV1 FVC Ref 01/10/2024 79     FEV1 FVC LLN 01/10/2024 67     FEV1 FVC Pre Ref 01/10/2024 63.1     FEF 25 75 Ref 01/10/2024 2.16     FEF 25 75 LLN 01/10/2024 1.09     FEF 25 75 Pre Ref 01/10/2024 18.0     PEF Ref 01/10/2024 6.09     PEF LLN 01/10/2024 4.43     PEF Pre Ref 01/10/2024 55.9    Hospital Outpatient Visit on 01/10/2024   Component Date Value    POC PH 01/10/2024 7.412     POC PCO2 01/10/2024 41.0     POC PO2 01/10/2024 173 (H)     POC HCO3 01/10/2024 26.1     POC BE 01/10/2024 2     POC SATURATED O2 01/10/2024 100     POC TCO2 01/10/2024 27     Sample 01/10/2024 ARTERIAL     Site 01/10/2024 RR     Allens Test 01/10/2024 Pass     DelSys 01/10/2024 Nasal Can     Mode 01/10/2024 SPONT     Flow 01/10/2024 6     FiO2 01/10/2024 0.38    Hospital Outpatient Visit on 01/10/2024   Component Date Value    RA Width 01/10/2024 3.23     LA volume (mod) 01/10/2024 35.77     Left Atrium Major Axis 01/10/2024 4.42     Left Atrium Minor Axis 01/10/2024 4.42     RA Major Axis 01/10/2024 4.04     LV Diastolic Volume 01/10/2024 101.90     LV Systolic Volume 01/10/2024 36.30     PV Peak D Per 01/10/2024 0.31     PV Peak S Per 01/10/2024 0.48     MV Peak A Per 01/10/2024 0.79     MV stenosis pressure 1/2* 01/10/2024 85.74     TR Max Per 01/10/2024 1.90     MV Peak E Per 01/10/2024 0.46     Ao VTI 01/10/2024 24.11     Ao peak per 01/10/2024 1.15     LVOT peak VTI 01/10/2024 22.56     LVOT peak per 01/10/2024 1.02     LVOT diameter 01/10/2024 2.15     MV "A" wave duration 01/10/2024 16.56     IVRT 01/10/2024 99.90     E wave deceleration time 01/10/2024 295.64     AV mean gradient 01/10/2024 3     TAPSE 01/10/2024 2.36     RVDD 01/10/2024 " 3.08     LA size 01/10/2024 3.13     Ascending aorta 01/10/2024 3.12     STJ 01/10/2024 2.84     Sinus 01/10/2024 3.42     LVIDs 01/10/2024 3.05     Posterior Wall 01/10/2024 0.94     IVS 01/10/2024 0.81     LVIDd 01/10/2024 4.69     TDI LATERAL 01/10/2024 0.07     LA WIDTH 01/10/2024 3.41     TDI SEPTAL 01/10/2024 0.07     LV LATERAL E/E' RATIO 01/10/2024 6.57     LV SEPTAL E/E' RATIO 01/10/2024 6.57     FS 01/10/2024 35     LA volume 01/10/2024 40.10     LV mass 01/10/2024 136.99     Left Ventricle Relative * 01/10/2024 0.40     AV valve area 01/10/2024 3.40     AV Velocity Ratio 01/10/2024 0.89     AV index (prosthetic) 01/10/2024 0.94     MV valve area p 1/2 meth* 01/10/2024 2.57     E/A ratio 01/10/2024 0.58     Mean e' 01/10/2024 0.07     Pulm vein S/D ratio 01/10/2024 1.55     LVOT area 01/10/2024 3.6     LVOT stroke volume 01/10/2024 81.86     AV peak gradient 01/10/2024 5     E/E' ratio 01/10/2024 6.57     Triscuspid Valve Regurgi* 01/10/2024 14     ROSE by Velocity Ratio 01/10/2024 3.22     BSA 01/10/2024 1.71     LV Systolic Volume Index 01/10/2024 21.5     LV Diastolic Volume Index 01/10/2024 60.30     LV Mass Index 01/10/2024 81     LA Volume Index 01/10/2024 23.7     LA Volume Index (Mod) 01/10/2024 21.2     ZLVIDS 01/10/2024 0.36     ZLVIDD 01/10/2024 -0.04     TV resting pulmonary art* 01/10/2024 17     RV TB RVSP 01/10/2024 5     Est. RA pres 01/10/2024 3     EF 01/10/2024 60    Lab Visit on 01/10/2024   Component Date Value    WBC 01/10/2024 8.08     RBC 01/10/2024 4.35     Hemoglobin 01/10/2024 14.1     Hematocrit 01/10/2024 42.5     MCV 01/10/2024 98     MCH 01/10/2024 32.4 (H)     MCHC 01/10/2024 33.2     RDW 01/10/2024 12.6     Platelets 01/10/2024 422     MPV 01/10/2024 10.4     Immature Granulocytes 01/10/2024 0.6 (H)     Gran # (ANC) 01/10/2024 4.8     Immature Grans (Abs) 01/10/2024 0.05 (H)     Lymph # 01/10/2024 2.3     Mono # 01/10/2024 0.7     Eos # 01/10/2024 0.2     Baso # 01/10/2024  0.07     nRBC 01/10/2024 0     Gran % 01/10/2024 58.9     Lymph % 01/10/2024 29.0     Mono % 01/10/2024 8.7     Eosinophil % 01/10/2024 1.9     Basophil % 01/10/2024 0.9     Differential Method 01/10/2024 Automated     Sodium 01/10/2024 136     Potassium 01/10/2024 4.1     Chloride 01/10/2024 98     CO2 01/10/2024 29     Glucose 01/10/2024 167 (H)     BUN 01/10/2024 8     Creatinine 01/10/2024 0.9     Calcium 01/10/2024 9.5     Total Protein 01/10/2024 7.2     Albumin 01/10/2024 3.8     Total Bilirubin 01/10/2024 0.3     Alkaline Phosphatase 01/10/2024 39 (L)     AST 01/10/2024 38     ALT 01/10/2024 60 (H)     eGFR 01/10/2024 >60.0     Anion Gap 01/10/2024 9     Hemoglobin A1C 01/10/2024 6.2 (H)     Estimated Avg Glucose 01/10/2024 131            10/26/2023     2:28 PM 8/29/2023     9:37 AM 5/15/2023    11:28 AM 3/30/2023    12:24 PM 11/18/2021    10:00 AM 10/20/2020     9:00 AM 5/4/2020     9:16 AM   Pulmonary Function Tests   FVC 2.05 liters 2.03 liters 2.07 liters 2.26 liters 2.56 liters 2.31 liters 2.5 liters   FEV1 1.1 liters 1.06 liters 1.19 liters 1.26 liters 1.39 liters 1.17 liters 1.32 liters   TLC (liters)   5.16 liters 5.31 liters 4.52 liters 4.36 liters 5.41 liters   DLCO (ml/mmHg sec)   5.54 ml/mmHg sec 6.36 ml/mmHg sec 8.9 ml/mmHg sec 11.7 ml/mmHg sec 9.8 ml/mmHg sec   FVC% 67.9 67 68.4 74.4 83 74 76   FEV1% 46.3 44 49.6 52.6 57 47 51   FEF 25-75 0.49 0.42 0.57 0.59 0.61     FEF 25-75% 22.7 19 25.9 27.1 27     TLC%   108.2 111.3 95 91 106   RV   3.04 3.05 1.74 2.03 2.85   RV%   163.8 164.4 95 111 147   DLCO%   25 28.7 40 52 40         1/10/2024     9:04 AM 10/26/2023     1:32 PM 8/29/2023     7:59 AM 5/15/2023     9:02 AM 3/30/2023    12:02 PM 12/29/2022    10:56 AM 11/18/2021     9:10 AM   6MW   6MWT Status completed without stopping completed without stopping completed without stopping completed without stopping completed without stopping completed without stopping completed without stopping    Patient Reported Dyspnea Dyspnea;Chest pain;Leg pain Dyspnea Chest pain;Dizziness;Dyspnea;Lightheadedness;Leg pain Dyspnea Chest pain;Dyspnea;Leg pain;Lightheadedness Chest pain;Dyspnea   Was O2 used? Yes Yes Yes Yes Yes Yes No   Delivery Method Cannula;Pull Tank;Continuous Flow Cannula Cannula Cannula;Pull Tank;Continuous Flow Cannula;Pull Tank;Continuous Flow Cannula;Pull Tank;Continuous Flow    6MW Distance walked (feet) 1277 feet 1250 feet 1380 feet 1300 feet 1200 feet 1300 feet 1500 feet   Distance walked (meters) 389.23 meters 381 meters 420.62 meters 396.24 meters 365.76 meters 396.24 meters 457.2 meters   Did patient stop? No No No No No No No   Oxygen Saturation 99 % 98 % 99 % 96 % 98 % 97 % 96 %   Supplemental Oxygen 6 L/M 6 L/M 6 L/M 2 L/M 2 L/M 2 L/M Room Air   Heart Rate 66 bpm 84 bpm 60 bpm 81 bpm 94 bpm 80 bpm 70 bpm   Blood Pressure 140/66 142/72 159/74 136/65 143/61 160/67 111/56   Stephanie Dyspnea Rating  somewhat heavy very light very, very light (just noticeable) very light nothing at all nothing at all light   Oxygen Saturation 93 % 89 % 89 % 92 % 90 % 91 % 88 %   Supplemental Oxygen 6 L/M  6 L/M 6 L/M 4 L/M 2 L/M Room Air   Heart Rate 89 bpm 93 bpm 92 bpm 95 bpm 100 bpm 81 bpm 91 bpm   Blood Pressure 139/64 139/66 176/77 153/67 152/73 135/61 140/65   Stephanie Dyspnea Rating  very heavy somewhat heavy somewhat heavy heavy somewhat heavy heavy heavy   Recovery Time (seconds) 126 seconds 85 seconds 63 seconds 55 seconds 66 seconds 128 seconds 162 seconds   Oxygen Saturation 98 % 99 % 97 % 95 % 98 % 98 % 97 %   Supplemental Oxygen 6 L/M  6 L/M 6 L/M 4 L/M 2 L/M Room Air   Heart Rate 72 bpm 89 bpm 71 bpm  89 bpm 84 bpm 73 bpm       Cardiodiagnostics:  Results for orders placed during the hospital encounter of 05/15/23    Echo Saline Bubble? No    Interpretation Summary  · The left ventricle is normal in size with normal systolic function.  · The estimated ejection fraction is 65%.  · Normal left  ventricular diastolic function.  · Normal right ventricular size with normal right ventricular systolic function.  · Normal central venous pressure (3 mmHg).      Assessment:-  1. Combined pulmonary fibrosis and emphysema (CPFE)    2. Chronic respiratory failure with hypoxia    3. Gastroesophageal reflux disease, unspecified whether esophagitis present    4. Awaiting transplantation of lung      Plan:     Patient followed for combined COPD/pulmonary fibrosis.  On Breztri, prn Albuterol, and OFEV.  Tolerating therapy.  No recent exacerbations.  Spirometry stable.      Continue with O2 as prescribed.  Needs 10L with exertion.  RHC reviewed and she does not have significant PH.  She remains on [id number] mobile NIV is tolerating it well.     Continue with PPI and Carafate as prescribed.     She continues to meet indications for LUT and will remain on the lung transplant list. Continue pulmonary rehab. Agree with evaluation in [place] given high PRA and esophageal dysmotility.       RTC in 2 months or sooner if needed.       Asia Quinn PA-C  Lung Transplant

## 2024-01-10 NOTE — PROCEDURES
Antonella Chase is a 63 y.o.  female patient, who presents for a 6 minute walk test ordered by DO Conchita.  The diagnosis is Pre Lung Transplant Evaluation, Pulmonary Fibrosis.  The patient's BMI is 25.7 kg/m2.  Predicted distance (lower limit of normal) is 362 meters.      Test Results:    The test was completed without stopping. The total time walked was 360 seconds. During walking, the patient reported:  Dyspnea. The patient used supplemental oxygen during testing.     01/10/2024---------Distance: 389.23 meters (1277 feet)     Lap Walk Time O2 Sat % Supplemental Oxygen Heart Rate Blood Pressure Stephanie Scale   Pre-exercise  (Resting) 0 0 99 % 6 L/M 66 bpm 140/66 mmHg 4   During Exercise 1 50 sec 98 % 6 L/M 86 bpm     During Exercise 2 100 sec 92 % 6 L/M 93 bpm     During Exercise 3 160 sec 90 % 6 L/M 91 bpm     During Exercise 4 220 sec 89 % 6 L/M 98 bpm     During Exercise 5 284 sec 89 % 6 L/M 99 bpm     During Exercise 6 345 sec 94 % 6 L/M 84 bpm     End of Exercise  360 sec 93 % 6 L/M 89 bpm 139/64 mmHg 7-8   Post-exercise  (Recovery)   98 % 6 L/M  72 bpm       Recovery Time: 126 seconds    Performing nurse/tech: Mariajose BERNAL      PREVIOUS STUDY:   10/26/2023---------Distance: 381 meters (1250 feet)       Lap Walk Time O2 Sat % Supplemental Oxygen Heart Rate Blood Pressure Stephanie Scale Comment   Pre-exercise  (Resting) 0 0 98 % 6 L/M 84 bpm 142/72 mmHg 1     During Exercise 1 58 sec 98 % 6 L/M 88 bpm         During Exercise 2 111 sec 91 % 6 L/M 90 bpm         During Exercise 3 172 sec 87 % 6 L/M 97 bpm     Oxygen increased   During Exercise 4 227 sec 91 % 8 L/M 99 bpm         During Exercise 5 284 sec 91 % 8 L/M 92 bpm         During Exercise 6 340 sec 88 % 8 L/M 93 bpm     Oxygen increased   End of Exercise   360 sec 89 % 10 L/M 93 bpm 139/66 mmHg 4     Post-exercise  (Recovery)     99 % 10 L/M  89 bpm             CLINICAL INTERPRETATION:  Six minute walk distance is 389.23 meters (1277 feet) with very heavy  dyspnea.  During exercise, there was significant desaturation while breathing supplemental oxygen.  Blood pressure remained stable and Heart rate increased significantly with walking.  The patient did not report non-pulmonary symptoms during exercise.  Since the previous study in October 2023, exercise capacity is unchanged.  Based upon age and body mass index, exercise capacity is normal.

## 2024-01-10 NOTE — LETTER
January 10, 2024        Denis Rai  [address] [facility] [place] [address]  Phone: [phone]  Fax: [phone]             Sunil Kam - Transplant [facility] [[email or web address]] Fl  [address]  [facility] [address]  Phone: [phone]   Patient: Antonella Chase   MR Number: [id number]   YOB: 1960   Date of Visit: 1/10/2024       Dear Dr. Denis Rai    Thank you for referring Antonella Chase to me for evaluation. Attached you will find relevant portions of my assessment and plan of care.    If you have questions, please do not hesitate to call me. I look forward to following Antonella Chase along with you.    Sincerely,    Syeda Arango, DO    Enclosure    If you would like to receive this communication electronically, please contact [email or web address] or [phone] to request [email or web address] Link access.    [email or web address] Link is a tool which provides read-only access to select patient information with whom you have a relationship. Its easy to use and provides real time access to review your patients record including encounter summaries, notes, results, and demographic information.    If you feel you have received this communication in error or would no longer like to receive these types of communications, please e-mail [email or web address]

## 2024-01-17 ENCOUNTER — TELEPHONE (OUTPATIENT)
Dept: TRANSPLANT | Facility: CLINIC | Age: 64
End: 2024-01-17
Payer: MEDICARE

## 2024-01-17 DIAGNOSIS — J84.10 PULMONARY EMPHYSEMA WITH FIBROSIS OF LUNG: Primary | ICD-10-CM

## 2024-01-17 DIAGNOSIS — Z76.82 AWAITING TRANSPLANTATION OF LUNG: ICD-10-CM

## 2024-01-17 DIAGNOSIS — J43.9 PULMONARY EMPHYSEMA WITH FIBROSIS OF LUNG: Primary | ICD-10-CM

## 2024-01-17 NOTE — TELEPHONE ENCOUNTER
Data due to . [facility] updated with data from recent clinic visit. cPRA results still pending. JAGJIT range is 24.5832 - 34.5832.

## 2024-01-29 ENCOUNTER — TELEPHONE (OUTPATIENT)
Dept: TRANSPLANT | Facility: CLINIC | Age: 64
End: 2024-01-29
Payer: MEDICARE

## 2024-01-29 NOTE — TELEPHONE ENCOUNTER
----- Message from Syeda Arango DO sent at 1/29/2024  8:25 AM CST -----    [facility] saw her.  They believe she is a great candidate and will have desensitization protocols in place for her.  Will need to follow up with her to see when she's formally listed with them.  Thanks    Contacted patient. She stated that she received a call today stating that she was accepted as a candidate at [facility]. She stated that she will be listed once financial clearance is obtained and she relocates to [place]. She states that she plans to relocate to [place] on Monday. Instructed patient to keep us updated on her listing status at [facility]. She verbalized her understanding and stated that she will keep us updated.

## 2024-02-06 ENCOUNTER — PATIENT MESSAGE (OUTPATIENT)
Dept: TRANSPLANT | Facility: CLINIC | Age: 64
End: 2024-02-06
Payer: MEDICARE

## 2024-02-08 NOTE — TELEPHONE ENCOUNTER
----- Message from Syeda Arango DO sent at 5/29/2023 11:55 AM CDT -----  Regarding: RE: GI consult / testing  Esophagram please.  Thanks    ----- Message -----  From: Rebecca Hernandez RN  Sent: 5/29/2023  11:39 AM CDT  To: Syeda Arango,   Subject: GI consult / testing                             I have entered orders for patient's evaluation testing except for GI consult / testing.  Since Dr. Michaels is leaving, what GI testing / consult should be ordered?    Rebecca       Statement Selected

## 2024-02-15 ENCOUNTER — TELEPHONE (OUTPATIENT)
Dept: TRANSPLANT | Facility: CLINIC | Age: 64
End: 2024-02-15
Payer: MEDICARE

## 2024-02-15 NOTE — TELEPHONE ENCOUNTER
"Contacted patient regarding a status update. Patient stated that she has relocated to her sister's house outside of [place]. She stated that she was listed on 2/9/24. Inquired if she wanted to be removed from our list. Patient stated, "Dr. Rogers told me no. He told me twice that I should remain listed at both." She inquired if she needed to come for her upcoming appointments. Informed her that Dr. Arango is out of town this week, but I will discuss with the team at our next selection committee meeting on Tuesday, 2/20/24. She verbalized her understanding.  "

## 2024-02-16 ENCOUNTER — PATIENT MESSAGE (OUTPATIENT)
Dept: TRANSPLANT | Facility: CLINIC | Age: 64
End: 2024-02-16
Payer: MEDICARE

## 2024-02-18 ENCOUNTER — TELEPHONE (OUTPATIENT)
Dept: TRANSPLANT | Facility: CLINIC | Age: 64
End: 2024-02-18
Payer: MEDICARE

## 2024-02-18 NOTE — LETTER
February 19, 2024    Antonella Salvatore  [address] [facility] [facility] [place] [address]        Dear Antonella Chase:  MRN: [id number]    On 2/18/24, we received notification that you were transplanted at another transplant center. We wish you well with your new Lungs and hope it works for you for many years to come.  As you have now been transplanted, your name has been removed from the [facility] lung transplant waiting list as of 02/18/24.     Should you need transplant services in the future, please consider [facility] for those services. If you have any questions or concerns, please don't hesitate to call.                                                                                Sincerely,       Syeda Arango D.O.  Medical Director, Lung Transplant  Pulmonary & Critical Care Medicine    [facility]  [address] [place] [id number]  [phone]    Cc: Leela Garcia MD    Encl: UNOS Letter               The Organ Proc[employer]ment and Transplantation Network   Toll-free patient services line: 1-568.517.1660  Your resource for organ transplant information      Staffed 8:30 am - 5:00 pm ET Monday - Friday   Leave a message 24/7 to receive a call back    The Organ Procurement and Transplantation Network (OPTN) is the national transplant system. It makes the policies that decide how donated organs are matched to patients waiting for a transplant. The OPTN:    Makes sure donated organs get matched to people on the transplant waiting list  Tells people about the donation and transplant processes  Makes sure that the public knows about the need for more organ and tissue donations    The OPTN has a free patient services line that you can call to:  Get more information about:   o Organ donation and organ transplants   o Donation and transplant policies  Get an information kit with:   o A list of transplant hospitals   o Waiting list information  Talk about any questions you may have  about your transplant hospital or organ procurement organization. The staff will do their best to help you or point you to others who may help.  Find out how you can volunteer with the OPTN and help shape transplant policy    The patient services line number is: 8-756-594-9306    Patient services line staff CANNOT answer questions about your own medical care, including:  Waiting list status  Test results  Medical records  You will need to call your transplant hospital for this information.    The following websites have more information about transplantation and donation:  OPTN: https://optn.transplant.hrsa.gov/  For potential living donors and transplant recipients:   o Living with transplant: https://www.transplantliving.org/   o Living donation process: https://optn.transplant.hrsa.gov/living-donation/     o Financial assistance: https://www.livingdonorassistance.org/  Transplantation data: https://www.srtr.org/  Organ donation: https://www.organdonor.gov/    Volunteer with the OPTN: https://optn.transplant.hrsa.gov/get-involved

## 2024-02-19 NOTE — TELEPHONE ENCOUNTER
Received notification from Dr. Arango that patient received a bilateral lung transplant at [facility] yesterday (2/17/24). Instructions received to remove patient from the list.    Patient removed from the wait list in [facility] since she was transplanted at another center.    2/19/24 - Removal letter sent to patient.

## 2024-02-22 ENCOUNTER — TELEPHONE (OUTPATIENT)
Dept: TRANSPLANT | Facility: CLINIC | Age: 64
End: 2024-02-22
Payer: MEDICARE

## 2024-02-22 NOTE — TELEPHONE ENCOUNTER
----- Message from Danny Cantrell sent at 2/22/2024  1:53 PM CST -----  Regarding: call back  Pt call to speak with Rebecca    Call  [phone]    Attempted to contact patient. No answer. Left a message requesting a return call.

## 2024-07-16 ENCOUNTER — PATIENT MESSAGE (OUTPATIENT)
Dept: TRANSPLANT | Facility: CLINIC | Age: 64
End: 2024-07-16
Payer: MEDICARE

## 2025-02-24 NOTE — LETTER
March 31, 2023        Denis Rai  [address] [facility] [place] [address]  Phone: [phone]  Fax: [phone]             Sunil Kam - Transplant [facility] Fl  [address]  [facility] [address]  Phone: [phone]   Patient: Antonella Chase   MR Number: [id number]   YOB: 1960   Date of Visit: 3/30/2023       Dear Dr. Denis Rai    Thank you for referring Antonella Chase to me for evaluation. Attached you will find relevant portions of my assessment and plan of care.    If you have questions, please do not hesitate to call me. I look forward to following Antonella Chase along with you.    Sincerely,    Syeda Arango, DO    Enclosure    If you would like to receive this communication electronically, please contact [email or web address] or [phone] to request [employer] Link access.    [employer] Link is a tool which provides read-only access to select patient information with whom you have a relationship. Its easy to use and provides real time access to review your patients record including encounter summaries, notes, results, and demographic information.    If you feel you have received this communication in error or would no longer like to receive these types of communications, please e-mail [email or web address]       
(4) walks frequently

## (undated) DEVICE — CATH DXTERITY JR40 100CM 6FR

## (undated) DEVICE — KIT GLIDESHEATH SLEND 6FR 10CM

## (undated) DEVICE — TRANSDUCER ADULT DISP

## (undated) DEVICE — HEMOSTAT VASC BAND REG 24CM

## (undated) DEVICE — SPIKE CONTRAST CONTROLLER

## (undated) DEVICE — STOPCOCK 3-WAY

## (undated) DEVICE — TRAY CATH LAB OMC

## (undated) DEVICE — GUIDEWIRE SUPRA CORE 035 190CM

## (undated) DEVICE — CATH SWAN GANZ STND 7FR

## (undated) DEVICE — KIT CUSTOM MANIFOLD

## (undated) DEVICE — CATH ANG PIGTAIL 4FR INFINITY

## (undated) DEVICE — KIT PROBE COVER WITH GEL

## (undated) DEVICE — PAD DEFIB CADENCE ADULT R2

## (undated) DEVICE — OMNIPAQUE 350MG 150ML VIAL

## (undated) DEVICE — KIT MICROINTRO 4F .018X40X7CM

## (undated) DEVICE — SHEATH INTRODUCER 7FR 11CM

## (undated) DEVICE — DRAPE RADIAL BRACHIAL 29X42IN

## (undated) DEVICE — CATH JACKY RADIAL 5FR 100CM